# Patient Record
Sex: FEMALE | Race: WHITE | NOT HISPANIC OR LATINO | Employment: OTHER | URBAN - METROPOLITAN AREA
[De-identification: names, ages, dates, MRNs, and addresses within clinical notes are randomized per-mention and may not be internally consistent; named-entity substitution may affect disease eponyms.]

---

## 2017-01-17 ENCOUNTER — OFFICE VISIT (OUTPATIENT)
Dept: FAMILY MEDICINE CLINIC | Facility: CLINIC | Age: 57
End: 2017-01-17

## 2017-01-17 VITALS
HEART RATE: 98 BPM | DIASTOLIC BLOOD PRESSURE: 80 MMHG | BODY MASS INDEX: 22.66 KG/M2 | HEIGHT: 66 IN | SYSTOLIC BLOOD PRESSURE: 108 MMHG | WEIGHT: 141 LBS | OXYGEN SATURATION: 98 % | TEMPERATURE: 98.6 F

## 2017-01-17 DIAGNOSIS — G90.50 RSD (REFLEX SYMPATHETIC DYSTROPHY): Primary | ICD-10-CM

## 2017-01-17 PROCEDURE — 99213 OFFICE O/P EST LOW 20 MIN: CPT | Performed by: PHYSICIAN ASSISTANT

## 2017-01-17 RX ORDER — GABAPENTIN 800 MG/1
800 TABLET ORAL 3 TIMES DAILY
Qty: 90 TABLET | Refills: 11 | Status: SHIPPED | OUTPATIENT
Start: 2017-01-17 | End: 2018-01-05 | Stop reason: SDUPTHER

## 2017-01-17 RX ORDER — GABAPENTIN 800 MG/1
1 TABLET ORAL 3 TIMES DAILY
Refills: 1 | COMMUNITY
Start: 2016-12-26 | End: 2017-01-17 | Stop reason: SDUPTHER

## 2017-01-17 RX ORDER — VENLAFAXINE HYDROCHLORIDE 150 MG/1
1 CAPSULE, EXTENDED RELEASE ORAL DAILY
Refills: 0 | COMMUNITY
Start: 2016-12-19 | End: 2017-05-04 | Stop reason: ALTCHOICE

## 2017-01-17 RX ORDER — AMITRIPTYLINE HYDROCHLORIDE 10 MG/1
10 TABLET, FILM COATED ORAL 2 TIMES DAILY
Qty: 60 TABLET | Refills: 11 | Status: SHIPPED | OUTPATIENT
Start: 2017-01-17 | End: 2018-01-05 | Stop reason: SDUPTHER

## 2017-01-17 RX ORDER — BUPROPION HYDROCHLORIDE 150 MG/1
1 TABLET, EXTENDED RELEASE ORAL DAILY
Refills: 0 | COMMUNITY
Start: 2017-01-11 | End: 2017-01-24 | Stop reason: SINTOL

## 2017-01-17 NOTE — MR AVS SNAPSHOT
Lisbeth Perdue   1/17/2017 8:00 AM   Office Visit    Dept Phone:  [phone]   Encounter #:  [id number]    Provider:  THANG Rosenthal   Department:  [facility]                Your Full Care Plan              Today's Medication Changes          These changes are accurate as of: 1/17/17  8:24 AM.  If you have any questions, ask your nurse or doctor.               Medication(s)that have changed:     gabapentin 800 MG tablet   Commonly known as:  NEURONTIN   Take 1 tablet by mouth 3 (Three) Times a Day.   What changed:  Another medication with the same name was removed. Continue taking this medication, and follow the directions you see here.   Changed by:  THANG Rosenthal            Where to Get Your Medications      These medications were sent to [facility] [place] [address] - [address] [place] [address] - [phone]  - [phone] [address] [address]     Phone:  [phone]     amitriptyline 10 MG tablet    gabapentin 800 MG tablet                  Your Updated Medication List          This list is accurate as of: 1/17/17  8:24 AM.  Always use your most recent med list.                ALEVE 220 MG tablet   Generic drug:  naproxen sodium       amitriptyline 10 MG tablet   Commonly known as:  ELAVIL   Take 1 tablet by mouth 2 (Two) Times a Day.       ascorbic acid 1000 MG tablet   Commonly known as:  VITAMIN C       buPROPion  MG 12 hr tablet   Commonly known as:  WELLBUTRIN SR       clonazePAM 0.5 MG tablet   Commonly known as:  KlonoPIN       fish oil 1000 MG capsule capsule       gabapentin 800 MG tablet   Commonly known as:  NEURONTIN   Take 1 tablet by mouth 3 (Three) Times a Day.       levothyroxine 25 MCG tablet   Commonly known as:  SYNTHROID, LEVOTHROID       MINIVELLE 0.05 MG/24HR patch   Generic drug:  estradiol       multivitamin tablet tablet       PROBIOTIC DAILY PO       "venlafaxine  MG 24 hr capsule   Commonly known as:  EFFEXOR-XR       Vitamin D 2000 UNITS tablet               You Were Diagnosed With        Codes Comments    RSD (reflex sympathetic dystrophy)    -  Primary ICD-10-CM: G90.50  ICD-9-CM: 337.20       Instructions     None    Patient Instructions History      Upcoming Appointments     Visit Type Date Time Department    OFFICE VISIT 1/17/2017  8:00 AM [facility]    PHYSICAL 1/24/2017  9:00 AM [facility]      MyChart Signup     Our records indicate that your [facility] account has been deactivated. If you would like to reactivate your account, please email [email or web address] or call [phone] to talk to our [employer] staff.             Other Info from Your Visit           Your Appointments     Jan 24, 2017  9:00 AM EST   Physical with Uday Norwood MD   [facility] (--)    [address] [place] [id number] [phone]           Arrive 15 minutes prior to appointment.              Allergies     No Known Allergies      Reason for Visit     Oral Pain refills on klonopin and amitriptyline    Pain refills on gabapentin      Vital Signs     Blood Pressure Pulse Temperature Height Weight Oxygen Saturation    108/80 98 98.6 °F (37 °C) 66\" (167.6 cm) 141 lb (64 kg) 98%    Body Mass Index Smoking Status                22.76 kg/m2 Never Smoker          Problems and Diagnoses Noted     RSD (reflex sympathetic dystrophy)    -  Primary        "

## 2017-01-17 NOTE — PROGRESS NOTES
Subjective   Lisbeth Perdue is a 56 y.o. female    History of Present Illness  Patient 56-year-old white female comes in for follow-up of RSD facial pain, she takes gabapentin, Klonopin and amitriptyline for pain she states her pain is controlled doing well no problems or complaints.  Has pain when opening closing mouth.  But is improved and controlled with medication.      The following portions of the patient's history were reviewed and updated as appropriate: allergies, current medications, past social history and problem list    Review of Systems   Constitutional: Negative for fatigue and unexpected weight change.   Respiratory: Negative for cough, chest tightness and shortness of breath.    Cardiovascular: Negative for chest pain, palpitations and leg swelling.   Gastrointestinal: Negative for nausea.   Skin: Negative for color change and rash.   Neurological: Negative for dizziness, syncope, weakness and headaches.       Objective     Vitals:    01/17/17 0804   BP: 108/80   Pulse: 98   Temp: 98.6 °F (37 °C)   SpO2: 98%       Physical Exam   Constitutional: She appears well-developed and well-nourished.   Neck: No JVD present.   Cardiovascular: Normal rate, regular rhythm, normal heart sounds, intact distal pulses and normal pulses.    No murmur heard.  Pulmonary/Chest: Effort normal and breath sounds normal. No respiratory distress.   Abdominal: Soft. Bowel sounds are normal. There is no hepatosplenomegaly. There is no tenderness.   Musculoskeletal: She exhibits no edema.   Skin: Skin is warm and dry.   Nursing note and vitals reviewed.      Assessment/Plan     Diagnoses and all orders for this visit:    RSD (reflex sympathetic dystrophy)  -     amitriptyline (ELAVIL) 10 MG tablet; Take 1 tablet by mouth 2 (Two) Times a Day.  -     gabapentin (NEURONTIN) 800 MG tablet; Take 1 tablet by mouth 3 (Three) Times a Day.    Other orders  -     venlafaxine XR (EFFEXOR-XR) 150 MG 24 hr capsule; Take 1 capsule by  mouth Daily.  -     buPROPion SR (WELLBUTRIN SR) 150 MG 12 hr tablet; Take 1 tablet by mouth Daily.  -     Discontinue: gabapentin (NEURONTIN) 800 MG tablet; Take 1 tablet by mouth 3 (Three) Times a Day.     #1 amitriptyline 10 mg 1 tablet twice day dispensed 60 with 11 refills    Gabapentin 800 mg 3 times a day dispense 90×11 refills    Klonopin 0.5 mg 1 by mouth everyday dispense 30 with 5 refills

## 2017-01-24 ENCOUNTER — OFFICE VISIT (OUTPATIENT)
Dept: FAMILY MEDICINE CLINIC | Facility: CLINIC | Age: 57
End: 2017-01-24

## 2017-01-24 VITALS
DIASTOLIC BLOOD PRESSURE: 60 MMHG | WEIGHT: 142 LBS | HEART RATE: 80 BPM | HEIGHT: 66 IN | BODY MASS INDEX: 22.82 KG/M2 | SYSTOLIC BLOOD PRESSURE: 108 MMHG | TEMPERATURE: 98.4 F

## 2017-01-24 DIAGNOSIS — N95.1 MENOPAUSAL STATE: ICD-10-CM

## 2017-01-24 DIAGNOSIS — R94.31 NONSPECIFIC ABNORMAL ELECTROCARDIOGRAM (ECG) (EKG): ICD-10-CM

## 2017-01-24 DIAGNOSIS — G90.521 COMPLEX REGIONAL PAIN SYNDROME TYPE 1 OF RIGHT LOWER EXTREMITY: ICD-10-CM

## 2017-01-24 DIAGNOSIS — E03.9 ACQUIRED HYPOTHYROIDISM: ICD-10-CM

## 2017-01-24 DIAGNOSIS — Z00.00 ENCOUNTER FOR ANNUAL PHYSICAL EXAM: Primary | ICD-10-CM

## 2017-01-24 LAB
BILIRUB BLD-MCNC: NEGATIVE MG/DL
CLARITY, POC: CLEAR
COLOR UR: YELLOW
GLUCOSE UR STRIP-MCNC: NEGATIVE MG/DL
KETONES UR QL: NEGATIVE
LEUKOCYTE EST, POC: NEGATIVE
NITRITE UR-MCNC: NEGATIVE MG/ML
PH UR: 7 [PH] (ref 5–8)
PROT UR STRIP-MCNC: NEGATIVE MG/DL
RBC # UR STRIP: NEGATIVE /UL
SP GR UR: 1.01 (ref 1–1.03)
UROBILINOGEN UR QL: NORMAL

## 2017-01-24 PROCEDURE — 99396 PREV VISIT EST AGE 40-64: CPT | Performed by: FAMILY MEDICINE

## 2017-01-24 PROCEDURE — 81003 URINALYSIS AUTO W/O SCOPE: CPT | Performed by: FAMILY MEDICINE

## 2017-01-24 NOTE — MR AVS SNAPSHOT
Lisbeth Miller   1/24/2017 9:00 AM   Office Visit    Dept Phone:  [phone]   Encounter #:  [id number]    Provider:  Uday Norwood MD   Department:  [facility]                Your Full Care Plan              Today's Medication Changes          These changes are accurate as of: 1/24/17  9:49 AM.  If you have any questions, ask your nurse or doctor.               Stop taking medication(s)listed here:     buPROPion  MG 12 hr tablet   Commonly known as:  WELLBUTRIN SR   Stopped by:  Uday Norwood MD                      Your Updated Medication List          This list is accurate as of: 1/24/17  9:49 AM.  Always use your most recent med list.                ALEVE 220 MG tablet   Generic drug:  naproxen sodium       amitriptyline 10 MG tablet   Commonly known as:  ELAVIL   Take 1 tablet by mouth 2 (Two) Times a Day.       ascorbic acid 1000 MG tablet   Commonly known as:  VITAMIN C       clonazePAM 0.5 MG tablet   Commonly known as:  KlonoPIN       fish oil 1000 MG capsule capsule       gabapentin 800 MG tablet   Commonly known as:  NEURONTIN   Take 1 tablet by mouth 3 (Three) Times a Day.       levothyroxine 25 MCG tablet   Commonly known as:  SYNTHROID, LEVOTHROID       MINIVELLE 0.05 MG/24HR patch   Generic drug:  estradiol       multivitamin tablet tablet       PROBIOTIC DAILY PO       venlafaxine  MG 24 hr capsule   Commonly known as:  EFFEXOR-XR       Vitamin D 2000 UNITS tablet               We Performed the Following     POC Urinalysis Dipstick, Automated       You Were Diagnosed With        Codes Comments    Encounter for annual physical exam    -  Primary ICD-10-CM: Z00.00  ICD-9-CM: V70.0       Instructions     None    Patient Instructions History      Upcoming Appointments     Visit Type Date Time Department    PHYSICAL 1/24/2017  9:00 AM [facility]    FOLLOW UP 7/11/2017  8:00 AM [facility]       "  MyChart Signup     [facility] allows you to send messages to your doctor, view your test results, renew your prescriptions, schedule appointments, and more. To sign up, go to [employer] and click on the Sign Up Now link in the New User? box. Enter your [employer] Activation Code exactly as it appears below along with the last four digits of your Social Security Number and your Date of Birth () to complete the sign-up process. If you do not sign up before the expiration date, you must request a new code.    [employer] Activation Code: [email or web address]  Expires: 2017  9:49 AM    If you have questions, you can email [email or web address] or call [phone] to talk to our [employer] staff. Remember, [employer] is NOT to be used for urgent needs. For medical emergencies, dial 911.               Other Info from Your Visit           Your Appointments     2017  8:00 AM EDT   Follow Up with THANG Rosenthal   [facility] (--)    [address] [place] [id number] [phone]           Arrive 15 minutes prior to appointment.              Allergies     No Known Allergies      Reason for Visit     Annual Exam Pt is not fasting this morning.  Would like dexa scan,  Hep C questions    Thyroid Problem Pt has thyroiditis, off synthroid now, sees Dr. Hannon she would like this checked just to make sure      Vital Signs     Blood Pressure Pulse Temperature Height Weight Body Mass Index    108/60 80 98.4 °F (36.9 °C) 66\" (167.6 cm) 142 lb (64.4 kg) 22.92 kg/m2    Smoking Status                   Never Smoker           Problems and Diagnoses Noted     Encounter for annual physical exam    -  Primary      Results     POC Urinalysis Dipstick, Automated      Component Value Standard Range & Units    Color Yellow Yellow, Straw, Dark Yellow, Arlette    Clarity, UA Clear Clear    Glucose, UA Negative Negative, 1000 mg/dL (3+) mg/dL    " Bilirubin Negative Negative    Ketones, UA Negative Negative    Specific Gravity  1.010 1.005 - 1.030    Blood, UA Negative Negative    pH, Urine 7.0 5.0 - 8.0    Protein, POC Negative Negative mg/dL    Urobilinogen, UA Normal Normal    Leukocytes Negative Negative    Nitrite, UA Negative Negative

## 2017-01-25 ENCOUNTER — APPOINTMENT (OUTPATIENT)
Dept: LAB | Facility: HOSPITAL | Age: 57
End: 2017-01-25

## 2017-01-25 LAB
25(OH)D3 SERPL-MCNC: 37.6 NG/ML
ALBUMIN SERPL-MCNC: 4.2 G/DL (ref 3.2–4.8)
ALBUMIN/GLOB SERPL: 1.7 G/DL (ref 1.5–2.5)
ALP SERPL-CCNC: 37 U/L (ref 25–100)
ALT SERPL W P-5'-P-CCNC: 23 U/L (ref 7–40)
ANION GAP SERPL CALCULATED.3IONS-SCNC: 3 MMOL/L (ref 3–11)
ARTICHOKE IGE QN: 104 MG/DL (ref 0–130)
AST SERPL-CCNC: 28 U/L (ref 0–33)
BILIRUB SERPL-MCNC: 0.5 MG/DL (ref 0.3–1.2)
BUN BLD-MCNC: 11 MG/DL (ref 9–23)
BUN/CREAT SERPL: 12.2 (ref 7–25)
CALCIUM SPEC-SCNC: 9.9 MG/DL (ref 8.7–10.4)
CHLORIDE SERPL-SCNC: 105 MMOL/L (ref 99–109)
CHOLEST SERPL-MCNC: 254 MG/DL (ref 0–200)
CO2 SERPL-SCNC: 33 MMOL/L (ref 20–31)
CREAT BLD-MCNC: 0.9 MG/DL (ref 0.6–1.3)
DEPRECATED RDW RBC AUTO: 43.2 FL (ref 37–54)
ERYTHROCYTE [DISTWIDTH] IN BLOOD BY AUTOMATED COUNT: 13 % (ref 11.3–14.5)
GFR SERPL CREATININE-BSD FRML MDRD: 65 ML/MIN/1.73
GLOBULIN UR ELPH-MCNC: 2.5 GM/DL
GLUCOSE BLD-MCNC: 84 MG/DL (ref 70–100)
HCT VFR BLD AUTO: 40.4 % (ref 34.5–44)
HCV AB SER DONR QL: NORMAL
HDLC SERPL-MCNC: 90 MG/DL (ref 40–60)
HGB BLD-MCNC: 13.3 G/DL (ref 11.5–15.5)
MCH RBC QN AUTO: 29.8 PG (ref 27–31)
MCHC RBC AUTO-ENTMCNC: 32.9 G/DL (ref 32–36)
MCV RBC AUTO: 90.6 FL (ref 80–99)
PLATELET # BLD AUTO: 257 10*3/MM3 (ref 150–450)
PMV BLD AUTO: 11.6 FL (ref 6–12)
POTASSIUM BLD-SCNC: 4.7 MMOL/L (ref 3.5–5.5)
PROT SERPL-MCNC: 6.7 G/DL (ref 5.7–8.2)
RBC # BLD AUTO: 4.46 10*6/MM3 (ref 3.89–5.14)
SODIUM BLD-SCNC: 141 MMOL/L (ref 132–146)
T4 SERPL-MCNC: 5.3 MCG/DL (ref 4.7–11.4)
TRIGL SERPL-MCNC: 100 MG/DL (ref 0–150)
TSH SERPL DL<=0.05 MIU/L-ACNC: 2.77 MIU/ML (ref 0.35–5.35)
WBC NRBC COR # BLD: 4.1 10*3/MM3 (ref 3.5–10.8)

## 2017-01-25 PROCEDURE — 84436 ASSAY OF TOTAL THYROXINE: CPT | Performed by: FAMILY MEDICINE

## 2017-01-25 PROCEDURE — 86803 HEPATITIS C AB TEST: CPT | Performed by: FAMILY MEDICINE

## 2017-01-25 PROCEDURE — 84443 ASSAY THYROID STIM HORMONE: CPT | Performed by: FAMILY MEDICINE

## 2017-01-25 PROCEDURE — 36415 COLL VENOUS BLD VENIPUNCTURE: CPT | Performed by: FAMILY MEDICINE

## 2017-01-25 PROCEDURE — 82306 VITAMIN D 25 HYDROXY: CPT | Performed by: FAMILY MEDICINE

## 2017-01-25 PROCEDURE — 80053 COMPREHEN METABOLIC PANEL: CPT | Performed by: FAMILY MEDICINE

## 2017-01-25 PROCEDURE — 80061 LIPID PANEL: CPT | Performed by: FAMILY MEDICINE

## 2017-01-25 PROCEDURE — 85027 COMPLETE CBC AUTOMATED: CPT | Performed by: FAMILY MEDICINE

## 2017-01-25 NOTE — PROGRESS NOTES
Subjective   Lisbeth Perdue is a 56 y.o. female    HPI Comments: Patient here for annual physical. No acute medical complaints. Concerned about thyroid, menopause, HRT, CRPS and medications. CRPS symptoms controlled with current meds per Dr Matias. Has memory issues likely due to gabapentin and loss of libido from Cymbalta and other medications. Has had hysterectomy and is on HRT.    Thyroid Problem         The following portions of the patient's history were reviewed and updated as appropriate: allergies, current medications, past social history and problem list    Review of Systems   Constitutional: Negative.    HENT: Negative.    Eyes: Negative.    Respiratory: Negative.    Cardiovascular: Negative.    Gastrointestinal: Negative.    Endocrine: Negative.    Genitourinary: Negative.    Musculoskeletal: Negative.    Skin: Negative.    Allergic/Immunologic: Negative.    Neurological: Negative.    Hematological: Negative.    Psychiatric/Behavioral: Negative.    All other systems reviewed and are negative.      Objective     Vitals:    01/24/17 0904   BP: 108/60   Pulse: 80   Temp: 98.4 °F (36.9 °C)       Physical Exam   Constitutional: She is oriented to person, place, and time. She appears well-developed and well-nourished.   HENT:   Head: Normocephalic and atraumatic.   Right Ear: External ear normal.   Left Ear: External ear normal.   Nose: Nose normal.   Mouth/Throat: Oropharynx is clear and moist.   Eyes: Conjunctivae and EOM are normal. Pupils are equal, round, and reactive to light.   Neck: Normal range of motion. Neck supple. No thyromegaly present.   Cardiovascular: Normal rate, regular rhythm, normal heart sounds and intact distal pulses.    No murmur heard.  Pulmonary/Chest: Effort normal and breath sounds normal.   Abdominal: Soft. Bowel sounds are normal. She exhibits no mass. There is no tenderness.   Musculoskeletal: Normal range of motion. She exhibits no edema.   Lymphadenopathy:     She has no  cervical adenopathy.   Neurological: She is alert and oriented to person, place, and time. She has normal reflexes. No cranial nerve deficit.   Skin: Skin is warm and dry.   Psychiatric: She has a normal mood and affect. Her behavior is normal.   Nursing note and vitals reviewed.    ECG 12 Lead  Date/Time: 1/27/2017 7:45 AM  Performed by: FRANCESCO DOWNS  Authorized by: FRANCESCO DOWNS   Comparison: not compared with previous ECG   Rhythm: sinus rhythm  Rate: normal  Conduction: conduction normal  ST Depression: all  T Waves: T waves normal  QRS axis: normal  Other: no other findings  Clinical impression: non-specific ECG  Comments: Diffuse, minimal ST junctional depression            Assessment/Plan   Problem List Items Addressed This Visit        Nervous and Auditory    CRPS (complex regional pain syndrome)    Relevant Orders    CBC (No Diff) (Completed)    Comprehensive Metabolic Panel (Completed)    TSH (Completed)    T4 (Completed)    Vitamin D 25 Hydroxy (Completed)      Other Visit Diagnoses     Encounter for annual physical exam    -  Primary    Relevant Orders    POC Urinalysis Dipstick, Automated (Completed)    CBC (No Diff) (Completed)    Comprehensive Metabolic Panel (Completed)    Hepatitis C Antibody (Completed)    Lipid Panel (Completed)    TSH (Completed)    T4 (Completed)    Vitamin D 25 Hydroxy (Completed)    ECG 12 Lead    Menopausal state        Relevant Orders    Vitamin D 25 Hydroxy (Completed)    DEXA Bone Density Axial    Nonspecific abnormal electrocardiogram (ECG) (EKG)        Relevant Orders    ECG 12 Lead    Acquired hypothyroidism            Patient is counseled during today's visit regarding preventative health measures to include use of seatbelts, medication safety, healthy diet and regular exercise.

## 2017-01-27 PROCEDURE — 93000 ELECTROCARDIOGRAM COMPLETE: CPT | Performed by: FAMILY MEDICINE

## 2017-02-23 ENCOUNTER — TELEPHONE (OUTPATIENT)
Dept: FAMILY MEDICINE CLINIC | Facility: CLINIC | Age: 57
End: 2017-02-23

## 2017-02-23 DIAGNOSIS — N95.1 MENOPAUSAL AND FEMALE CLIMACTERIC STATES: Primary | ICD-10-CM

## 2017-02-23 NOTE — TELEPHONE ENCOUNTER
Patient was seen on 01/24 and states that you wanted her to have a bone density scan. Patient has not heard anything about appointment.

## 2017-03-15 ENCOUNTER — HOSPITAL ENCOUNTER (OUTPATIENT)
Dept: BONE DENSITY | Facility: HOSPITAL | Age: 57
Discharge: HOME | End: 2017-03-15
Admitting: FAMILY MEDICINE

## 2017-03-15 PROCEDURE — 77080 DXA BONE DENSITY AXIAL: CPT | Performed by: RADIOLOGY

## 2017-03-15 PROCEDURE — 77080 DXA BONE DENSITY AXIAL: CPT

## 2017-05-04 ENCOUNTER — OFFICE VISIT (OUTPATIENT)
Dept: GASTROENTEROLOGY | Facility: CLINIC | Age: 57
End: 2017-05-04

## 2017-05-04 VITALS
WEIGHT: 149.2 LBS | SYSTOLIC BLOOD PRESSURE: 110 MMHG | HEART RATE: 86 BPM | OXYGEN SATURATION: 98 % | TEMPERATURE: 98.9 F | DIASTOLIC BLOOD PRESSURE: 78 MMHG | BODY MASS INDEX: 23.98 KG/M2 | HEIGHT: 66 IN

## 2017-05-04 DIAGNOSIS — K59.09 CHRONIC CONSTIPATION: Primary | ICD-10-CM

## 2017-05-04 PROCEDURE — 99213 OFFICE O/P EST LOW 20 MIN: CPT | Performed by: NURSE PRACTITIONER

## 2017-05-04 RX ORDER — DULOXETIN HYDROCHLORIDE 60 MG/1
60 CAPSULE, DELAYED RELEASE ORAL DAILY
Refills: 0 | COMMUNITY
Start: 2017-04-16 | End: 2017-09-17 | Stop reason: SDUPTHER

## 2017-05-15 ENCOUNTER — OFFICE VISIT (OUTPATIENT)
Dept: FAMILY MEDICINE CLINIC | Facility: CLINIC | Age: 57
End: 2017-05-15

## 2017-05-15 VITALS
SYSTOLIC BLOOD PRESSURE: 108 MMHG | OXYGEN SATURATION: 98 % | DIASTOLIC BLOOD PRESSURE: 74 MMHG | BODY MASS INDEX: 23.63 KG/M2 | RESPIRATION RATE: 14 BRPM | HEART RATE: 70 BPM | WEIGHT: 147 LBS | HEIGHT: 66 IN

## 2017-05-15 DIAGNOSIS — S30.1XXA HIP POINTER, INITIAL ENCOUNTER: Primary | ICD-10-CM

## 2017-05-15 PROCEDURE — 99213 OFFICE O/P EST LOW 20 MIN: CPT | Performed by: PHYSICIAN ASSISTANT

## 2017-05-15 RX ORDER — NABUMETONE 500 MG/1
500 TABLET, FILM COATED ORAL 2 TIMES DAILY PRN
Qty: 28 TABLET | Refills: 0 | Status: SHIPPED | OUTPATIENT
Start: 2017-05-15 | End: 2017-07-11

## 2017-07-11 ENCOUNTER — OFFICE VISIT (OUTPATIENT)
Dept: FAMILY MEDICINE CLINIC | Facility: CLINIC | Age: 57
End: 2017-07-11

## 2017-07-11 VITALS
TEMPERATURE: 98.4 F | WEIGHT: 142 LBS | OXYGEN SATURATION: 96 % | HEIGHT: 66 IN | DIASTOLIC BLOOD PRESSURE: 74 MMHG | SYSTOLIC BLOOD PRESSURE: 108 MMHG | HEART RATE: 91 BPM | RESPIRATION RATE: 14 BRPM | BODY MASS INDEX: 22.82 KG/M2

## 2017-07-11 DIAGNOSIS — F41.9 ANXIETY: ICD-10-CM

## 2017-07-11 DIAGNOSIS — M79.672 PAIN IN BOTH FEET: Primary | ICD-10-CM

## 2017-07-11 DIAGNOSIS — K58.1 IRRITABLE BOWEL SYNDROME WITH CONSTIPATION: ICD-10-CM

## 2017-07-11 DIAGNOSIS — M79.671 PAIN IN BOTH FEET: Primary | ICD-10-CM

## 2017-07-11 PROCEDURE — 99214 OFFICE O/P EST MOD 30 MIN: CPT | Performed by: PHYSICIAN ASSISTANT

## 2017-07-12 NOTE — PROGRESS NOTES
Subjective   Lisbeth Perdue is a 56 y.o. female    History of Present Illness  To the pleasant 56-year-old white female comes in complaining of bilateral foot pain for last several months.  Patient has pain when standing, walking for long distances.  Patient states pain is worse when she gets out of bed in the morning she has pain in her heel midfoot pain patient describes pain as sharp stabbing radiating to calf.  Denies any numbness or tingling in for toes.  Patient's pain is 7 out of 10 describes pain as burning type pain.  No over-the-counter medication has been helpful for treatment.    Patient comes in follow-up of anxiety states meds working well takes Klonopin, meds working well anxiety is controlled with medication no SI/HI depression is helping well    Comes in complaining of of IBS chronic constipation takes lens as Linzess works well symptoms are controlled with medication no side effects of medication no diarrhea no blood in stool states symptoms are controlled and are stable.      The following portions of the patient's history were reviewed and updated as appropriate: allergies, current medications, past social history and problem list    Review of Systems   Constitutional: Negative for appetite change, diaphoresis, fatigue and unexpected weight change.   Eyes: Negative for visual disturbance.   Respiratory: Negative for cough, chest tightness and shortness of breath.    Cardiovascular: Negative for chest pain, palpitations and leg swelling.   Gastrointestinal: Positive for constipation. Negative for diarrhea, nausea and vomiting.   Endocrine: Negative for polydipsia, polyphagia and polyuria.   Musculoskeletal:        Bilateral foot pain   Skin: Negative for color change and rash.   Neurological: Negative for dizziness, syncope, weakness, light-headedness, numbness and headaches.       Objective     Vitals:    07/11/17 0751   BP: 108/74   Pulse: 91   Resp: 14   Temp: 98.4 °F (36.9 °C)   SpO2: 96%        Physical Exam   Constitutional: She appears well-developed and well-nourished.   Neck: No JVD present.   Cardiovascular: Normal rate, regular rhythm, normal heart sounds, intact distal pulses and normal pulses.    No murmur heard.  Pulmonary/Chest: Effort normal and breath sounds normal. No respiratory distress.   Abdominal: Soft. Bowel sounds are normal. There is no hepatosplenomegaly. There is no tenderness.   Musculoskeletal: She exhibits no edema.        Skin: Skin is warm and dry.   Nursing note and vitals reviewed.      Assessment/Plan     Diagnoses and all orders for this visit:    Pain in both feet    Anxiety    Irritable bowel syndrome with constipation  -     linaclotide (LINZESS) 145 MCG capsule capsule; Take 1 capsule by mouth Daily for 90 days.    #1 plantar fasciitis range of motion exercises given, over-the-counter ibuprofen 400 mg 3 times a day    #2 Klonopin 0.5 mg everyday dispense 30×5 refills    #3 refill Linzess 145 mg 1 by mouth everyday dispense 90×3 refills

## 2017-07-21 ENCOUNTER — OFFICE VISIT (OUTPATIENT)
Dept: FAMILY MEDICINE CLINIC | Facility: CLINIC | Age: 57
End: 2017-07-21

## 2017-07-21 VITALS
OXYGEN SATURATION: 98 % | HEART RATE: 101 BPM | DIASTOLIC BLOOD PRESSURE: 68 MMHG | TEMPERATURE: 97.8 F | BODY MASS INDEX: 22.66 KG/M2 | RESPIRATION RATE: 14 BRPM | WEIGHT: 141 LBS | SYSTOLIC BLOOD PRESSURE: 104 MMHG | HEIGHT: 66 IN

## 2017-07-21 DIAGNOSIS — Z51.81 MEDICATION MONITORING ENCOUNTER: ICD-10-CM

## 2017-07-21 DIAGNOSIS — B35.1 TOENAIL FUNGUS: Primary | ICD-10-CM

## 2017-07-21 PROCEDURE — 99213 OFFICE O/P EST LOW 20 MIN: CPT | Performed by: PHYSICIAN ASSISTANT

## 2017-07-21 RX ORDER — TERBINAFINE HYDROCHLORIDE 250 MG/1
250 TABLET ORAL DAILY
Qty: 30 TABLET | Refills: 2 | Status: SHIPPED | OUTPATIENT
Start: 2017-07-21 | End: 2018-01-05

## 2017-07-21 NOTE — PROGRESS NOTES
Subjective   Lisbeth Perdue is a 56 y.o. female    History of Present Illness  Patient 56-year-old white female comes in plan thickened yellow toenails on both right and left feet, noticed toenails thickening about 6 months ago and over the last couple months and getting worse nails or painful inflamed, no blisters seen around the nail bed.  The following portions of the patient's history were reviewed and updated as appropriate: allergies, current medications, past social history and problem list    Review of Systems    Objective     Vitals:    07/21/17 1006   BP: 104/68   Pulse: 101   Resp: 14   Temp: 97.8 °F (36.6 °C)   SpO2: 98%       Physical Exam   Constitutional: She appears well-developed and well-nourished.   Neck: Neck supple. No JVD present. No thyromegaly present.   Cardiovascular: Normal rate, regular rhythm, normal heart sounds, intact distal pulses and normal pulses.    No murmur heard.  Pulmonary/Chest: Effort normal and breath sounds normal. No respiratory distress.   Abdominal: Soft. Bowel sounds are normal. There is no hepatosplenomegaly. There is no tenderness.   Musculoskeletal: She exhibits no edema.        Lymphadenopathy:     She has no cervical adenopathy.   Neurological: No sensory deficit.   Skin: Skin is warm and dry. She is not diaphoretic.   Nursing note and vitals reviewed.      Assessment/Plan     Diagnoses and all orders for this visit:    Toenail fungus  -     terbinafine (LAMISIL) 250 MG tablet; Take 1 tablet by mouth Daily.  -     Hepatic Function Panel; Future    Medication monitoring encounter  -     Hepatic Function Panel; Future    #1 start Lamisil 250 mg 1 by mouth everyday dispense 30×3 months    Check liver enzymes in 6-8 weeks

## 2017-08-07 ENCOUNTER — TELEPHONE (OUTPATIENT)
Dept: FAMILY MEDICINE CLINIC | Facility: CLINIC | Age: 57
End: 2017-08-07

## 2017-08-07 DIAGNOSIS — M79.671 RIGHT FOOT PAIN: Primary | ICD-10-CM

## 2017-08-07 NOTE — TELEPHONE ENCOUNTER
----- Message from Kristine Panda sent at 8/7/2017 11:55 AM EDT -----  Patient would like physical therapy for her plantar fascitis for her right foot. She wants to see Tiffanie on [facility]. Just need an order..  Thanks,  Kristine..

## 2017-09-08 ENCOUNTER — CLINICAL SUPPORT (OUTPATIENT)
Dept: FAMILY MEDICINE CLINIC | Facility: CLINIC | Age: 57
End: 2017-09-08

## 2017-09-08 DIAGNOSIS — Z23 IMMUNIZATION DUE: Primary | ICD-10-CM

## 2017-09-18 RX ORDER — DULOXETIN HYDROCHLORIDE 60 MG/1
CAPSULE, DELAYED RELEASE ORAL
Qty: 30 CAPSULE | Refills: 5 | Status: SHIPPED | OUTPATIENT
Start: 2017-09-18 | End: 2018-01-05 | Stop reason: SDUPTHER

## 2017-10-20 ENCOUNTER — OFFICE VISIT (OUTPATIENT)
Dept: ORTHOPEDIC SURGERY | Facility: CLINIC | Age: 57
End: 2017-10-20

## 2017-10-20 VITALS
SYSTOLIC BLOOD PRESSURE: 118 MMHG | DIASTOLIC BLOOD PRESSURE: 60 MMHG | HEART RATE: 82 BPM | BODY MASS INDEX: 21.21 KG/M2 | WEIGHT: 132 LBS | HEIGHT: 66 IN

## 2017-10-20 DIAGNOSIS — M72.2 PLANTAR FASCIITIS OF RIGHT FOOT: Primary | ICD-10-CM

## 2017-10-20 PROCEDURE — 99213 OFFICE O/P EST LOW 20 MIN: CPT | Performed by: PHYSICIAN ASSISTANT

## 2017-10-20 RX ORDER — LINACLOTIDE 145 UG/1
CAPSULE, GELATIN COATED ORAL
Refills: 0 | COMMUNITY
Start: 2017-10-16 | End: 2018-01-05 | Stop reason: SDUPTHER

## 2017-10-20 RX ORDER — INFLUENZA VIRUS VACCINE 15; 15; 15; 15 UG/.5ML; UG/.5ML; UG/.5ML; UG/.5ML
SUSPENSION INTRAMUSCULAR
Refills: 0 | COMMUNITY
Start: 2017-10-17 | End: 2020-06-26

## 2017-10-20 NOTE — PROGRESS NOTES
Patient: Lisbeth Perdue  : 1960    Primary Care Provider: Uday Norwood MD    Requesting Provider: As above    Pain of the Right Foot (Ortho vitals taken 10/20/2017 /)      History    Chief Complaint: Right heel pain    History of Present Illness: Patient returns today discuss a new problem.  She reports she has had right heel pain for the past several months.  She denies any specific trauma or injury.  She reports the pain began when she was [place] a [place].  In general, she does a lot of walking 5Ks without any difficult.  She describes the pain as sharp, worse barefoot.  She denies any radiating pain or mechanical symptoms.  No warmth swelling or erythema. No rest pain or night pain.  She has tried treating in the past with intermittent stretching and anti-inflammatories.  She then went to formal physical therapy without any improved pain.  She is here for more definitive treatment plan to resolve her pain.        Allergies   Allergen Reactions   • No Known Drug Allergy      Current Outpatient Prescriptions on File Prior to Visit   Medication Sig Dispense Refill   • amitriptyline (ELAVIL) 10 MG tablet Take 1 tablet by mouth 2 (Two) Times a Day. 60 tablet 11   • ascorbic acid (VITAMIN C) 1000 MG tablet Take  by mouth daily.     • Cholecalciferol (VITAMIN D) 2000 UNITS tablet Take  by mouth.     • clonazePAM (KlonoPIN) 0.5 MG tablet Take 0.5 mg by mouth Daily.     • DULoxetine (CYMBALTA) 60 MG capsule take 1 capsule by mouth once daily 30 capsule 5   • estradiol (MINIVELLE) 0.05 MG/24HR patch Place  on the skin.     • gabapentin (NEURONTIN) 800 MG tablet Take 1 tablet by mouth 3 (Three) Times a Day. 90 tablet 11   • naproxen sodium (ALEVE) 220 MG tablet Take 220 mg by mouth As Needed.     • Omega-3 Fatty Acids (FISH OIL) 1000 MG capsule capsule Take  by mouth daily.     • Probiotic Product (PROBIOTIC DAILY PO) Take  by mouth.     • terbinafine (LAMISIL) 250 MG tablet Take  1 tablet by mouth Daily. 30 tablet 2   • levothyroxine (SYNTHROID, LEVOTHROID) 50 MCG tablet Take 50 mcg by mouth Daily.     • multivitamin (THERAGRAN) tablet tablet Take  by mouth daily.       No current facility-administered medications on file prior to visit.      Social History     Social History   • Marital status:      Spouse name: N/A   • Number of children: N/A   • Years of education: N/A     Occupational History   • Not on file.     Social History Main Topics   • Smoking status: Never Smoker   • Smokeless tobacco: Never Used   • Alcohol use No   • Drug use: No   • Sexual activity: Defer     Other Topics Concern   • Not on file     Social History Narrative     Past Surgical History:   Procedure Laterality Date   • BUNIONECTOMY     • COLONOSCOPY  10/2016    DR GORE   • FOOT SURGERY Right    • FRACTURE SURGERY     • HYSTERECTOMY  2003   • TONSILLECTOMY       Family History   Problem Relation Age of Onset   • Hypertension Mother    • Hypothyroidism Mother    • Constipation Mother    • Depression Mother    • Osteoarthritis Mother    • Cancer Mother    • Lymphoma Father    • Glaucoma Father    • Colon polyps Father    • Depression Father    • Cancer Father    • Other Sister    • Thyroid disease Other      thyroid problems   • Depression Other    • Heart disease Other    • Hypertension Other    • Cancer Other    • Osteoarthritis Other    • Heart attack Other    • Depression Other    • Depression Child      Past Medical History:   Diagnosis Date   • Anemia    • Burning mouth syndrome    • CRPS (complex regional pain syndrome)     lower limb   • Dysuria    • Edema    • Hypothyroidism    • IBS (irritable bowel syndrome)    • Nevus, atypical    • Osteoarthrosis involving lower leg    • Pain in joint, lower leg    • RSD lower limb    • Toe contusion    • Toe pain          Review of Systems   Constitutional: Positive for activity change.   HENT: Negative.    Eyes: Negative.    Respiratory: Negative.   "  Cardiovascular: Negative.    Gastrointestinal: Positive for constipation.   Endocrine: Negative.    Genitourinary: Negative.    Musculoskeletal: Positive for arthralgias and back pain.   Skin: Negative.    Allergic/Immunologic: Negative.    Neurological: Negative.    Hematological: Bruises/bleeds easily.   Psychiatric/Behavioral: Negative.        The following portions of the patient's history were reviewed and updated as appropriate: allergies, current medications, past family history, past medical history, past social history, past surgical history and problem list.    Objective   /60  Pulse 82  Ht 66\" (167.6 cm)  Wt 132 lb (59.9 kg)  BMI 21.31 kg/m2    Physical Exam:   GENERAL: Body habitus: normal weight for height    Lower extremity edema: Left: none; Right: none    Varicose veins:  Left: none; Right: none    Gait: normal     Mental Status:  awake and alert; oriented to person, place, and time    Voice:  clear  SKIN:  Normal    Hair Growth:  Right:normal; Left:  normal  HEENT: Head: Normocephalic, no lesions, without obvious abnormality.     Eyes: sclera anicteric  PULM:  Repiratory effort normal    Ortho Exam  V:  Dorsalis Pedis:  Right: 2+; Left:2+    Posterior Tibial: Right:2+; Left:2+    Capillary Refill:  Brisk  MSK:  Hand:right handed      Tibia:  Right:  non tender; Left:  non tender      Ankle:  Right: non tender; Left:  non tender      Foot:  Right:  tender over the origin of the plantar fascia; Left:  non tender      NEURO: Heel Walking:  Right:  normal; Left:  normal    Toe Walking:  Right:  normal; Left:  normal     [place] 5.07 monofilament test: normal    Lower extremity sensation: intact     Calf Atrophy:none    Motor Function: all 5/5          Medical Decision Making    Data Review:   none    Assessment and Plan/ Diagnosis/Treatment options:   Right plantar fasciitis:  I explained plantar fasciitis in detail to the patient.  I explained to the bow-string mechanism of the " "plantar fascia.  I went over the current research of the [employer] with them.  I explained the treatments that were not statistically significant in improving the problem including: injection of steroids, special orthotics, special shoes, surgery, medication, ice, not working, etc.    I explained the treatments that are statistically significant at improving the problem.  These include stretching/night splinting, casting, PRP, OssaTron.    I explained the most important treatment: Stretching and night splinting.  I went over the patient information sheet with them, I showed them the stretches and they were able to reproduce them.  I emphasized the \"toe pull\" as the most important stretch.  They need to do the stretches 5 repetitions each, 6-8 times per day.  I explained how to do them at work, at home, before getting out of bed, before getting out of the car, and before getting up from a chair.    We provided them with a night splint.    If they do not improve after 4 months of aggressive stretching and night splinting, the next step will be a short leg fiberglass walking cast worn for 6 weeks.    · Preprinted education was provided to the patient.    She'll begin an aggressive stretching and night splinting routine and return for casting if needed        .            "

## 2018-01-05 ENCOUNTER — OFFICE VISIT (OUTPATIENT)
Dept: FAMILY MEDICINE CLINIC | Facility: CLINIC | Age: 58
End: 2018-01-05

## 2018-01-05 VITALS
BODY MASS INDEX: 23.78 KG/M2 | TEMPERATURE: 98.9 F | HEIGHT: 66 IN | OXYGEN SATURATION: 99 % | SYSTOLIC BLOOD PRESSURE: 108 MMHG | WEIGHT: 148 LBS | DIASTOLIC BLOOD PRESSURE: 70 MMHG | HEART RATE: 83 BPM

## 2018-01-05 DIAGNOSIS — F41.9 ANXIETY: Primary | ICD-10-CM

## 2018-01-05 DIAGNOSIS — K58.1 IRRITABLE BOWEL SYNDROME WITH CONSTIPATION: ICD-10-CM

## 2018-01-05 DIAGNOSIS — E03.9 ACQUIRED HYPOTHYROIDISM: ICD-10-CM

## 2018-01-05 DIAGNOSIS — H61.23 BILATERAL IMPACTED CERUMEN: ICD-10-CM

## 2018-01-05 DIAGNOSIS — G90.50 RSD (REFLEX SYMPATHETIC DYSTROPHY): ICD-10-CM

## 2018-01-05 PROCEDURE — 99214 OFFICE O/P EST MOD 30 MIN: CPT | Performed by: FAMILY MEDICINE

## 2018-01-05 PROCEDURE — 69209 REMOVE IMPACTED EAR WAX UNI: CPT | Performed by: FAMILY MEDICINE

## 2018-01-05 RX ORDER — AMITRIPTYLINE HYDROCHLORIDE 10 MG/1
10 TABLET, FILM COATED ORAL 2 TIMES DAILY
Qty: 60 TABLET | Refills: 11 | Status: SHIPPED | OUTPATIENT
Start: 2018-01-05 | End: 2018-12-20 | Stop reason: SDUPTHER

## 2018-01-05 RX ORDER — LINACLOTIDE 145 UG/1
145 CAPSULE, GELATIN COATED ORAL DAILY
Qty: 30 CAPSULE | Refills: 11 | Status: SHIPPED | OUTPATIENT
Start: 2018-01-05 | End: 2019-03-03 | Stop reason: SDUPTHER

## 2018-01-05 RX ORDER — CLONAZEPAM 0.5 MG/1
0.5 TABLET ORAL DAILY
Qty: 30 TABLET | Refills: 5 | Status: SHIPPED | OUTPATIENT
Start: 2018-01-05 | End: 2018-07-05 | Stop reason: SDUPTHER

## 2018-01-05 RX ORDER — GABAPENTIN 800 MG/1
800 TABLET ORAL 3 TIMES DAILY
Qty: 90 TABLET | Refills: 5 | Status: SHIPPED | OUTPATIENT
Start: 2018-01-05 | End: 2018-07-05 | Stop reason: SDUPTHER

## 2018-01-05 RX ORDER — DULOXETIN HYDROCHLORIDE 60 MG/1
60 CAPSULE, DELAYED RELEASE ORAL DAILY
Qty: 30 CAPSULE | Refills: 11 | Status: SHIPPED | OUTPATIENT
Start: 2018-01-05 | End: 2018-12-20 | Stop reason: SDUPTHER

## 2018-01-05 NOTE — PROGRESS NOTES
Subjective   Lisbeth Perdue is a 57 y.o. female    HPI Comments: Patient presents for recheck and refills regarding her CRPS, IBS with constipation and anxiety.  She reports stable function associated with the medications and reports no adverse effects.  She is complaining today of her ears feeling stopped up and has a history of cerumen impactions.  She requires once or twice a year cerumen removal.    Ear Fullness    Pertinent negatives include no abdominal pain, coughing, diarrhea, headaches, rash, sore throat or vomiting.   Foot Pain   Pertinent negatives include no abdominal pain, chest pain, chills, congestion, coughing, fatigue, fever, headaches, nausea, rash, sore throat, vomiting or weakness.   Oral Pain    Pertinent negatives include no fever.   Constipation   Pertinent negatives include no abdominal pain, back pain, diarrhea, difficulty urinating, fever, nausea or vomiting.       The following portions of the patient's history were reviewed and updated as appropriate: allergies, current medications, past social history and problem list    Review of Systems   Constitutional: Negative for appetite change, chills, fatigue, fever and unexpected weight change.   HENT: Negative for congestion, ear pain and sore throat.    Eyes: Negative for visual disturbance.   Respiratory: Negative for cough, chest tightness and shortness of breath.    Cardiovascular: Negative for chest pain and palpitations.   Gastrointestinal: Positive for abdominal distention and constipation. Negative for abdominal pain, blood in stool, diarrhea, nausea and vomiting.   Endocrine: Negative for cold intolerance and heat intolerance.   Genitourinary: Negative for difficulty urinating and dysuria.   Musculoskeletal: Negative for back pain.   Skin: Negative for color change and rash.   Allergic/Immunologic: Negative for food allergies.   Neurological: Negative for dizziness, tremors, weakness, light-headedness and headaches.    Psychiatric/Behavioral: Positive for dysphoric mood and sleep disturbance. Negative for agitation, behavioral problems, confusion, decreased concentration and suicidal ideas. The patient is nervous/anxious.        Objective     Vitals:    01/05/18 0955   BP: 108/70   Pulse: 83   Temp: 98.9 °F (37.2 °C)   SpO2: 99%       Physical Exam   Constitutional: She is oriented to person, place, and time. She appears well-developed and well-nourished.   HENT:   Head: Normocephalic.   Mouth/Throat: Oropharynx is clear and moist.   Bilateral cerumen impactions cleared with irrigation today   Eyes: Conjunctivae are normal. Pupils are equal, round, and reactive to light.   Neck: Normal range of motion. Neck supple. No JVD present. No thyromegaly present.   Cardiovascular: Normal rate and regular rhythm.    Pulmonary/Chest: Effort normal and breath sounds normal.   Abdominal: Soft. Bowel sounds are normal. There is no tenderness.   Musculoskeletal: She exhibits no edema or deformity.   Lymphadenopathy:     She has no cervical adenopathy.   Neurological: She is alert and oriented to person, place, and time.   Skin: Skin is warm and dry.   Psychiatric: She has a normal mood and affect. Her behavior is normal.   Nursing note and vitals reviewed.      Assessment/Plan   Problem List Items Addressed This Visit        Digestive    Irritable bowel syndrome with constipation    Relevant Medications    LINZESS 145 MCG capsule capsule       Nervous and Auditory    RSD (reflex sympathetic dystrophy)    Relevant Medications    diclofenac (VOLTAREN) 1 % gel gel    gabapentin (NEURONTIN) 800 MG tablet    amitriptyline (ELAVIL) 10 MG tablet      Other Visit Diagnoses     Anxiety    -  Primary    Relevant Medications    clonazePAM (KlonoPIN) 0.5 MG tablet    DULoxetine (CYMBALTA) 60 MG capsule    Bilateral impacted cerumen        Acquired hypothyroidism

## 2018-07-05 ENCOUNTER — OFFICE VISIT (OUTPATIENT)
Dept: FAMILY MEDICINE CLINIC | Facility: CLINIC | Age: 58
End: 2018-07-05

## 2018-07-05 VITALS
BODY MASS INDEX: 23.75 KG/M2 | SYSTOLIC BLOOD PRESSURE: 110 MMHG | OXYGEN SATURATION: 100 % | TEMPERATURE: 97.4 F | HEART RATE: 76 BPM | HEIGHT: 66 IN | DIASTOLIC BLOOD PRESSURE: 72 MMHG | WEIGHT: 147.8 LBS

## 2018-07-05 DIAGNOSIS — Z00.00 ENCOUNTER FOR ANNUAL PHYSICAL EXAM: Primary | ICD-10-CM

## 2018-07-05 DIAGNOSIS — K58.1 IRRITABLE BOWEL SYNDROME WITH CONSTIPATION: ICD-10-CM

## 2018-07-05 DIAGNOSIS — G90.50 RSD (REFLEX SYMPATHETIC DYSTROPHY): ICD-10-CM

## 2018-07-05 DIAGNOSIS — F41.9 ANXIETY: ICD-10-CM

## 2018-07-05 DIAGNOSIS — E03.9 ACQUIRED HYPOTHYROIDISM: ICD-10-CM

## 2018-07-05 DIAGNOSIS — R53.83 FATIGUE, UNSPECIFIED TYPE: ICD-10-CM

## 2018-07-05 PROCEDURE — 99396 PREV VISIT EST AGE 40-64: CPT | Performed by: FAMILY MEDICINE

## 2018-07-05 RX ORDER — CLONAZEPAM 0.5 MG/1
0.5 TABLET ORAL DAILY
Qty: 30 TABLET | Refills: 5 | Status: SHIPPED | OUTPATIENT
Start: 2018-07-05 | End: 2020-12-30 | Stop reason: SDUPTHER

## 2018-07-05 RX ORDER — GABAPENTIN 800 MG/1
800 TABLET ORAL 3 TIMES DAILY
Qty: 90 TABLET | Refills: 5 | Status: SHIPPED | OUTPATIENT
Start: 2018-07-05 | End: 2020-12-30 | Stop reason: SDUPTHER

## 2018-07-05 NOTE — PROGRESS NOTES
Subjective   Lisbeth Perdue is a 57 y.o. female    Patient presents for annual physical examination.  Chronic medical problems include anxiety, RSD right foot, hypothyroidism, IBS with constipation, and menopause.  Due for refills today on clonazepam and gabapentin.  Shiv is reviewed and is appropriate.  Primary complaint today is that of generalized fatigue.  She has had slight weight gain and is frustrated.  Admits to no regular exercise and follows no particular diet.  She is anxious to get her lab studies completed.      Pain   Associated symptoms include chest pain, numbness and weakness. Pertinent negatives include no abdominal pain, congestion, coughing, fatigue, fever, headaches or nausea.   Chest Pain    Associated symptoms include numbness and weakness. Pertinent negatives include no abdominal pain, back pain, cough, dizziness, fever, headaches, nausea, palpitations or shortness of breath.       The following portions of the patient's history were reviewed and updated as appropriate: allergies, current medications, past social history and problem list    Review of Systems   Constitutional: Negative for appetite change, fatigue and fever.   HENT: Negative.  Negative for congestion and trouble swallowing.    Eyes: Negative.    Respiratory: Negative.  Negative for cough, choking, chest tightness and shortness of breath.    Cardiovascular: Positive for chest pain. Negative for palpitations.   Gastrointestinal: Negative.  Negative for abdominal distention, abdominal pain, diarrhea and nausea.   Endocrine: Negative.    Genitourinary: Negative.    Musculoskeletal: Negative.  Negative for back pain.   Skin: Negative.    Allergic/Immunologic: Negative.    Neurological: Positive for weakness and numbness. Negative for dizziness, tremors, syncope, light-headedness and headaches.   Hematological: Negative.    Psychiatric/Behavioral: Negative.  Negative for agitation, behavioral problems, confusion, decreased  concentration, dysphoric mood and suicidal ideas. The patient is not nervous/anxious.    All other systems reviewed and are negative.      Objective     Vitals:    07/05/18 0904   BP: 110/72   Pulse: 76   Temp: 97.4 °F (36.3 °C)   SpO2: 100%       Physical Exam   Constitutional: She is oriented to person, place, and time. She appears well-developed and well-nourished. No distress.   HENT:   Head: Normocephalic and atraumatic.   Right Ear: External ear normal.   Left Ear: External ear normal.   Nose: Nose normal.   Mouth/Throat: Oropharynx is clear and moist.   Eyes: Conjunctivae and EOM are normal. Pupils are equal, round, and reactive to light.   Neck: Normal range of motion. Neck supple. No JVD present. Carotid bruit is not present. No thyromegaly present.   Cardiovascular: Normal rate, regular rhythm, normal heart sounds and intact distal pulses.    No murmur heard.  Pulmonary/Chest: Effort normal and breath sounds normal. No respiratory distress. She has no rales. She exhibits no tenderness.   Abdominal: Soft. Bowel sounds are normal. She exhibits no mass. There is no tenderness.   Musculoskeletal: Normal range of motion. She exhibits no edema.   Lymphadenopathy:     She has no cervical adenopathy.   Neurological: She is alert and oriented to person, place, and time. She has normal reflexes. No cranial nerve deficit.   Skin: Skin is warm and dry. She is not diaphoretic.   Psychiatric: She has a normal mood and affect. Her behavior is normal.   Nursing note and vitals reviewed.      Assessment/Plan   Problem List Items Addressed This Visit        Digestive    Irritable bowel syndrome with constipation       Nervous and Auditory    RSD (reflex sympathetic dystrophy)    Relevant Medications    gabapentin (NEURONTIN) 800 MG tablet    Other Relevant Orders    CBC (No Diff)    Comprehensive Metabolic Panel      Other Visit Diagnoses     Encounter for annual physical exam    -  Primary    Relevant Orders    CBC (No  Diff)    Comprehensive Metabolic Panel    Lipid Panel    TSH    T4, Free    Vitamin D 25 Hydroxy    Anxiety        Relevant Medications    clonazePAM (KlonoPIN) 0.5 MG tablet    Other Relevant Orders    TSH    T4, Free    Acquired hypothyroidism        Relevant Orders    TSH    T4, Free    Fatigue, unspecified type        Relevant Orders    CBC (No Diff)    Comprehensive Metabolic Panel    TSH    T4, Free    Vitamin D 25 Hydroxy        Patient is counseled during today's visit regarding preventative health measures to include use of seatbelts, medication safety, healthy diet and regular exercise.

## 2018-07-06 ENCOUNTER — LAB (OUTPATIENT)
Dept: LAB | Facility: HOSPITAL | Age: 58
End: 2018-07-06

## 2018-07-06 DIAGNOSIS — R53.83 FATIGUE, UNSPECIFIED TYPE: ICD-10-CM

## 2018-07-06 DIAGNOSIS — F41.9 ANXIETY: ICD-10-CM

## 2018-07-06 DIAGNOSIS — G90.50 RSD (REFLEX SYMPATHETIC DYSTROPHY): ICD-10-CM

## 2018-07-06 DIAGNOSIS — E03.9 ACQUIRED HYPOTHYROIDISM: ICD-10-CM

## 2018-07-06 DIAGNOSIS — Z00.00 ENCOUNTER FOR ANNUAL PHYSICAL EXAM: ICD-10-CM

## 2018-07-06 LAB
25(OH)D3 SERPL-MCNC: 29.6 NG/ML
ALBUMIN SERPL-MCNC: 4.14 G/DL (ref 3.2–4.8)
ALBUMIN/GLOB SERPL: 1.8 G/DL (ref 1.5–2.5)
ALP SERPL-CCNC: 40 U/L (ref 25–100)
ALT SERPL W P-5'-P-CCNC: 22 U/L (ref 7–40)
ANION GAP SERPL CALCULATED.3IONS-SCNC: 7 MMOL/L (ref 3–11)
ARTICHOKE IGE QN: 129 MG/DL (ref 0–130)
AST SERPL-CCNC: 30 U/L (ref 0–33)
BILIRUB SERPL-MCNC: 0.7 MG/DL (ref 0.3–1.2)
BUN BLD-MCNC: 10 MG/DL (ref 9–23)
BUN/CREAT SERPL: 11.5 (ref 7–25)
CALCIUM SPEC-SCNC: 8.8 MG/DL (ref 8.7–10.4)
CHLORIDE SERPL-SCNC: 106 MMOL/L (ref 99–109)
CHOLEST SERPL-MCNC: 250 MG/DL (ref 0–200)
CO2 SERPL-SCNC: 29 MMOL/L (ref 20–31)
CREAT BLD-MCNC: 0.87 MG/DL (ref 0.6–1.3)
DEPRECATED RDW RBC AUTO: 42.8 FL (ref 37–54)
ERYTHROCYTE [DISTWIDTH] IN BLOOD BY AUTOMATED COUNT: 13.1 % (ref 11.3–14.5)
GFR SERPL CREATININE-BSD FRML MDRD: 67 ML/MIN/1.73
GLOBULIN UR ELPH-MCNC: 2.4 GM/DL
GLUCOSE BLD-MCNC: 91 MG/DL (ref 70–100)
HCT VFR BLD AUTO: 39.1 % (ref 34.5–44)
HDLC SERPL-MCNC: 96 MG/DL (ref 40–60)
HGB BLD-MCNC: 12.6 G/DL (ref 11.5–15.5)
MCH RBC QN AUTO: 28.8 PG (ref 27–31)
MCHC RBC AUTO-ENTMCNC: 32.2 G/DL (ref 32–36)
MCV RBC AUTO: 89.5 FL (ref 80–99)
PLATELET # BLD AUTO: 284 10*3/MM3 (ref 150–450)
PMV BLD AUTO: 11.4 FL (ref 6–12)
POTASSIUM BLD-SCNC: 4.5 MMOL/L (ref 3.5–5.5)
PROT SERPL-MCNC: 6.5 G/DL (ref 5.7–8.2)
RBC # BLD AUTO: 4.37 10*6/MM3 (ref 3.89–5.14)
SODIUM BLD-SCNC: 142 MMOL/L (ref 132–146)
T4 FREE SERPL-MCNC: 0.85 NG/DL (ref 0.89–1.76)
TRIGL SERPL-MCNC: 69 MG/DL (ref 0–150)
TSH SERPL DL<=0.05 MIU/L-ACNC: 2.34 MIU/ML (ref 0.35–5.35)
WBC NRBC COR # BLD: 5.31 10*3/MM3 (ref 3.5–10.8)

## 2018-07-06 PROCEDURE — 80061 LIPID PANEL: CPT

## 2018-07-06 PROCEDURE — 84443 ASSAY THYROID STIM HORMONE: CPT

## 2018-07-06 PROCEDURE — 36415 COLL VENOUS BLD VENIPUNCTURE: CPT

## 2018-07-06 PROCEDURE — 84439 ASSAY OF FREE THYROXINE: CPT

## 2018-07-06 PROCEDURE — 85027 COMPLETE CBC AUTOMATED: CPT

## 2018-07-06 PROCEDURE — 82306 VITAMIN D 25 HYDROXY: CPT

## 2018-07-06 PROCEDURE — 80053 COMPREHEN METABOLIC PANEL: CPT

## 2018-07-20 ENCOUNTER — TELEPHONE (OUTPATIENT)
Dept: FAMILY MEDICINE CLINIC | Facility: CLINIC | Age: 58
End: 2018-07-20

## 2018-07-20 NOTE — TELEPHONE ENCOUNTER
----- Message from Haydee Gruber sent at 7/20/2018  8:22 AM EDT -----  Contact: PT  REQUESTING BLOODWORK RESULTS

## 2018-07-24 ENCOUNTER — TELEPHONE (OUTPATIENT)
Dept: FAMILY MEDICINE CLINIC | Facility: CLINIC | Age: 58
End: 2018-07-24

## 2018-07-24 NOTE — TELEPHONE ENCOUNTER
----- Message from Haydee Gruber sent at 7/24/2018 10:47 AM EDT -----  Contact: PT  REQUESTS LAB RESULTS FROM 7/6...CALLED ON Friday BUT HAS NOT HAD A RETURN CALL

## 2018-07-24 NOTE — TELEPHONE ENCOUNTER
Patient notified of normal/negative labs and that she will be receiving a letter in the mail this week.

## 2018-08-07 ENCOUNTER — OFFICE VISIT (OUTPATIENT)
Dept: FAMILY MEDICINE CLINIC | Facility: CLINIC | Age: 58
End: 2018-08-07

## 2018-08-07 VITALS
BODY MASS INDEX: 25.68 KG/M2 | DIASTOLIC BLOOD PRESSURE: 76 MMHG | SYSTOLIC BLOOD PRESSURE: 114 MMHG | HEIGHT: 66 IN | TEMPERATURE: 98.2 F | OXYGEN SATURATION: 98 % | HEART RATE: 108 BPM | RESPIRATION RATE: 16 BRPM | WEIGHT: 159.8 LBS

## 2018-08-07 DIAGNOSIS — J40 BRONCHITIS: Primary | ICD-10-CM

## 2018-08-07 PROCEDURE — 99213 OFFICE O/P EST LOW 20 MIN: CPT | Performed by: PHYSICIAN ASSISTANT

## 2018-08-07 RX ORDER — AZITHROMYCIN 250 MG/1
TABLET, FILM COATED ORAL
Qty: 6 TABLET | Refills: 1 | Status: SHIPPED | OUTPATIENT
Start: 2018-08-07 | End: 2019-10-02

## 2018-08-07 RX ORDER — PREDNISONE 20 MG/1
20 TABLET ORAL 2 TIMES DAILY
Qty: 14 TABLET | Refills: 0 | Status: SHIPPED | OUTPATIENT
Start: 2018-08-07 | End: 2019-10-02

## 2018-08-07 NOTE — PROGRESS NOTES
Subjective   Lisbeth Perdue is a 58 y.o. female  Cough (RAMESH, PND, sore throat for several weeks. Treating with OTC meds.)      Cough   This is a new problem. The current episode started 1 to 4 weeks ago. The problem has been waxing and waning. The cough is productive of purulent sputum and productive of brown sputum. Associated symptoms include ear pain, headaches, nasal congestion, postnasal drip, rhinorrhea, sweats and wheezing. Pertinent negatives include no chest pain, chills, fever, sore throat or shortness of breath. The symptoms are aggravated by lying down. She has tried oral steroids for the symptoms. The treatment provided moderate relief. There is no history of COPD.       The following portions of the patient's history were reviewed and updated as appropriate: allergies, current medications, past social history and problem list    Review of Systems   Constitutional: Negative for chills, fatigue and fever.   HENT: Positive for congestion, ear pain, postnasal drip, rhinorrhea and sinus pressure. Negative for sore throat.    Eyes: Positive for pain.   Respiratory: Positive for cough, chest tightness and wheezing. Negative for shortness of breath.    Cardiovascular: Negative for chest pain.   Gastrointestinal: Negative for nausea.   Neurological: Positive for headaches. Negative for dizziness.   Hematological: Negative for adenopathy.       Objective     Vitals:    08/07/18 1323   BP: 114/76   Pulse: 108   Resp: 16   Temp: 98.2 °F (36.8 °C)   SpO2: 98%       Physical Exam   Constitutional: She appears well-developed and well-nourished. No distress.   HENT:   Head: Normocephalic and atraumatic.   Right Ear: Tympanic membrane and ear canal normal.   Left Ear: Tympanic membrane and ear canal normal.   Nose: Mucosal edema, rhinorrhea and sinus tenderness present. Right sinus exhibits maxillary sinus tenderness and frontal sinus tenderness. Left sinus exhibits maxillary sinus tenderness and frontal sinus  tenderness.   Mouth/Throat: Oropharynx is clear and moist. No oropharyngeal exudate.   Eyes: Pupils are equal, round, and reactive to light.   Neck: Neck supple. No JVD present.   Cardiovascular: Normal rate, regular rhythm and normal heart sounds.    No murmur heard.  Pulmonary/Chest: Effort normal and breath sounds normal. No stridor. No respiratory distress.   Musculoskeletal: She exhibits no edema.   Lymphadenopathy:     She has no cervical adenopathy.   Skin: She is not diaphoretic.   Nursing note and vitals reviewed.      Assessment/Plan     Diagnoses and all orders for this visit:    Bronchitis  -     azithromycin (ZITHROMAX Z-SANDRO) 250 MG tablet; Take 2 tablets the first day, then 1 tablet daily for 4 days.  -     predniSONE (DELTASONE) 20 MG tablet; Take 1 tablet by mouth 2 (Two) Times a Day.    Promethazine with codeine 1 teaspoon every 6-8 hours dispense 120 ML's

## 2018-12-20 ENCOUNTER — OFFICE VISIT (OUTPATIENT)
Dept: FAMILY MEDICINE CLINIC | Facility: CLINIC | Age: 58
End: 2018-12-20

## 2018-12-20 VITALS
BODY MASS INDEX: 26.52 KG/M2 | DIASTOLIC BLOOD PRESSURE: 80 MMHG | WEIGHT: 165 LBS | SYSTOLIC BLOOD PRESSURE: 110 MMHG | HEART RATE: 97 BPM | TEMPERATURE: 98.7 F | OXYGEN SATURATION: 97 % | HEIGHT: 66 IN

## 2018-12-20 DIAGNOSIS — K21.9 GASTROESOPHAGEAL REFLUX DISEASE WITHOUT ESOPHAGITIS: ICD-10-CM

## 2018-12-20 DIAGNOSIS — F41.9 ANXIETY: ICD-10-CM

## 2018-12-20 DIAGNOSIS — G90.50 RSD (REFLEX SYMPATHETIC DYSTROPHY): Primary | ICD-10-CM

## 2018-12-20 PROCEDURE — 99214 OFFICE O/P EST MOD 30 MIN: CPT | Performed by: PHYSICIAN ASSISTANT

## 2018-12-20 RX ORDER — LEVOTHYROXINE SODIUM 0.05 MG/1
50 TABLET ORAL DAILY
Qty: 30 TABLET | Refills: 11 | Status: SHIPPED | OUTPATIENT
Start: 2018-12-20 | End: 2019-10-02 | Stop reason: DRUGHIGH

## 2018-12-20 RX ORDER — AMITRIPTYLINE HYDROCHLORIDE 10 MG/1
10 TABLET, FILM COATED ORAL 2 TIMES DAILY
Qty: 60 TABLET | Refills: 11 | Status: SHIPPED | OUTPATIENT
Start: 2018-12-20 | End: 2019-12-20 | Stop reason: SDUPTHER

## 2018-12-20 RX ORDER — OMEPRAZOLE 20 MG/1
40 CAPSULE, DELAYED RELEASE ORAL DAILY
Qty: 30 CAPSULE | Refills: 1 | Status: SHIPPED | OUTPATIENT
Start: 2018-12-20 | End: 2019-12-20

## 2018-12-20 RX ORDER — DULOXETIN HYDROCHLORIDE 60 MG/1
60 CAPSULE, DELAYED RELEASE ORAL DAILY
Qty: 30 CAPSULE | Refills: 11 | Status: SHIPPED | OUTPATIENT
Start: 2018-12-20 | End: 2019-12-20 | Stop reason: SDUPTHER

## 2018-12-20 NOTE — PROGRESS NOTES
Subjective   Lisbeth Perdue is a 58 y.o. female  RSD (refills on gabapentin, cymbalta); mouth problem (refills on clonazepam and amitriptyline); and Heartburn      History of Present Illness  Patient pleasant 58-year-old white female comes in complaining RSD needs refill of gabapentin and Cymbalta also has TMJ needs refill of clonazepam and amitriptyline meds working well no problems or complaints has anxiety needs refill medication    Also complains of GERD symptoms she states she's had reflux symptoms for last couple of weeks she took 2 weeks worth of omeprazole over-the-counter which resolved symptoms she has a nonproductive cough which is aggravated by GERD symptoms no fever no chills cough this.  When she started taking over-the-counter omeprazole and vitals are worse after eating  The following portions of the patient's history were reviewed and updated as appropriate: allergies, current medications, past social history and problem list    Review of Systems   Constitutional: Negative for appetite change, diaphoresis, fatigue and unexpected weight change.   Eyes: Negative for visual disturbance.   Respiratory: Negative for cough, chest tightness and shortness of breath.    Cardiovascular: Negative for chest pain, palpitations and leg swelling.   Gastrointestinal: Negative for diarrhea, nausea and vomiting.   Endocrine: Negative for polydipsia, polyphagia and polyuria.   Skin: Negative for color change and rash.   Neurological: Negative for dizziness, syncope, weakness, light-headedness, numbness and headaches.       Objective     Vitals:    12/20/18 1136   BP: 110/80   Pulse: 97   Temp: 98.7 °F (37.1 °C)   SpO2: 97%       Physical Exam   Constitutional: She appears well-developed and well-nourished.   Neck: Neck supple. No JVD present. No thyromegaly present.   Cardiovascular: Normal rate, regular rhythm, normal heart sounds, intact distal pulses and normal pulses.   No murmur heard.  Pulmonary/Chest: Effort  normal and breath sounds normal. No respiratory distress.   Abdominal: Soft. Bowel sounds are normal. There is no hepatosplenomegaly. There is no tenderness.   Musculoskeletal: She exhibits no edema.   Lymphadenopathy:     She has no cervical adenopathy.   Neurological: No sensory deficit.   Skin: Skin is warm and dry. She is not diaphoretic.   Nursing note and vitals reviewed.      Assessment/Plan     Diagnoses and all orders for this visit:    RSD (reflex sympathetic dystrophy)  -     amitriptyline (ELAVIL) 10 MG tablet; Take 1 tablet by mouth 2 (Two) Times a Day.    Anxiety  -     DULoxetine (CYMBALTA) 60 MG capsule; Take 1 capsule by mouth Daily.    Gastroesophageal reflux disease without esophagitis    Other orders  -     levothyroxine (SYNTHROID, LEVOTHROID) 50 MCG tablet; Take 1 tablet by mouth Daily.    Refill Klonopin 0.5 mg 1 by mouth everyday dispense 30×5 refills    Gabapentin 800 mg 3 times a day

## 2019-03-03 DIAGNOSIS — K58.1 IRRITABLE BOWEL SYNDROME WITH CONSTIPATION: ICD-10-CM

## 2019-03-05 RX ORDER — LINACLOTIDE 145 UG/1
145 CAPSULE, GELATIN COATED ORAL DAILY
Qty: 30 CAPSULE | Refills: 11 | Status: SHIPPED | OUTPATIENT
Start: 2019-03-05 | End: 2019-07-10 | Stop reason: CLARIF

## 2019-06-24 ENCOUNTER — OFFICE VISIT (OUTPATIENT)
Dept: FAMILY MEDICINE CLINIC | Facility: CLINIC | Age: 59
End: 2019-06-24

## 2019-06-24 VITALS
DIASTOLIC BLOOD PRESSURE: 72 MMHG | RESPIRATION RATE: 16 BRPM | SYSTOLIC BLOOD PRESSURE: 110 MMHG | BODY MASS INDEX: 22.5 KG/M2 | HEIGHT: 66 IN | WEIGHT: 140 LBS | HEART RATE: 90 BPM | TEMPERATURE: 98.2 F | OXYGEN SATURATION: 94 %

## 2019-06-24 DIAGNOSIS — G90.50 RSD (REFLEX SYMPATHETIC DYSTROPHY): ICD-10-CM

## 2019-06-24 DIAGNOSIS — F41.9 ANXIETY: ICD-10-CM

## 2019-06-24 PROCEDURE — 99213 OFFICE O/P EST LOW 20 MIN: CPT | Performed by: PHYSICIAN ASSISTANT

## 2019-06-24 RX ORDER — GABAPENTIN 800 MG/1
800 TABLET ORAL 3 TIMES DAILY
Qty: 90 TABLET | Refills: 5 | Status: CANCELLED | OUTPATIENT
Start: 2019-06-24

## 2019-06-24 RX ORDER — CLONAZEPAM 0.5 MG/1
0.5 TABLET ORAL DAILY
Qty: 30 TABLET | Refills: 5 | Status: CANCELLED | OUTPATIENT
Start: 2019-06-24

## 2019-06-24 NOTE — PROGRESS NOTES
Subjective   Lisbeth Perdue is a 58 y.o. female  Follow-up (RSD)      History of Present Illness  Patient is a pleasant 58-year-old white female who comes in complaining of RSD needs refill of gabapentin meds working well no problems or complaints no SI/HI no shortness of breath no chest pain meds working well patient also comes in for anxiety needs refill medication  Patient comes in follow-up of anxiety needs refill of Klonopin meds working well      The following portions of the patient's history were reviewed and updated as appropriate: allergies, current medications, past social history and problem list    Review of Systems   Constitutional: Negative for appetite change, diaphoresis, fatigue and unexpected weight change.   Eyes: Negative for visual disturbance.   Respiratory: Negative for cough, chest tightness and shortness of breath.    Cardiovascular: Negative for chest pain, palpitations and leg swelling.   Gastrointestinal: Negative for diarrhea, nausea and vomiting.   Endocrine: Negative for polydipsia, polyphagia and polyuria.   Skin: Negative for color change and rash.   Neurological: Negative for dizziness, syncope, weakness, light-headedness, numbness and headaches.       Objective     Vitals:    06/24/19 1149   BP: 110/72   Pulse: 90   Resp: 16   Temp: 98.2 °F (36.8 °C)   SpO2: 94%       Physical Exam   Constitutional: She appears well-developed and well-nourished.   Neck: Neck supple. No JVD present. No thyromegaly present.   Cardiovascular: Normal rate, regular rhythm, normal heart sounds, intact distal pulses and normal pulses.   No murmur heard.  Pulmonary/Chest: Effort normal and breath sounds normal. No respiratory distress.   Abdominal: Soft. Bowel sounds are normal. There is no hepatosplenomegaly. There is no tenderness.   Musculoskeletal: She exhibits no edema.   Lymphadenopathy:     She has no cervical adenopathy.   Neurological: No sensory deficit.   Skin: Skin is warm and dry. She is  not diaphoretic.   Nursing note and vitals reviewed.      Assessment/Plan     Diagnoses and all orders for this visit:    Anxiety  -     clonazePAM (KlonoPIN) 0.5 MG tablet; Take 1 tablet by mouth Daily.    RSD (reflex sympathetic dystrophy)  -     gabapentin (NEURONTIN) 800 MG tablet; Take 1 tablet by mouth 3 (Three) Times a Day.    REFILL X 6

## 2019-07-09 ENCOUNTER — TELEPHONE (OUTPATIENT)
Dept: FAMILY MEDICINE CLINIC | Facility: CLINIC | Age: 59
End: 2019-07-09

## 2019-07-09 NOTE — TELEPHONE ENCOUNTER
----- Message from Scipio Hernesto, RegSched Rep sent at 7/9/2019  8:27 AM EDT -----  PT CALLED AND WOULD LIKE A CALL BACK ABOUT HER MED  (LINZESS) HER INS CALLED STATING THAT HER MED WOULD NOT BE COVERED NO LONGER AND ADVISED HER TO SWITCH TO TRULANCE. PLEASE ADVISE. THANKS

## 2019-10-02 ENCOUNTER — PROCEDURE VISIT (OUTPATIENT)
Dept: FAMILY MEDICINE CLINIC | Facility: CLINIC | Age: 59
End: 2019-10-02

## 2019-10-02 VITALS
SYSTOLIC BLOOD PRESSURE: 102 MMHG | HEIGHT: 66 IN | RESPIRATION RATE: 16 BRPM | WEIGHT: 130.2 LBS | DIASTOLIC BLOOD PRESSURE: 64 MMHG | BODY MASS INDEX: 20.93 KG/M2 | OXYGEN SATURATION: 99 % | HEART RATE: 82 BPM

## 2019-10-02 DIAGNOSIS — H92.03 EAR PAIN, BILATERAL: Primary | ICD-10-CM

## 2019-10-02 DIAGNOSIS — H61.23 BILATERAL IMPACTED CERUMEN: ICD-10-CM

## 2019-10-02 PROCEDURE — 69209 REMOVE IMPACTED EAR WAX UNI: CPT | Performed by: PHYSICIAN ASSISTANT

## 2019-10-02 PROCEDURE — 99212 OFFICE O/P EST SF 10 MIN: CPT | Performed by: PHYSICIAN ASSISTANT

## 2019-10-02 RX ORDER — LEVOTHYROXINE SODIUM 0.03 MG/1
1 TABLET ORAL DAILY
Refills: 1 | COMMUNITY
Start: 2019-09-23 | End: 2021-02-22 | Stop reason: SDUPTHER

## 2019-10-02 NOTE — PROGRESS NOTES
Subjective   Bilateral ear pain x2 weeks    History of Present Illness  Patient is a pleasant 59-year-old white female who comes in complaining of bilateral ear pain cannot hear out of right or left ear patient thinks she has a wax impaction states she has no fever no chills no sinus symptoms no sinus pressure she has not used any over-the-counter medication  The following portions of the patient's history were reviewed and updated as appropriate: allergies, current medications, past social history and problem list    Review of Systems   Constitutional: Negative for appetite change, diaphoresis, fatigue and unexpected weight change.   HENT: Positive for ear pain.    Eyes: Negative for visual disturbance.   Respiratory: Negative for cough, chest tightness and shortness of breath.    Cardiovascular: Negative for chest pain, palpitations and leg swelling.   Gastrointestinal: Negative for diarrhea, nausea and vomiting.   Endocrine: Negative for polydipsia, polyphagia and polyuria.   Skin: Negative for color change and rash.   Neurological: Negative for dizziness, syncope, weakness, light-headedness, numbness and headaches.       Objective     Vitals:    10/02/19 1609   BP: 102/64   Pulse: 82   Resp: 16   SpO2: 99%       Physical Exam   Constitutional: She appears well-developed and well-nourished.   HENT:   Ears:    Neck: Neck supple. No JVD present. No thyromegaly present.   Cardiovascular: Normal rate, regular rhythm, normal heart sounds, intact distal pulses and normal pulses.   No murmur heard.  Pulmonary/Chest: Effort normal and breath sounds normal. No respiratory distress.   Abdominal: Soft. Bowel sounds are normal. There is no hepatosplenomegaly. There is no tenderness.   Musculoskeletal: She exhibits no edema.   Lymphadenopathy:     She has no cervical adenopathy.   Neurological: No sensory deficit.   Skin: Skin is warm and dry. She is not diaphoretic.   Nursing note and vitals reviewed.      Assessment/Plan      Diagnoses and all orders for this visit:    Ear pain, bilateral    Bilateral impacted cerumen    Other orders  -     levothyroxine (SYNTHROID, LEVOTHROID) 25 MCG tablet; Take 1 tablet by mouth Daily.    Using ear lavage both right and left ears were cleaned and wax removed bilaterally without difficulty

## 2019-12-10 ENCOUNTER — TELEPHONE (OUTPATIENT)
Dept: FAMILY MEDICINE CLINIC | Facility: CLINIC | Age: 59
End: 2019-12-10

## 2019-12-10 NOTE — TELEPHONE ENCOUNTER
PT CALLED STATED SHE HAS AN UPCOMING APPT ON 12/20 AND IS REQUESTING LAB ORDERS BE PUT IN SO SHE MAY GET THEM DRAWN BEFORE HER APPT ALSO PT WOULD LIKE FOR SOMEONE TO CALL HER BACK WHEN THE ORDERS HAVE BEEN ADDED    PT  NUMBER: [phone]

## 2019-12-17 NOTE — TELEPHONE ENCOUNTER
Patient informed that labs aren't ordered prior to appointments. She was understanding and confirmed that she will be here December 20.

## 2019-12-20 ENCOUNTER — OFFICE VISIT (OUTPATIENT)
Dept: FAMILY MEDICINE CLINIC | Facility: CLINIC | Age: 59
End: 2019-12-20

## 2019-12-20 VITALS
WEIGHT: 133.8 LBS | HEART RATE: 80 BPM | SYSTOLIC BLOOD PRESSURE: 108 MMHG | BODY MASS INDEX: 21.5 KG/M2 | TEMPERATURE: 97.8 F | OXYGEN SATURATION: 99 % | DIASTOLIC BLOOD PRESSURE: 56 MMHG | HEIGHT: 66 IN

## 2019-12-20 DIAGNOSIS — F41.1 GAD (GENERALIZED ANXIETY DISORDER): Primary | ICD-10-CM

## 2019-12-20 DIAGNOSIS — G90.50 RSD (REFLEX SYMPATHETIC DYSTROPHY): ICD-10-CM

## 2019-12-20 DIAGNOSIS — F41.9 ANXIETY: ICD-10-CM

## 2019-12-20 PROCEDURE — 99213 OFFICE O/P EST LOW 20 MIN: CPT | Performed by: PHYSICIAN ASSISTANT

## 2019-12-20 RX ORDER — DULOXETIN HYDROCHLORIDE 60 MG/1
60 CAPSULE, DELAYED RELEASE ORAL DAILY
Qty: 30 CAPSULE | Refills: 11 | Status: SHIPPED | OUTPATIENT
Start: 2019-12-20 | End: 2020-06-19 | Stop reason: SDUPTHER

## 2019-12-20 RX ORDER — DULOXETIN HYDROCHLORIDE 60 MG/1
60 CAPSULE, DELAYED RELEASE ORAL DAILY
Qty: 30 CAPSULE | Refills: 11 | Status: SHIPPED | OUTPATIENT
Start: 2019-12-20 | End: 2019-12-20 | Stop reason: SDUPTHER

## 2019-12-20 RX ORDER — AMITRIPTYLINE HYDROCHLORIDE 10 MG/1
10 TABLET, FILM COATED ORAL 2 TIMES DAILY
Qty: 60 TABLET | Refills: 11 | Status: SHIPPED | OUTPATIENT
Start: 2019-12-20 | End: 2020-06-19 | Stop reason: SDUPTHER

## 2019-12-20 RX ORDER — AMITRIPTYLINE HYDROCHLORIDE 10 MG/1
10 TABLET, FILM COATED ORAL 2 TIMES DAILY
Qty: 60 TABLET | Refills: 11 | Status: SHIPPED | OUTPATIENT
Start: 2019-12-20 | End: 2019-12-20 | Stop reason: SDUPTHER

## 2019-12-20 NOTE — PROGRESS NOTES
Subjective   Lisbeth Perdue is a 59 y.o. female  Peripheral Neuropathy (Follow up on Neuropathy, RF Gabapentin 800mg TID, Amitriptyline and Cymbalta ) and Anxiety (Follow up on anxiety, Rd on Clonazepam )      History of Present Illness  Patient is a pleasant 59-year-old white female who comes in complaining of peripheral neuropathy reflex sympathetic dystrophy refill gabapentin 3 mg 3 times a day along with amitriptyline and Cymbalta meds are currently controlling symptoms patient is currently stable    Patient comes in complaining of generalized anxiety disorder needs refill of Klonopin she takes 1 a day  The following portions of the patient's history were reviewed and updated as appropriate: allergies, current medications, past social history and problem list    Review of Systems   Constitutional: Negative for appetite change, diaphoresis, fatigue and unexpected weight change.   Eyes: Negative for visual disturbance.   Respiratory: Negative for cough, chest tightness and shortness of breath.    Cardiovascular: Negative for chest pain, palpitations and leg swelling.   Gastrointestinal: Negative for diarrhea, nausea and vomiting.   Endocrine: Negative for polydipsia, polyphagia and polyuria.   Skin: Negative for color change and rash.   Neurological: Negative for dizziness, syncope, weakness, light-headedness, numbness and headaches.       Objective     Vitals:    12/20/19 1205   BP: 108/56   Pulse: 80   Temp: 97.8 °F (36.6 °C)   SpO2: 99%       Physical Exam   Constitutional: She appears well-developed and well-nourished.   Neck: Neck supple. No JVD present. No thyromegaly present.   Cardiovascular: Normal rate, regular rhythm, normal heart sounds, intact distal pulses and normal pulses.   No murmur heard.  Pulmonary/Chest: Effort normal and breath sounds normal. No respiratory distress.   Abdominal: Soft. Bowel sounds are normal. There is no hepatosplenomegaly. There is no tenderness.   Musculoskeletal: She  exhibits no edema.   Lymphadenopathy:     She has no cervical adenopathy.   Neurological: No sensory deficit.   Skin: Skin is warm and dry. She is not diaphoretic.   Nursing note and vitals reviewed.      Assessment/Plan     Diagnoses and all orders for this visit:    STEPH (generalized anxiety disorder)    RSD (reflex sympathetic dystrophy)  -     Discontinue: amitriptyline (ELAVIL) 10 MG tablet; Take 1 tablet by mouth 2 (Two) Times a Day.  -     amitriptyline (ELAVIL) 10 MG tablet; Take 1 tablet by mouth 2 (Two) Times a Day.    Anxiety  -     Discontinue: DULoxetine (CYMBALTA) 60 MG capsule; Take 1 capsule by mouth Daily.  -     DULoxetine (CYMBALTA) 60 MG capsule; Take 1 capsule by mouth Daily.    #1 gabapentin 800 mg 3 times a day dispense dispense 90×5 refills    #2  K;onopin 5 mg 1 p.o. every day dispense 30×5 refills

## 2020-01-03 ENCOUNTER — DOCUMENTATION (OUTPATIENT)
Dept: ONCOLOGY | Facility: CLINIC | Age: 60
End: 2020-01-03

## 2020-01-03 NOTE — PROGRESS NOTES
I received patient's Colon Cancer Health Risk Assessment results. I sent patient an email and she called my office phone. Patient says that she had change in bowel habits for several weeks now with a history of chronic constipation. Patient says that she is having BM's 5 times/day and the stools are thin. Patient denies any blood in her stools. Patient says that she has taken Linzess before, but her insurance would not pay for that drug so BENNY Holcomb, put patient on Trulance. Patient says that the Trulance is too hard on her. Patient's last colonoscopy was with Dr. Bautista 3 years ago per patient with suggestions to get her next colonoscopy in 5 years. Patient asked that I get her an appointment to see a GI MD. I called Karla and she scheduled patient to see Dr. Anderson on 1/22/20 at 2:15pm. Karla also said that she could order a diagnostic colonoscopy due to change in bowel habits. I called patient and she said that she would like to take the 1/22/20 appointment. I encouraged patient to call for any worsening of symptoms and patient verbalized understanding. Patient says that she is moving and would rather wait for the appointment. Patient does have my contact number in case she has any other changes. AG

## 2020-05-27 ENCOUNTER — LAB (OUTPATIENT)
Dept: LAB | Facility: HOSPITAL | Age: 60
End: 2020-05-27

## 2020-05-27 ENCOUNTER — TRANSCRIBE ORDERS (OUTPATIENT)
Dept: LAB | Facility: HOSPITAL | Age: 60
End: 2020-05-27

## 2020-05-27 DIAGNOSIS — E06.5 CHRONIC FIBROUS THYROIDITIS: ICD-10-CM

## 2020-05-27 DIAGNOSIS — E06.5 CHRONIC FIBROUS THYROIDITIS: Primary | ICD-10-CM

## 2020-05-27 LAB — TSH SERPL DL<=0.05 MIU/L-ACNC: 3.08 UIU/ML (ref 0.27–4.2)

## 2020-05-27 PROCEDURE — 36415 COLL VENOUS BLD VENIPUNCTURE: CPT

## 2020-05-27 PROCEDURE — 84443 ASSAY THYROID STIM HORMONE: CPT

## 2020-06-19 DIAGNOSIS — F41.9 ANXIETY: ICD-10-CM

## 2020-06-19 DIAGNOSIS — G90.50 RSD (REFLEX SYMPATHETIC DYSTROPHY): ICD-10-CM

## 2020-06-22 RX ORDER — AMITRIPTYLINE HYDROCHLORIDE 10 MG/1
10 TABLET, FILM COATED ORAL 2 TIMES DAILY
Qty: 60 TABLET | Refills: 11 | Status: SHIPPED | OUTPATIENT
Start: 2020-06-22 | End: 2020-06-26 | Stop reason: SDUPTHER

## 2020-06-22 RX ORDER — DULOXETIN HYDROCHLORIDE 60 MG/1
60 CAPSULE, DELAYED RELEASE ORAL DAILY
Qty: 30 CAPSULE | Refills: 11 | Status: SHIPPED | OUTPATIENT
Start: 2020-06-22 | End: 2020-06-26 | Stop reason: SDUPTHER

## 2020-06-22 NOTE — TELEPHONE ENCOUNTER
Pharmacy requesting refills on Gabapentin and klonopin    Last OV was 12/20/19  Last refills on 06/24/19

## 2020-06-23 RX ORDER — CLONAZEPAM 0.5 MG/1
0.5 TABLET ORAL DAILY
Qty: 30 TABLET | Refills: 5 | OUTPATIENT
Start: 2020-06-23

## 2020-06-23 RX ORDER — GABAPENTIN 800 MG/1
800 TABLET ORAL 3 TIMES DAILY
Qty: 90 TABLET | Refills: 5 | OUTPATIENT
Start: 2020-06-23

## 2020-06-26 ENCOUNTER — OFFICE VISIT (OUTPATIENT)
Dept: FAMILY MEDICINE CLINIC | Facility: CLINIC | Age: 60
End: 2020-06-26

## 2020-06-26 VITALS
OXYGEN SATURATION: 100 % | TEMPERATURE: 97.5 F | RESPIRATION RATE: 14 BRPM | WEIGHT: 150.2 LBS | HEART RATE: 99 BPM | SYSTOLIC BLOOD PRESSURE: 110 MMHG | DIASTOLIC BLOOD PRESSURE: 58 MMHG | BODY MASS INDEX: 24.14 KG/M2 | HEIGHT: 66 IN

## 2020-06-26 DIAGNOSIS — M25.571 CHRONIC PAIN OF RIGHT ANKLE: Primary | ICD-10-CM

## 2020-06-26 DIAGNOSIS — G90.50 RSD (REFLEX SYMPATHETIC DYSTROPHY): ICD-10-CM

## 2020-06-26 DIAGNOSIS — G89.29 CHRONIC PAIN OF RIGHT ANKLE: Primary | ICD-10-CM

## 2020-06-26 DIAGNOSIS — F41.9 ANXIETY: ICD-10-CM

## 2020-06-26 PROCEDURE — 99213 OFFICE O/P EST LOW 20 MIN: CPT | Performed by: PHYSICIAN ASSISTANT

## 2020-06-26 RX ORDER — AMITRIPTYLINE HYDROCHLORIDE 10 MG/1
10 TABLET, FILM COATED ORAL 2 TIMES DAILY
Qty: 60 TABLET | Refills: 11 | Status: SHIPPED | OUTPATIENT
Start: 2020-06-26 | End: 2020-12-30 | Stop reason: SDUPTHER

## 2020-06-26 RX ORDER — DULOXETIN HYDROCHLORIDE 60 MG/1
60 CAPSULE, DELAYED RELEASE ORAL DAILY
Qty: 30 CAPSULE | Refills: 11 | Status: SHIPPED | OUTPATIENT
Start: 2020-06-26 | End: 2020-12-30 | Stop reason: SDUPTHER

## 2020-06-26 NOTE — PROGRESS NOTES
Subjective   Lisbeth Perdue is a 59 y.o. female  Anxiety (RF Klonopin); RSD (RF gabapentin 800mg TID); and Foot Pain (Rt foot x3 weeks. Ref to Dr. Irvin)      History of Present Illness  -year-old white female who comes in complaint chronic right ankle pain she does have a history of RSD she needs refill of her gabapentin and amitriptyline patient has seen Dr. Peraza in the past patient wants to see her back as pain with flexion extension of the right ankle has RSD symptoms that are not controlled.    Patient comes in for follow-up of anxiety needs refill Klonopin meds working well no problems or complaints  The following portions of the patient's history were reviewed and updated as appropriate: allergies, current medications, past social history and problem list    Review of Systems   Constitutional: Negative for appetite change, diaphoresis, fatigue and unexpected weight change.   Eyes: Negative for visual disturbance.   Respiratory: Negative for cough, chest tightness and shortness of breath.    Cardiovascular: Negative for chest pain, palpitations and leg swelling.   Gastrointestinal: Negative for diarrhea, nausea and vomiting.   Endocrine: Negative for polydipsia, polyphagia and polyuria.   Skin: Negative for color change and rash.   Neurological: Negative for dizziness, syncope, weakness, light-headedness, numbness and headaches.       Objective     Vitals:    06/26/20 1453   BP: 110/58   Pulse: 99   Resp: 14   Temp: 97.5 °F (36.4 °C)   SpO2: 100%       Physical Exam   Constitutional: She appears well-developed and well-nourished.   Neck: No JVD present.   Cardiovascular: Normal rate, regular rhythm, normal heart sounds, intact distal pulses and normal pulses.   No murmur heard.  Pulmonary/Chest: Effort normal and breath sounds normal. No respiratory distress.   Abdominal: Soft. Bowel sounds are normal. There is no hepatosplenomegaly. There is no tenderness.   Musculoskeletal: She exhibits no edema.         Right ankle: She exhibits decreased range of motion and swelling.   Skin: Skin is warm and dry.   Nursing note and vitals reviewed.      Assessment/Plan     Lisbeth was seen today for anxiety, rsd and foot pain.    Diagnoses and all orders for this visit:    Chronic pain of right ankle  -     Ambulatory Referral to Orthopedic Surgery    RSD (reflex sympathetic dystrophy)  -     amitriptyline (ELAVIL) 10 MG tablet; Take 1 tablet by mouth 2 (Two) Times a Day.  -     Ambulatory Referral to Orthopedic Surgery    Anxiety  -     DULoxetine (CYMBALTA) 60 MG capsule; Take 1 capsule by mouth Daily.    Klonopin 0.5 mg 1 p.o every day dispense 30 x5 refills    Also refill gabapentin 800 mg 3 times daily dispense 90×5 refills

## 2020-07-07 ENCOUNTER — OFFICE VISIT (OUTPATIENT)
Dept: FAMILY MEDICINE CLINIC | Facility: CLINIC | Age: 60
End: 2020-07-07

## 2020-07-07 VITALS
HEART RATE: 79 BPM | SYSTOLIC BLOOD PRESSURE: 108 MMHG | WEIGHT: 151.6 LBS | BODY MASS INDEX: 24.36 KG/M2 | DIASTOLIC BLOOD PRESSURE: 60 MMHG | OXYGEN SATURATION: 98 % | HEIGHT: 66 IN

## 2020-07-07 DIAGNOSIS — G90.50 RSD (REFLEX SYMPATHETIC DYSTROPHY): ICD-10-CM

## 2020-07-07 DIAGNOSIS — M85.88 OTHER SPECIFIED DISORDERS OF BONE DENSITY AND STRUCTURE, OTHER SITE: ICD-10-CM

## 2020-07-07 DIAGNOSIS — F41.1 GAD (GENERALIZED ANXIETY DISORDER): ICD-10-CM

## 2020-07-07 DIAGNOSIS — R07.2 PRECORDIAL PAIN: ICD-10-CM

## 2020-07-07 DIAGNOSIS — Z78.0 MENOPAUSE: ICD-10-CM

## 2020-07-07 DIAGNOSIS — H61.21 IMPACTED CERUMEN OF RIGHT EAR: ICD-10-CM

## 2020-07-07 DIAGNOSIS — Z00.00 WELL ADULT EXAM: Primary | ICD-10-CM

## 2020-07-07 DIAGNOSIS — E03.9 ACQUIRED HYPOTHYROIDISM: ICD-10-CM

## 2020-07-07 DIAGNOSIS — E55.9 VITAMIN D DEFICIENCY: ICD-10-CM

## 2020-07-07 DIAGNOSIS — Z13.0 SCREENING FOR DEFICIENCY ANEMIA: ICD-10-CM

## 2020-07-07 DIAGNOSIS — E78.00 PURE HYPERCHOLESTEROLEMIA: ICD-10-CM

## 2020-07-07 DIAGNOSIS — Z13.1 SCREENING FOR DIABETES MELLITUS: ICD-10-CM

## 2020-07-07 PROCEDURE — 99396 PREV VISIT EST AGE 40-64: CPT | Performed by: FAMILY MEDICINE

## 2020-07-07 PROCEDURE — 69209 REMOVE IMPACTED EAR WAX UNI: CPT | Performed by: FAMILY MEDICINE

## 2020-07-07 RX ORDER — ESTRADIOL 0.03 MG/D
1 FILM, EXTENDED RELEASE TRANSDERMAL 2 TIMES WEEKLY
Qty: 8 PATCH | Refills: 0 | Status: SHIPPED | OUTPATIENT
Start: 2020-07-09 | End: 2020-12-30

## 2020-07-07 NOTE — PATIENT INSTRUCTIONS
Recombinant Zoster (Shingles) Vaccine: What You Need to Know  1. Why get vaccinated?  Recombinant zoster (shingles) vaccine can prevent shingles.  Shingles (also called herpes zoster, or just zoster) is a painful skin rash, usually with blisters. In addition to the rash, shingles can cause fever, headache, chills, or upset stomach. More rarely, shingles can lead to pneumonia, hearing problems, blindness, brain inflammation (encephalitis), or death.  The most common complication of shingles is long-term nerve pain called postherpetic neuralgia (PHN). PHN occurs in the areas where the shingles rash was, even after the rash clears up. It can last for months or years after the rash goes away. The pain from PHN can be severe and debilitating.  About 10 to 18% of people who get shingles will experience PHN. The risk of PHN increases with age. An older adult with shingles is more likely to develop PHN and have longer lasting and more severe pain than a younger person with shingles.  Shingles is caused by the varicella zoster virus, the same virus that causes chickenpox. After you have chickenpox, the virus stays in your body and can cause shingles later in life. Shingles cannot be passed from one person to another, but the virus that causes shingles can spread and cause chickenpox in someone who had never had chickenpox or received chickenpox vaccine.  2. Recombinant shingles vaccine  Recombinant shingles vaccine provides strong protection against shingles. By preventing shingles, recombinant shingles vaccine also protects against PHN.  Recombinant shingles vaccine is the preferred vaccine for the prevention of shingles. However, a different vaccine, live shingles vaccine, may be used in some circumstances.  The recombinant shingles vaccine is recommended for adults 50 years and older without serious immune problems. It is given as a two-dose series.  This vaccine is also recommended for people who have already gotten  another type of shingles vaccine, the live shingles vaccine. There is no live virus in this vaccine.  Shingles vaccine may be given at the same time as other vaccines.  3. Talk with your health care provider  Tell your vaccine provider if the person getting the vaccine:  · Has had an allergic reaction after a previous dose of recombinant shingles vaccine, or has any severe, life-threatening allergies.  · Is pregnant or breastfeeding.  · Is currently experiencing an episode of shingles.  In some cases, your health care provider may decide to postpone shingles vaccination to a future visit.  People with minor illnesses, such as a cold, may be vaccinated. People who are moderately or severely ill should usually wait until they recover before getting recombinant shingles vaccine.  Your health care provider can give you more information.  4. Risks of a vaccine reaction  · A sore arm with mild or moderate pain is very common after recombinant shingles vaccine, affecting about 80% of vaccinated people. Redness and swelling can also happen at the site of the injection.  · Tiredness, muscle pain, headache, shivering, fever, stomach pain, and nausea happen after vaccination in more than half of people who receive recombinant shingles vaccine.  In clinical trials, about 1 out of 6 people who got recombinant zoster vaccine experienced side effects that prevented them from doing regular activities. Symptoms usually went away on their own in 2 to 3 days.  You should still get the second dose of recombinant zoster vaccine even if you had one of these reactions after the first dose.  People sometimes faint after medical procedures, including vaccination. Tell your provider if you feel dizzy or have vision changes or ringing in the ears.  As with any medicine, there is a very remote chance of a vaccine causing a severe allergic reaction, other serious injury, or death.  5. What if there is a serious problem?  An allergic reaction  could occur after the vaccinated person leaves the clinic. If you see signs of a severe allergic reaction (hives, swelling of the face and throat, difficulty breathing, a fast heartbeat, dizziness, or weakness), call 9-1-1 and get the person to the nearest hospital.  For other signs that concern you, call your health care provider.  Adverse reactions should be reported to the Vaccine Adverse Event Reporting System (VAERS). Your health care provider will usually file this report, or you can do it yourself. Visit the VAERS website at www.vaers.[facility].gov or call 1-770.255.8296. VAERS is only for reporting reactions, and VAERS staff do not give medical advice.  6. How can I learn more?  · Ask your health care provider.  · Call your local or state health department.  · Contact the Centers for Disease Control and Prevention (CDC):  ? Call 1-649.232.1529 (2-796-MQO-INFO) or  ? Visit CDC's website at www.cdc.gov/vaccines  Vaccine Information Statement Recombinant Zoster Vaccine (10/30/2019)  This information is not intended to replace advice given to you by your health care provider. Make sure you discuss any questions you have with your health care provider.  Document Released: 02/27/2018 Document Revised: 04/07/2020 Document Reviewed: 07/24/2019  Elsevier Patient Education © 2020 [employer]

## 2020-07-07 NOTE — PROGRESS NOTES
Lisbeth Perdue presents today to establish care.    Chief Complaint   Patient presents with   • Establish Care   • Annual Exam        HPI     She had bunionectomy in 2010 in right foot and resulted in RSV/ CRPS in her right foot. Her nerves go haywire. She worked with pain specialist with medications and physical therapy. She is able to walk on her foot with medication and pain is under control. Lost her balance while running and hit doorstop on baseboard with right foot 6/2/20 and still has pain in pinky. Can only wear flip flops. Has appt at end of the month with orthopedics.     Hypothyroidism for 10 years and also runs in the family.     She has mammograms at  and ovarian cancer screening. She sees GYN and on HRT.     She has had bone density and was told she had osteopenia. She's taking vitamin D.     Never had colon polyps, but father had colon polyps.     She saw ENT in the past and has small ear canals.       She reports in the past month or 2 that she had some chest discomfort substernal.  It lasted 15 to 20 minutes.  The episodes have occurred twice.  She is not having chest pain today.      Review of Systems   Constitutional: Positive for unexpected weight gain.   HENT: Positive for hearing loss.    Eyes: Negative.    Respiratory: Negative.    Cardiovascular: Positive for chest pain.   Gastrointestinal: Positive for constipation.   Endocrine: Negative.    Genitourinary: Negative.    Musculoskeletal: Positive for arthralgias.   Skin: Negative.    Neurological: Negative.    Hematological: Bruises/bleeds easily.        Past Medical History:   Diagnosis Date   • Anemia    • Burning mouth syndrome    • CRPS (complex regional pain syndrome)     lower limb   • Hyperlipidemia    • Hypothyroidism    • IBS (irritable bowel syndrome)    • Nevus, atypical    • Osteoarthrosis involving lower leg    • Osteopenia    • Pain in joint, lower leg    • RSD lower limb    • Shingles    • Vitamin D deficiency          Past Surgical History:   Procedure Laterality Date   • BUNIONECTOMY     • COLONOSCOPY  10/2016    DR GORE   • FOOT SURGERY Right    • FRACTURE SURGERY     • HYSTERECTOMY  2003   • TONSILLECTOMY          Family History   Problem Relation Age of Onset   • Hypertension Mother    • Hypothyroidism Mother    • Constipation Mother    • Depression Mother    • Osteoarthritis Mother    • Cancer Mother         squamous cell skin cancer   • Lymphoma Father         non-hodgkins   • Glaucoma Father    • Colon polyps Father    • Depression Father    • Kidney disease Father    • Nephrolithiasis Father    • Hypothyroidism Sister    • Depression Child    • Depression Maternal Grandmother    • Heart attack Maternal Grandfather 66   • Thyroid cancer Maternal Aunt    • Thyroid cancer Cousin    • Ovarian cancer Paternal Aunt         Social History     Socioeconomic History   • Marital status:      Spouse name: Not on file   • Number of children: Not on file   • Years of education: Not on file   • Highest education level: Not on file   Occupational History   • Occupation:    Tobacco Use   • Smoking status: Never Smoker   • Smokeless tobacco: Never Used   Substance and Sexual Activity   • Alcohol use: No   • Drug use: No   • Sexual activity: Defer        Current Outpatient Medications   Medication Sig Dispense Refill   • amitriptyline (ELAVIL) 10 MG tablet Take 1 tablet by mouth 2 (Two) Times a Day. 60 tablet 11   • ascorbic acid (VITAMIN C) 1000 MG tablet Take  by mouth daily.     • Cholecalciferol (VITAMIN D) 2000 UNITS tablet Take  by mouth.     • clonazePAM (KlonoPIN) 0.5 MG tablet Take 1 tablet by mouth Daily. 30 tablet 5   • DULoxetine (CYMBALTA) 60 MG capsule Take 1 capsule by mouth Daily. 30 capsule 11   • gabapentin (NEURONTIN) 800 MG tablet Take 1 tablet by mouth 3 (Three) Times a Day. 90 tablet 5   • levothyroxine (SYNTHROID, LEVOTHROID) 25 MCG tablet Take 1 tablet by mouth Daily.  1   • naproxen  "sodium (ALEVE) 220 MG tablet Take 220 mg by mouth As Needed.     • Omega-3 Fatty Acids (FISH OIL) 1000 MG capsule capsule Take  by mouth daily.     • Probiotic Product (PROBIOTIC DAILY PO) Take  by mouth.     • [START ON 7/9/2020] estradiol (Minivelle) 0.5 MG/24HR patch Place 1 patch on the skin as directed by provider 2 (Two) Times a Week. 8 patch 0     No current facility-administered medications for this visit.        Allergies   Allergen Reactions   • No Known Drug Allergy         Vitals:    07/07/20 0854   BP: 108/60   BP Location: Right arm   Patient Position: Sitting   Cuff Size: Adult   Pulse: 79   SpO2: 98%   Weight: 68.8 kg (151 lb 9.6 oz)   Height: 167.6 cm (66\")      Body mass index is 24.47 kg/m².    Physical Exam   Constitutional: She is oriented to person, place, and time. No distress.   HENT:   Right Ear: Tympanic membrane normal. cerumen impaction is present.  Left Ear: Tympanic membrane and ear canal normal.   Eyes: Conjunctivae are normal. Right eye exhibits no discharge. Left eye exhibits no discharge.   Neck: Neck supple. No thyromegaly present.   Cardiovascular: Normal rate and regular rhythm.   No murmur heard.  Pulmonary/Chest: Effort normal and breath sounds normal. Right breast exhibits no mass, no nipple discharge, no skin change and no tenderness. Left breast exhibits no mass, no nipple discharge, no skin change and no tenderness.   Abdominal: Soft. There is no hepatosplenomegaly. There is no tenderness.   Musculoskeletal: She exhibits no edema.   Lymphadenopathy:     She has no cervical adenopathy.     She has no axillary adenopathy.   Neurological: She is alert and oriented to person, place, and time.   Skin: Skin is warm and dry. No rash noted.   Psychiatric: She has a normal mood and affect. Her behavior is normal. Judgment and thought content normal.   Vitals reviewed.       Results for orders placed or performed in visit on 05/27/20   TSH   Result Value Ref Range    TSH 3.080 0.270 " - 4.200 uIU/mL      Immunization History   Administered Date(s) Administered   • FLUARIX/FLUZONE/AFLURIA/FLULAVAL QUAD 10/17/2017   • Tdap 09/08/2017   • flucelvax quad pfs =>4 YRS 10/08/2019     Ear Cerumen Removal  Date/Time: 7/7/2020 10:00 AM  Performed by: Payton Vasquez MD  Authorized by: Payton Vasquez MD   Location details: right ear  Patient tolerance: Patient tolerated the procedure well with no immediate complications  Comments: Successful removal  Procedure type: irrigation   Sedation:  Patient sedated: no              Lisbeth was seen today for establish care and annual exam.    Diagnoses and all orders for this visit:    Well adult exam  Return when fasting for labs.  Shingrix vaccine at local pharmacy, handout provided.  Obtain mammogram records.  No Pap needed status post hysterectomy.  Obtain colonoscopy records.  Other specified disorders of bone density and structure, other site  -     DEXA Bone Density Axial; Future  Reviewed last bone density from 2017 with osteopenia.  Due for follow-up testing.  Pure hypercholesterolemia  -     Lipid Panel; Future  Patient's last cholesterol in 2018 was elevated.  Recheck when fasting.  Screening for diabetes mellitus  -     Comprehensive Metabolic Panel; Future    Screening for deficiency anemia  -     CBC & Differential; Future    Vitamin D deficiency  -     Vitamin D 25 Hydroxy; Future  Patient has had low vitamin D.  Recheck levels.  Menopause  -     estradiol (Minivelle) 0.025 MG/24HR patch; Place 1 patch on the skin as directed by provider 2 (Two) Times a Week.  Discussed with patient over age 60 and greater than 5 years since menopause onset recommend discontinue hormone replacement therapy.  At this time taper to lower dose for 1 month and then stop.  Precordial pain  -     Ambulatory Referral to [facility] [place] - [place]  Recommend further evaluation of chest pain at the heart clinic given her age as well as  family history.  STEPH (generalized anxiety disorder)  Patient's last PCP prescribed klonopin and she didn't need a refill today.  Shiv report reviewed.  Discussed with the patient office policy to be seen every 3 months.  To schedule follow-up visit when she is due for a refill for an assessment.  Discussed she will need to sign a controlled substance agreement as well as our office does random urine drug screening.  RSD (reflex sympathetic dystrophy)  Patient's last PCP prescribed gabapentin and she didn't need a refill today. Shiv report reviewed.  Discussed with the patient office policy to be seen every 3 months.  To schedule follow-up visit when she is due for a refill for an assessment.  Discussed she will need to sign a controlled substance agreement as well as our office does random urine drug screening.  Acquired hypothyroidism  Endocrinology records requested.  Reviewed most recent thyroid function in normal range.  Impacted cerumen of right ear  -     Ear Cerumen Removal  Successful removal.    Counseled on health maintenance topics and preventative care recommendations: Breast cancer screening, cervical cancer screening, colon cancer screening, ovarian cancer screening, Shingrix immunization      Return in about 6 months (around 1/7/2021) for Office visit chronic pain.    Dr. Payton Schofield

## 2020-07-08 ENCOUNTER — LAB (OUTPATIENT)
Dept: LAB | Facility: HOSPITAL | Age: 60
End: 2020-07-08

## 2020-07-08 DIAGNOSIS — Z13.1 SCREENING FOR DIABETES MELLITUS: ICD-10-CM

## 2020-07-08 DIAGNOSIS — E78.00 PURE HYPERCHOLESTEROLEMIA: ICD-10-CM

## 2020-07-08 DIAGNOSIS — E55.9 VITAMIN D DEFICIENCY: ICD-10-CM

## 2020-07-08 DIAGNOSIS — Z13.0 SCREENING FOR DEFICIENCY ANEMIA: ICD-10-CM

## 2020-07-08 LAB
BASOPHILS # BLD AUTO: 0.09 10*3/MM3 (ref 0–0.2)
BASOPHILS NFR BLD AUTO: 1.9 % (ref 0–1.5)
DEPRECATED RDW RBC AUTO: 39.5 FL (ref 37–54)
EOSINOPHIL # BLD AUTO: 0.07 10*3/MM3 (ref 0–0.4)
EOSINOPHIL NFR BLD AUTO: 1.5 % (ref 0.3–6.2)
ERYTHROCYTE [DISTWIDTH] IN BLOOD BY AUTOMATED COUNT: 12.6 % (ref 12.3–15.4)
HCT VFR BLD AUTO: 36.9 % (ref 34–46.6)
HGB BLD-MCNC: 12.3 G/DL (ref 12–15.9)
IMM GRANULOCYTES # BLD AUTO: 0.01 10*3/MM3 (ref 0–0.05)
IMM GRANULOCYTES NFR BLD AUTO: 0.2 % (ref 0–0.5)
LYMPHOCYTES # BLD AUTO: 2.02 10*3/MM3 (ref 0.7–3.1)
LYMPHOCYTES NFR BLD AUTO: 43.3 % (ref 19.6–45.3)
MCH RBC QN AUTO: 28.9 PG (ref 26.6–33)
MCHC RBC AUTO-ENTMCNC: 33.3 G/DL (ref 31.5–35.7)
MCV RBC AUTO: 86.6 FL (ref 79–97)
MONOCYTES # BLD AUTO: 0.44 10*3/MM3 (ref 0.1–0.9)
MONOCYTES NFR BLD AUTO: 9.4 % (ref 5–12)
NEUTROPHILS NFR BLD AUTO: 2.03 10*3/MM3 (ref 1.7–7)
NEUTROPHILS NFR BLD AUTO: 43.7 % (ref 42.7–76)
NRBC BLD AUTO-RTO: 0 /100 WBC (ref 0–0.2)
PLATELET # BLD AUTO: 265 10*3/MM3 (ref 140–450)
PMV BLD AUTO: 11.4 FL (ref 6–12)
RBC # BLD AUTO: 4.26 10*6/MM3 (ref 3.77–5.28)
WBC # BLD AUTO: 4.66 10*3/MM3 (ref 3.4–10.8)

## 2020-07-08 PROCEDURE — 80053 COMPREHEN METABOLIC PANEL: CPT

## 2020-07-08 PROCEDURE — 80061 LIPID PANEL: CPT

## 2020-07-08 PROCEDURE — 82306 VITAMIN D 25 HYDROXY: CPT

## 2020-07-08 PROCEDURE — 85025 COMPLETE CBC W/AUTO DIFF WBC: CPT

## 2020-07-09 LAB
25(OH)D3 SERPL-MCNC: 40.8 NG/ML (ref 30–100)
ALBUMIN SERPL-MCNC: 4.2 G/DL (ref 3.5–5.2)
ALBUMIN/GLOB SERPL: 1.6 G/DL
ALP SERPL-CCNC: 34 U/L (ref 39–117)
ALT SERPL W P-5'-P-CCNC: 16 U/L (ref 1–33)
ANION GAP SERPL CALCULATED.3IONS-SCNC: 8 MMOL/L (ref 5–15)
AST SERPL-CCNC: 20 U/L (ref 1–32)
BILIRUB SERPL-MCNC: 0.4 MG/DL (ref 0–1.2)
BUN SERPL-MCNC: 11 MG/DL (ref 6–20)
BUN/CREAT SERPL: 13.3 (ref 7–25)
CALCIUM SPEC-SCNC: 9.8 MG/DL (ref 8.6–10.5)
CHLORIDE SERPL-SCNC: 103 MMOL/L (ref 98–107)
CHOLEST SERPL-MCNC: 250 MG/DL (ref 0–200)
CO2 SERPL-SCNC: 29 MMOL/L (ref 22–29)
CREAT SERPL-MCNC: 0.83 MG/DL (ref 0.57–1)
GFR SERPL CREATININE-BSD FRML MDRD: 70 ML/MIN/1.73
GLOBULIN UR ELPH-MCNC: 2.7 GM/DL
GLUCOSE SERPL-MCNC: 92 MG/DL (ref 65–99)
HDLC SERPL-MCNC: 96 MG/DL (ref 40–60)
LDLC SERPL CALC-MCNC: 139 MG/DL (ref 0–100)
LDLC/HDLC SERPL: 1.44 {RATIO}
POTASSIUM SERPL-SCNC: 5.3 MMOL/L (ref 3.5–5.2)
PROT SERPL-MCNC: 6.9 G/DL (ref 6–8.5)
SODIUM SERPL-SCNC: 140 MMOL/L (ref 136–145)
TRIGL SERPL-MCNC: 77 MG/DL (ref 0–150)
VLDLC SERPL-MCNC: 15.4 MG/DL (ref 5–40)

## 2020-07-14 ENCOUNTER — OFFICE VISIT (OUTPATIENT)
Dept: CARDIOLOGY | Facility: HOSPITAL | Age: 60
End: 2020-07-14

## 2020-07-14 ENCOUNTER — HOSPITAL ENCOUNTER (OUTPATIENT)
Dept: CARDIOLOGY | Facility: HOSPITAL | Age: 60
Discharge: HOME | End: 2020-07-14
Admitting: NURSE PRACTITIONER

## 2020-07-14 VITALS
DIASTOLIC BLOOD PRESSURE: 58 MMHG | OXYGEN SATURATION: 99 % | HEART RATE: 76 BPM | HEIGHT: 66 IN | SYSTOLIC BLOOD PRESSURE: 118 MMHG | BODY MASS INDEX: 23.97 KG/M2 | WEIGHT: 149.13 LBS | RESPIRATION RATE: 20 BRPM | TEMPERATURE: 97.1 F

## 2020-07-14 DIAGNOSIS — R07.2 PRECORDIAL PAIN: Primary | ICD-10-CM

## 2020-07-14 DIAGNOSIS — E78.2 MIXED HYPERLIPIDEMIA: ICD-10-CM

## 2020-07-14 DIAGNOSIS — R07.2 PRECORDIAL PAIN: ICD-10-CM

## 2020-07-14 PROCEDURE — 93005 ELECTROCARDIOGRAM TRACING: CPT | Performed by: NURSE PRACTITIONER

## 2020-07-14 PROCEDURE — 93010 ELECTROCARDIOGRAM REPORT: CPT | Performed by: INTERNAL MEDICINE

## 2020-07-14 PROCEDURE — 99213 OFFICE O/P EST LOW 20 MIN: CPT | Performed by: NURSE PRACTITIONER

## 2020-07-14 NOTE — PROGRESS NOTES
[facility]      07/14/2020         Lisbeth RODRIGUES [id number]  [unfilled]    1960    Payton Vasquez MD    Lisbeth Perdue is a 59 y.o. female.      Subjective:     Chief Complaint:  Chest Pain and Establish Care         HPI 59-year-old female presents to the office today at the request of Dr. Aurelio Schofield for ongoing evaluation of her chest discomfort.  She reports that she has experienced 2 episodes of chest pain over the last 1 to 2 months.  Pain is located in the center of her chest and she describes it as a burning sensation.  Both episodes lasted 15 to 20 minutes.  She was standing with both episodes and denies radiation.  Notes chest pain improved with rest.  She denies any associated dyspnea, dizziness, nausea or palpitations.  History of a stress test many years ago that was within normal limits.  Patient had been quite active until recently as she has dealing with an ongoing foot injury.  History of hyperlipidemia.  No family history of premature coronary artery disease.  Patient is a non-smoker  Cardiac risk factors:   dyslipidemia      Patient Active Problem List   Diagnosis   • Palpitations   • Irritable bowel syndrome with constipation   • RSD (reflex sympathetic dystrophy)   • Vitamin D deficiency   • Osteopenia   • Pure hypercholesterolemia   • STEPH (generalized anxiety disorder)   • Hypothyroidism       Past Medical History:   Diagnosis Date   • Anemia    • Burning mouth syndrome    • CRPS (complex regional pain syndrome)     lower limb   • Hyperlipidemia    • Hypothyroidism    • IBS (irritable bowel syndrome)    • Nevus, atypical    • Osteoarthrosis involving lower leg    • Osteopenia    • Pain in joint, lower leg    • RSD lower limb    • Shingles    • Vitamin D deficiency        Past Surgical History:   Procedure Laterality Date   • BUNIONECTOMY     • COLONOSCOPY  10/2016    DR GORE   • FOOT  SURGERY Right    • FRACTURE SURGERY     • HYSTERECTOMY  2003   • TONSILLECTOMY         Family History   Problem Relation Age of Onset   • Hypertension Mother    • Hypothyroidism Mother    • Constipation Mother    • Depression Mother    • Osteoarthritis Mother    • Cancer Mother         squamous cell skin cancer   • Lymphoma Father         non-hodgkins   • Glaucoma Father    • Colon polyps Father    • Depression Father    • Kidney disease Father    • Nephrolithiasis Father    • Hypothyroidism Sister    • Depression Child    • Depression Maternal Grandmother    • Heart attack Maternal Grandfather 66   • Thyroid cancer Maternal Aunt    • Thyroid cancer Cousin    • Ovarian cancer Paternal Aunt    • No Known Problems Paternal Grandmother    • No Known Problems Paternal Grandfather        Social History     Socioeconomic History   • Marital status:      Spouse name: Not on file   • Number of children: Not on file   • Years of education: Not on file   • Highest education level: Not on file   Occupational History   • Occupation:    Tobacco Use   • Smoking status: Never Smoker   • Smokeless tobacco: Never Used   Substance and Sexual Activity   • Alcohol use: No   • Drug use: No   • Sexual activity: Defer   Social History Narrative    Caffeine? 5 cups coffee daily       Allergies   Allergen Reactions   • No Known Drug Allergy          Current Outpatient Medications:   •  amitriptyline (ELAVIL) 10 MG tablet, Take 1 tablet by mouth 2 (Two) Times a Day., Disp: 60 tablet, Rfl: 11  •  ascorbic acid (VITAMIN C) 1000 MG tablet, Take  by mouth daily., Disp: , Rfl:   •  Cholecalciferol (VITAMIN D) 2000 UNITS tablet, Take  by mouth., Disp: , Rfl:   •  clonazePAM (KlonoPIN) 0.5 MG tablet, Take 1 tablet by mouth Daily., Disp: 30 tablet, Rfl: 5  •  DULoxetine (CYMBALTA) 60 MG capsule, Take 1 capsule by mouth Daily., Disp: 30 capsule, Rfl: 11  •  estradiol (Minivelle) 0.025 MG/24HR patch, Place 1 patch on the  skin as directed by provider 2 (Two) Times a Week., Disp: 8 patch, Rfl: 0  •  gabapentin (NEURONTIN) 800 MG tablet, Take 1 tablet by mouth 3 (Three) Times a Day., Disp: 90 tablet, Rfl: 5  •  levothyroxine (SYNTHROID, LEVOTHROID) 25 MCG tablet, Take 1 tablet by mouth Daily., Disp: , Rfl: 1  •  naproxen sodium (ALEVE) 220 MG tablet, Take 220 mg by mouth As Needed., Disp: , Rfl:   •  Omega-3 Fatty Acids (FISH OIL) 1000 MG capsule capsule, Take  by mouth daily., Disp: , Rfl:   •  Probiotic Product (PROBIOTIC DAILY PO), Take  by mouth., Disp: , Rfl:     The following portions of the patient's history were reviewed today and updated as appropriate: allergies, current medications, past family history, past medical history, past social history, past surgical history and problem list     Review of Systems   Constitution: Negative for chills, decreased appetite, diaphoresis, fever, malaise/fatigue, night sweats, weight gain and weight loss.   HENT: Negative for congestion, hearing loss, hoarse voice and nosebleeds.    Eyes: Negative for blurred vision, visual disturbance and visual halos.   Cardiovascular: Positive for chest pain. Negative for claudication, cyanosis, dyspnea on exertion, irregular heartbeat, leg swelling, near-syncope, orthopnea, palpitations, paroxysmal nocturnal dyspnea and syncope.   Respiratory: Negative for cough, hemoptysis, shortness of breath, sleep disturbances due to breathing, snoring, sputum production and wheezing.    Hematologic/Lymphatic: Negative for bleeding problem. Does not bruise/bleed easily.   Skin: Negative for dry skin, itching and rash.   Musculoskeletal: Positive for joint pain (right ankle pain ). Negative for arthritis, falls, joint swelling and myalgias.   Gastrointestinal: Negative for bloating, abdominal pain, constipation, diarrhea, flatus, heartburn, hematemesis, hematochezia, melena, nausea and vomiting.   Genitourinary: Negative for dysuria, frequency, hematuria, nocturia  "and urgency.   Neurological: Negative for excessive daytime sleepiness, dizziness, headaches, light-headedness, loss of balance and weakness.   Psychiatric/Behavioral: Negative for depression. The patient does not have insomnia and is not nervous/anxious.        Objective:     Vitals:    07/14/20 0848 07/14/20 0849 07/14/20 0850   BP: 114/58 124/62 118/58   BP Location: Left arm Left arm Right arm   Patient Position: Standing Sitting Sitting   Cuff Size: Adult Adult Adult   Pulse: 89 79 76   Resp:   20   Temp:   97.1 °F (36.2 °C)   TempSrc:   Temporal   SpO2: 98% 99% 99%   Weight:   67.6 kg (149 lb 2 oz)   Height:   167.6 cm (66\")       Body mass index is 24.07 kg/m².    Physical Exam   Constitutional: She is oriented to person, place, and time. She appears well-developed and well-nourished. She is active and cooperative. No distress.   HENT:   Head: Normocephalic and atraumatic.   Mouth/Throat: Oropharynx is clear and moist.   Eyes: Pupils are equal, round, and reactive to light. Conjunctivae and EOM are normal.   Neck: Normal range of motion. Neck supple. No JVD present. No tracheal deviation present. No thyromegaly present.   Cardiovascular: Normal rate, regular rhythm, normal heart sounds and intact distal pulses.   Pulmonary/Chest: Effort normal and breath sounds normal.   Abdominal: Soft. Bowel sounds are normal. She exhibits no distension. There is no tenderness.   Musculoskeletal: Normal range of motion.   Neurological: She is alert and oriented to person, place, and time.   Skin: Skin is warm, dry and intact.   Psychiatric: She has a normal mood and affect. Her behavior is normal.   Nursing note and vitals reviewed.      Lab and Diagnostic Review:  Results for orders placed or performed in visit on 07/08/20   Lipid Panel   Result Value Ref Range    Total Cholesterol 250 (H) 0 - 200 mg/dL    Triglycerides 77 0 - 150 mg/dL    HDL Cholesterol 96 (H) 40 - 60 mg/dL    LDL Cholesterol  139 (H) 0 - 100 mg/dL    " VLDL Cholesterol 15.4 5 - 40 mg/dL    LDL/HDL Ratio 1.44    Comprehensive Metabolic Panel   Result Value Ref Range    Glucose 92 65 - 99 mg/dL    BUN 11 6 - 20 mg/dL    Creatinine 0.83 0.57 - 1.00 mg/dL    Sodium 140 136 - 145 mmol/L    Potassium 5.3 (H) 3.5 - 5.2 mmol/L    Chloride 103 98 - 107 mmol/L    CO2 29.0 22.0 - 29.0 mmol/L    Calcium 9.8 8.6 - 10.5 mg/dL    Total Protein 6.9 6.0 - 8.5 g/dL    Albumin 4.20 3.50 - 5.20 g/dL    ALT (SGPT) 16 1 - 33 U/L    AST (SGOT) 20 1 - 32 U/L    Alkaline Phosphatase 34 (L) 39 - 117 U/L    Total Bilirubin 0.4 0.0 - 1.2 mg/dL    eGFR Non African Amer 70 >60 mL/min/1.73    Globulin 2.7 gm/dL    A/G Ratio 1.6 g/dL    BUN/Creatinine Ratio 13.3 7.0 - 25.0    Anion Gap 8.0 5.0 - 15.0 mmol/L   Vitamin D 25 Hydroxy   Result Value Ref Range    25 Hydroxy, Vitamin D 40.8 30.0 - 100.0 ng/ml   CBC Auto Differential   Result Value Ref Range    WBC 4.66 3.40 - 10.80 10*3/mm3    RBC 4.26 3.77 - 5.28 10*6/mm3    Hemoglobin 12.3 12.0 - 15.9 g/dL    Hematocrit 36.9 34.0 - 46.6 %    MCV 86.6 79.0 - 97.0 fL    MCH 28.9 26.6 - 33.0 pg    MCHC 33.3 31.5 - 35.7 g/dL    RDW 12.6 12.3 - 15.4 %    RDW-SD 39.5 37.0 - 54.0 fl    MPV 11.4 6.0 - 12.0 fL    Platelets 265 140 - 450 10*3/mm3    Neutrophil % 43.7 42.7 - 76.0 %    Lymphocyte % 43.3 19.6 - 45.3 %    Monocyte % 9.4 5.0 - 12.0 %    Eosinophil % 1.5 0.3 - 6.2 %    Basophil % 1.9 (H) 0.0 - 1.5 %    Immature Grans % 0.2 0.0 - 0.5 %    Neutrophils, Absolute 2.03 1.70 - 7.00 10*3/mm3    Lymphocytes, Absolute 2.02 0.70 - 3.10 10*3/mm3    Monocytes, Absolute 0.44 0.10 - 0.90 10*3/mm3    Eosinophils, Absolute 0.07 0.00 - 0.40 10*3/mm3    Basophils, Absolute 0.09 0.00 - 0.20 10*3/mm3    Immature Grans, Absolute 0.01 0.00 - 0.05 10*3/mm3    nRBC 0.0 0.0 - 0.2 /100 WBC     EKG: Normal sinus rhythm at 71 bpm    Assessment and Plan:   1. Precordial pain  debi score: 0  - ECG 12 Lead; Future  - Stress Test With Myocardial Perfusion (1 Day); Future    2.  Mixed hyperlipidemia    - Stress Test With Myocardial Perfusion (1 Day); Future    The 10-year ASCVD risk score (Austinbob COLE Jr., et al., 2013) is: 2%    Values used to calculate the score:      Age: 59 years      Sex: Female      Is Non- : No      Diabetic: No      Tobacco smoker: No      Systolic Blood Pressure: 118 mmHg      Is BP treated: No      HDL Cholesterol: 96 mg/dL      Total Cholesterol: 250 mg/dL      It has been a pleasure to participate in the care of this patient.  Patient was instructed to call the [facility] with any questions, concerns, or worsening symptoms.    *Please note that portions of this note were completed with a voice recognition program. Efforts were made to edit the dictations, but occasionally words are mistranscribed.      Answers for HPI/ROS submitted by the patient on 7/9/2020   What is the primary reason for your visit?: Other  Please describe your symptoms.: Chest pain lasting 15-20 minutes on two separate occasions about a month ago.  Have you had these symptoms before?: No  How long have you been having these symptoms?: Greater than 2 weeks  Please list any medications you are currently taking for this condition.: None  Please describe any probable cause for these symptoms. : None

## 2020-07-24 ENCOUNTER — OFFICE VISIT (OUTPATIENT)
Dept: ORTHOPEDIC SURGERY | Facility: CLINIC | Age: 60
End: 2020-07-24

## 2020-07-24 VITALS — OXYGEN SATURATION: 98 % | HEIGHT: 66 IN | WEIGHT: 149.03 LBS | HEART RATE: 87 BPM | BODY MASS INDEX: 23.95 KG/M2

## 2020-07-24 DIAGNOSIS — S92.514A NONDISPLACED FRACTURE OF PROXIMAL PHALANX OF RIGHT LESSER TOE(S), INITIAL ENCOUNTER FOR CLOSED FRACTURE: ICD-10-CM

## 2020-07-24 DIAGNOSIS — M79.674 PAIN IN RIGHT TOE(S): Primary | ICD-10-CM

## 2020-07-24 PROCEDURE — 99213 OFFICE O/P EST LOW 20 MIN: CPT | Performed by: PHYSICIAN ASSISTANT

## 2020-07-24 NOTE — PROGRESS NOTES
Answers for HPI/ROS submitted by the patient on 2020   What is the primary reason for your visit?: Other  Please describe your symptoms.: I fell and hit my toes on a doorstop. The injury is to my right foot which has RSD. I am unable to wear anything but flip flops as my smallest toes can it tolerate any pressure. I also cannot stress my right foot very much before the toe and my door begins hurting. I want to be able to wear regular shoes and resume normal activity.  Have you had these symptoms before?: Yes  How long have you been having these symptoms?: Greater than 2 weeks          [facility] Note    Subjective     Patient: Lisbeth Perdue  : 1960    Primary Care Provider: Payton Vasquez MD    Requesting Provider: As above    Pain of the Right Foot (Kicked a door stop DOI: 20)      History    Chief Complaint: Right 5th toe pain    History of Present Illness: Patient returns today with a new problem.  She complains of right small toe pain, swelling for 5 weeks since hitting it against a doorstop.  She continues to have pain.  She is concerned given that she has RSD in that foot and wants to make sure there is nothing else that needs to be done.   She has had a small increase in her RSD symptoms but they remain well controlled.  She is wearing flip flops for comfort.  It has improved some over the past 5 weeks.    Current Outpatient Medications on File Prior to Visit   Medication Sig Dispense Refill   • amitriptyline (ELAVIL) 10 MG tablet Take 1 tablet by mouth 2 (Two) Times a Day. 60 tablet 11   • ascorbic acid (VITAMIN C) 1000 MG tablet Take  by mouth daily.     • Cholecalciferol (VITAMIN D) 2000 UNITS tablet Take  by mouth.     • clonazePAM (KlonoPIN) 0.5 MG tablet Take 1 tablet by mouth Daily. 30 tablet 5   • DULoxetine (CYMBALTA) 60 MG capsule Take 1 capsule by mouth Daily. 30 capsule 11   • estradiol (Minivelle) 0.025 MG/24HR patch Place 1 patch on  the skin as directed by provider 2 (Two) Times a Week. 8 patch 0   • gabapentin (NEURONTIN) 800 MG tablet Take 1 tablet by mouth 3 (Three) Times a Day. 90 tablet 5   • levothyroxine (SYNTHROID, LEVOTHROID) 25 MCG tablet Take 1 tablet by mouth Daily.  1   • naproxen sodium (ALEVE) 220 MG tablet Take 220 mg by mouth As Needed.     • Omega-3 Fatty Acids (FISH OIL) 1000 MG capsule capsule Take  by mouth daily.     • Probiotic Product (PROBIOTIC DAILY PO) Take  by mouth.       No current facility-administered medications on file prior to visit.       Allergies   Allergen Reactions   • No Known Drug Allergy       Past Medical History:   Diagnosis Date   • Anemia    • Burning mouth syndrome    • CRPS (complex regional pain syndrome)     lower limb   • Hyperlipidemia    • Hypothyroidism    • IBS (irritable bowel syndrome)    • Nevus, atypical    • Osteoarthrosis involving lower leg    • Osteopenia    • Pain in joint, lower leg    • RSD lower limb    • Shingles    • Vitamin D deficiency      Past Surgical History:   Procedure Laterality Date   • BUNIONECTOMY     • COLONOSCOPY  10/2016    DR GORE   • FOOT SURGERY Right    • FRACTURE SURGERY     • HYSTERECTOMY  2003   • TONSILLECTOMY       Family History   Problem Relation Age of Onset   • Hypertension Mother    • Hypothyroidism Mother    • Constipation Mother    • Depression Mother    • Osteoarthritis Mother    • Cancer Mother         squamous cell skin cancer   • Lymphoma Father         non-hodgkins   • Glaucoma Father    • Colon polyps Father    • Depression Father    • Kidney disease Father    • Nephrolithiasis Father    • Hypothyroidism Sister    • Depression Child    • Depression Maternal Grandmother    • Heart attack Maternal Grandfather 66   • Thyroid cancer Maternal Aunt    • Thyroid cancer Cousin    • Ovarian cancer Paternal Aunt    • No Known Problems Paternal Grandmother    • No Known Problems Paternal Grandfather       Social History     Socioeconomic History   •  "Marital status:      Spouse name: Not on file   • Number of children: Not on file   • Years of education: Not on file   • Highest education level: Not on file   Occupational History   • Occupation:    Tobacco Use   • Smoking status: Never Smoker   • Smokeless tobacco: Never Used   Substance and Sexual Activity   • Alcohol use: No   • Drug use: No   • Sexual activity: Defer   Social History Narrative    Caffeine? 5 cups coffee daily        Review of Systems   Constitutional: Positive for activity change.   HENT: Negative.    Eyes: Negative.    Respiratory: Negative.    Cardiovascular: Negative.    Gastrointestinal: Positive for constipation.   Endocrine: Negative.    Genitourinary: Negative.    Musculoskeletal: Positive for arthralgias.        RSD Right foot   Skin: Negative.    Allergic/Immunologic: Negative.    Neurological: Negative.    Hematological: Negative.    Psychiatric/Behavioral: Negative.        The following portions of the patient's history were reviewed and updated as appropriate: allergies, current medications, past family history, past medical history, past social history, past surgical history and problem list.      Objective      Physical Exam  Pulse 87   Ht 167.6 cm (65.98\")   Wt 67.6 kg (149 lb 0.5 oz)   SpO2 98%   BMI 24.07 kg/m²     Body mass index is 24.07 kg/m².    Patient is well-developed, well-nourished, in no acute distress.  Alert and oriented x 3.     Ortho Exam  V:  Dorsalis Pedis:  Right: 2+;     Posterior Tibial: Right:2+;     Capillary Refill:  Brisk  MSK:      Tibia:  Right:  non tender;    Ankle:  Right: non tender, ROM  normal and motor function  normal;     Foot:  Right:  tender over the small toe with swelling and mild erythema.;       NEURO:     King[place]-Truong 5.07 monofilament test: not evaluated    Lower extremity sensation: intact     Calf Atrophy:none    Motor Function: all 5/5      ]        Medical Decision Making    Data Review:   ordered " and reviewed x-rays today    Assessment:  1. Pain in right toe(s)    2. Nondisplaced fracture of proximal phalanx of right lesser toe(s), initial encounter for closed fracture        Plan:  Right small toe fracture.  I reviewed today's x-rays and clinical findings with the patient.  She is tender over the toe with swelling and some mild erythema.  X-rays today show healing nondisplaced fracture of the proximal phalanx of the fifth toe, transverse at the mid third distal third junction.  Prior healed first MTPJ osteotomy, good alignment, no comparison.  I reassured the patient that there is nothing that needs to be done for the broken toe.  It will be sore and swollen for months.  She will continue to wear flip flops for comfort and return to see us as needed    Patient history, diagnosis and treatment plan discussed with Dr. Irvin.      Jeri Curiel PA-C  07/28/20  08:41

## 2020-08-07 ENCOUNTER — APPOINTMENT (OUTPATIENT)
Dept: CARDIOLOGY | Facility: HOSPITAL | Age: 60
End: 2020-08-07

## 2020-09-17 DIAGNOSIS — E78.2 MIXED HYPERLIPIDEMIA: ICD-10-CM

## 2020-09-17 DIAGNOSIS — R07.2 PRECORDIAL PAIN: Primary | ICD-10-CM

## 2020-09-22 DIAGNOSIS — E78.2 MIXED HYPERLIPIDEMIA: ICD-10-CM

## 2020-09-22 DIAGNOSIS — R07.2 PRECORDIAL PAIN: Primary | ICD-10-CM

## 2020-10-05 ENCOUNTER — APPOINTMENT (OUTPATIENT)
Dept: PREADMISSION TESTING | Facility: HOSPITAL | Age: 60
End: 2020-10-05

## 2020-10-05 PROCEDURE — C9803 HOPD COVID-19 SPEC COLLECT: HCPCS

## 2020-10-05 PROCEDURE — U0004 COV-19 TEST NON-CDC HGH THRU: HCPCS

## 2020-10-06 LAB — SARS-COV-2 RNA RESP QL NAA+PROBE: NOT DETECTED

## 2020-10-08 ENCOUNTER — HOSPITAL ENCOUNTER (OUTPATIENT)
Dept: CARDIOLOGY | Facility: HOSPITAL | Age: 60
Discharge: HOME | End: 2020-10-08
Admitting: NURSE PRACTITIONER

## 2020-10-08 DIAGNOSIS — R07.2 PRECORDIAL PAIN: ICD-10-CM

## 2020-10-08 DIAGNOSIS — E78.2 MIXED HYPERLIPIDEMIA: ICD-10-CM

## 2020-10-08 LAB
BH CV STRESS BP STAGE 1: NORMAL
BH CV STRESS BP STAGE 2: NORMAL
BH CV STRESS DURATION MIN STAGE 1: 3
BH CV STRESS DURATION MIN STAGE 2: 3
BH CV STRESS DURATION MIN STAGE 3: 2
BH CV STRESS DURATION SEC STAGE 1: 0
BH CV STRESS DURATION SEC STAGE 2: 0
BH CV STRESS DURATION SEC STAGE 3: 0
BH CV STRESS GRADE STAGE 1: 10
BH CV STRESS GRADE STAGE 2: 12
BH CV STRESS GRADE STAGE 3: 14
BH CV STRESS HR STAGE 1: 118
BH CV STRESS HR STAGE 2: 153
BH CV STRESS HR STAGE 3: 157
BH CV STRESS METS STAGE 1: 5
BH CV STRESS METS STAGE 2: 7.5
BH CV STRESS METS STAGE 3: 8.6
BH CV STRESS PROTOCOL 1: NORMAL
BH CV STRESS RECOVERY BP: NORMAL MMHG
BH CV STRESS RECOVERY HR: 104 BPM
BH CV STRESS SPEED STAGE 1: 1.7
BH CV STRESS SPEED STAGE 2: 2.5
BH CV STRESS SPEED STAGE 3: 3.4
BH CV STRESS STAGE 1: 1
BH CV STRESS STAGE 2: 2
BH CV STRESS STAGE 3: 3
LV EF NUC BP: 73 %
MAXIMAL PREDICTED HEART RATE: 160 BPM
PERCENT MAX PREDICTED HR: 103.75 %
STRESS BASELINE BP: NORMAL MMHG
STRESS BASELINE HR: 75 BPM
STRESS PERCENT HR: 122 %
STRESS POST ESTIMATED WORKLOAD: 8.6 METS
STRESS POST EXERCISE DUR MIN: 8 MIN
STRESS POST EXERCISE DUR SEC: 0 SEC
STRESS POST PEAK BP: NORMAL MMHG
STRESS POST PEAK HR: 166 BPM
STRESS TARGET HR: 136 BPM

## 2020-10-08 PROCEDURE — 93017 CV STRESS TEST TRACING ONLY: CPT

## 2020-10-08 PROCEDURE — 93018 CV STRESS TEST I&R ONLY: CPT | Performed by: INTERNAL MEDICINE

## 2020-10-08 PROCEDURE — 78452 HT MUSCLE IMAGE SPECT MULT: CPT

## 2020-10-08 PROCEDURE — A9500 TC99M SESTAMIBI: HCPCS | Performed by: NURSE PRACTITIONER

## 2020-10-08 PROCEDURE — 0 TECHNETIUM SESTAMIBI: Performed by: NURSE PRACTITIONER

## 2020-10-08 PROCEDURE — 78452 HT MUSCLE IMAGE SPECT MULT: CPT | Performed by: INTERNAL MEDICINE

## 2020-10-08 RX ADMIN — TECHNETIUM TC 99M SESTAMIBI 1 DOSE: 1 INJECTION INTRAVENOUS at 12:15

## 2020-10-08 RX ADMIN — TECHNETIUM TC 99M SESTAMIBI 1 DOSE: 1 INJECTION INTRAVENOUS at 10:40

## 2020-10-09 ENCOUNTER — TELEPHONE (OUTPATIENT)
Dept: CARDIOLOGY | Facility: HOSPITAL | Age: 60
End: 2020-10-09

## 2020-12-07 ENCOUNTER — HOSPITAL ENCOUNTER (OUTPATIENT)
Dept: BONE DENSITY | Facility: HOSPITAL | Age: 60
Discharge: HOME | End: 2020-12-07
Admitting: FAMILY MEDICINE

## 2020-12-07 DIAGNOSIS — M85.88 OTHER SPECIFIED DISORDERS OF BONE DENSITY AND STRUCTURE, OTHER SITE: ICD-10-CM

## 2020-12-07 PROCEDURE — 77080 DXA BONE DENSITY AXIAL: CPT

## 2020-12-30 ENCOUNTER — OFFICE VISIT (OUTPATIENT)
Dept: FAMILY MEDICINE CLINIC | Facility: CLINIC | Age: 60
End: 2020-12-30

## 2020-12-30 VITALS
BODY MASS INDEX: 25.94 KG/M2 | SYSTOLIC BLOOD PRESSURE: 108 MMHG | DIASTOLIC BLOOD PRESSURE: 60 MMHG | HEIGHT: 66 IN | WEIGHT: 161.4 LBS | HEART RATE: 82 BPM | OXYGEN SATURATION: 98 %

## 2020-12-30 DIAGNOSIS — K14.6 BURNING MOUTH SYNDROME: ICD-10-CM

## 2020-12-30 DIAGNOSIS — Z51.81 THERAPEUTIC DRUG MONITORING: ICD-10-CM

## 2020-12-30 DIAGNOSIS — F41.1 GAD (GENERALIZED ANXIETY DISORDER): Chronic | ICD-10-CM

## 2020-12-30 DIAGNOSIS — G90.50 RSD (REFLEX SYMPATHETIC DYSTROPHY): ICD-10-CM

## 2020-12-30 DIAGNOSIS — Z79.899 CONTROLLED SUBSTANCE AGREEMENT SIGNED: ICD-10-CM

## 2020-12-30 DIAGNOSIS — E03.9 ACQUIRED HYPOTHYROIDISM: ICD-10-CM

## 2020-12-30 DIAGNOSIS — M85.852 OSTEOPENIA OF NECK OF LEFT FEMUR: Primary | ICD-10-CM

## 2020-12-30 PROCEDURE — 99214 OFFICE O/P EST MOD 30 MIN: CPT | Performed by: FAMILY MEDICINE

## 2020-12-30 RX ORDER — NICOTINE POLACRILEX 4 MG/1
GUM, CHEWING ORAL
COMMUNITY
Start: 2020-09-01

## 2020-12-30 RX ORDER — CLONAZEPAM 0.5 MG/1
0.5 TABLET ORAL DAILY
Qty: 30 TABLET | Refills: 2 | Status: SHIPPED | OUTPATIENT
Start: 2020-12-30 | End: 2021-03-30 | Stop reason: SDUPTHER

## 2020-12-30 RX ORDER — AMITRIPTYLINE HYDROCHLORIDE 10 MG/1
10 TABLET, FILM COATED ORAL 2 TIMES DAILY
Qty: 60 TABLET | Refills: 2 | Status: SHIPPED | OUTPATIENT
Start: 2020-12-30 | End: 2021-03-22

## 2020-12-30 RX ORDER — DULOXETIN HYDROCHLORIDE 60 MG/1
60 CAPSULE, DELAYED RELEASE ORAL DAILY
Qty: 30 CAPSULE | Refills: 5 | Status: SHIPPED | OUTPATIENT
Start: 2020-12-30 | End: 2021-06-21

## 2020-12-30 RX ORDER — GABAPENTIN 800 MG/1
800 TABLET ORAL 3 TIMES DAILY
Qty: 90 TABLET | Refills: 2 | Status: SHIPPED | OUTPATIENT
Start: 2020-12-30 | End: 2021-03-30 | Stop reason: SDUPTHER

## 2020-12-30 NOTE — PROGRESS NOTES
Chief Complaint   Patient presents with   • Pain   • Anxiety        HPI     Using vitamin D daily. No exercise. She has goal to start walking. Retire in March. Wants to start eating better.     She is seeing endocrinologist for thyroid.     Ears are constantly itching or not hearing as well. She has small ear canals and ear wax build up.     Chronic pain is doing good with current medications. She saw pain management in the past. RSD right foot and burning mouth.     With COVID anxiety increased but able to manage with medication. Increased work.           Review of Systems   HENT: Positive for hearing loss.    Musculoskeletal: Positive for arthralgias.   Psychiatric/Behavioral: The patient is nervous/anxious.         Patient Active Problem List   Diagnosis   • Palpitations   • Irritable bowel syndrome with constipation   • RSD (reflex sympathetic dystrophy)   • Vitamin D deficiency   • Osteopenia   • Pure hypercholesterolemia   • STEPH (generalized anxiety disorder)   • Hypothyroidism   • Burning mouth syndrome   • Controlled substance agreement signed       Current Outpatient Medications   Medication Sig Dispense Refill   • amitriptyline (ELAVIL) 10 MG tablet Take 1 tablet by mouth 2 (Two) Times a Day. 60 tablet 11   • ascorbic acid (VITAMIN C) 1000 MG tablet Take  by mouth daily.     • Cholecalciferol (VITAMIN D) 2000 UNITS tablet Take  by mouth.     • clonazePAM (KlonoPIN) 0.5 MG tablet Take 1 tablet by mouth Daily. 30 tablet 5   • DULoxetine (CYMBALTA) 60 MG capsule Take 1 capsule by mouth Daily. 30 capsule 11   • gabapentin (NEURONTIN) 800 MG tablet Take 1 tablet by mouth 3 (Three) Times a Day. 90 tablet 5   • levothyroxine (SYNTHROID, LEVOTHROID) 25 MCG tablet Take 1 tablet by mouth Daily.  1   • naproxen sodium (ALEVE) 220 MG tablet Take 220 mg by mouth As Needed.     • Omega-3 Fatty Acids (FISH OIL) 1000 MG capsule capsule Take  by mouth daily.     • Omeprazole 20 MG tablet delayed-release      • Probiotic  Product (PROBIOTIC DAILY PO) Take  by mouth.       No current facility-administered medications for this visit.        Allergies   Allergen Reactions   • No Known Drug Allergy        Past Medical History:   Diagnosis Date   • Anemia    • Burning mouth syndrome    • CRPS (complex regional pain syndrome)     lower limb   • Hyperlipidemia    • Hypothyroidism    • IBS (irritable bowel syndrome)    • Nevus, atypical    • Osteoarthrosis involving lower leg    • Osteopenia    • Pain in joint, lower leg    • RSD lower limb    • Shingles    • Vitamin D deficiency         Past Surgical History:   Procedure Laterality Date   • BUNIONECTOMY     • COLONOSCOPY  10/2016    DR GORE   • FOOT SURGERY Right    • FRACTURE SURGERY     • HYSTERECTOMY  2003   • TONSILLECTOMY          Family History   Problem Relation Age of Onset   • Hypertension Mother    • Hypothyroidism Mother    • Constipation Mother    • Depression Mother    • Osteoarthritis Mother    • Cancer Mother         squamous cell skin cancer   • Lymphoma Father         non-hodgkins   • Glaucoma Father    • Colon polyps Father    • Depression Father    • Kidney disease Father    • Nephrolithiasis Father    • Hypothyroidism Sister    • Depression Child    • Depression Maternal Grandmother    • Heart attack Maternal Grandfather 66   • Thyroid cancer Maternal Aunt    • Thyroid cancer Cousin    • Ovarian cancer Paternal Aunt    • No Known Problems Paternal Grandmother    • No Known Problems Paternal Grandfather         Social History     Socioeconomic History   • Marital status:      Spouse name: Not on file   • Number of children: Not on file   • Years of education: Not on file   • Highest education level: Not on file   Occupational History   • Occupation:    Tobacco Use   • Smoking status: Never Smoker   • Smokeless tobacco: Never Used   Substance and Sexual Activity   • Alcohol use: No   • Drug use: No   • Sexual activity: Defer   Social History  "Narrative    Caffeine? 5 cups coffee daily        Vitals:    12/30/20 0750   BP: 108/60   BP Location: Left arm   Patient Position: Sitting   Cuff Size: Adult   Pulse: 82   SpO2: 98%   Weight: 73.2 kg (161 lb 6.4 oz)   Height: 167.6 cm (66\")      Body mass index is 26.05 kg/m².    Physical Exam  Vitals signs reviewed.   Constitutional:       General: She is not in acute distress.     Appearance: She is not ill-appearing.   HENT:      Right Ear: Tympanic membrane, ear canal and external ear normal.      Left Ear: Tympanic membrane, ear canal and external ear normal.   Neck:      Musculoskeletal: Neck supple.      Thyroid: No thyromegaly or thyroid tenderness.   Cardiovascular:      Rate and Rhythm: Normal rate and regular rhythm.      Heart sounds: No murmur.   Pulmonary:      Effort: Pulmonary effort is normal.      Breath sounds: Normal breath sounds.   Neurological:      Mental Status: She is alert.   Psychiatric:         Mood and Affect: Mood normal.         Behavior: Behavior normal.         Thought Content: Thought content normal.         Judgment: Judgment normal.          DEXA Bone Density Axial (12/07/2020 15:41)      Diagnoses and all orders for this visit:    1. Osteopenia of neck of left femur (Primary)  Reviewed bone density report with patient in detail.  Discussed weightbearing exercise, calcium, vitamin D supplementation.  Repeat 2 to 3-year intervals.  2. Acquired hypothyroidism  -     TSH Rfx On Abnormal To Free T4; Future  Patient would like to have primary care treat her hypothyroidism instead of endocrinology.  Check thyroid function will adjust levothyroxine as needed.  3. RSD (reflex sympathetic dystrophy)  -     gabapentin (NEURONTIN) 800 MG tablet; Take 1 tablet by mouth 3 (Three) Times a Day.  Dispense: 90 tablet; Refill: 2  -     DULoxetine (CYMBALTA) 60 MG capsule; Take 1 capsule by mouth Daily.  Dispense: 30 capsule; Refill: 5  -     amitriptyline (ELAVIL) 10 MG tablet; Take 1 tablet by " mouth 2 (Two) Times a Day.  Dispense: 60 tablet; Refill: 2  Stable.  Patient's medications have been optimized by pain management and is doing well with current regimen.  I am assuming management of the medications and advised she will need to be seen every 3 months.  Controlled substance agreement signed today and urine drug screening today as well.  The treatment plan includes a controlled substance.  Controlled Substance Medication Agreement signed and on file 12/30/2020. UDS 12/30/2020.  Risks, benefits, and alternative treatments reviewed.  NICKY report has been reviewed.     4. Burning mouth syndrome  -     gabapentin (NEURONTIN) 800 MG tablet; Take 1 tablet by mouth 3 (Three) Times a Day.  Dispense: 90 tablet; Refill: 2  -     DULoxetine (CYMBALTA) 60 MG capsule; Take 1 capsule by mouth Daily.  Dispense: 30 capsule; Refill: 5  -     amitriptyline (ELAVIL) 10 MG tablet; Take 1 tablet by mouth 2 (Two) Times a Day.  Dispense: 60 tablet; Refill: 2  Stable.  Patient's medications have been optimized by pain management and is doing well with current regimen.  I am assuming management of the medications and advised she will need to be seen every 3 months.  Controlled substance agreement signed today and urine drug screening today as well.  The treatment plan includes a controlled substance.  Controlled Substance Medication Agreement signed and on file 12/30/2020. UDS 12/30/2020.  Risks, benefits, and alternative treatments reviewed.  NICKY report has been reviewed.   5. STEPH (generalized anxiety disorder)  -     DULoxetine (CYMBALTA) 60 MG capsule; Take 1 capsule by mouth Daily.  Dispense: 30 capsule; Refill: 5  -     clonazePAM (KlonoPIN) 0.5 MG tablet; Take 1 tablet by mouth Daily.  Dispense: 30 tablet; Refill: 2  Stable.  Patient's last refills were provided by previous primary care provider.  I am assuming management of the medication and advised she will need to be seen every 3 months.  Controlled substance  agreement signed today and urine drug screening today as well.  The treatment plan includes a controlled substance.  Controlled Substance Medication Agreement signed and on file 12/30/2020. UDS 12/30/2020.  Risks, benefits, and alternative treatments reviewed.  NICKY report has been reviewed.   6. Controlled substance agreement signed    7. Therapeutic drug monitoring  -     Urine Drug Screen - Urine, Clean Catch; Future        Return in about 3 months (around 3/30/2021) for Office visit chronic pain, anxiety.    Electronically signed by Payton Schofield MD, 12/30/20, 8:39 AM EST.

## 2021-01-11 DIAGNOSIS — Z51.81 THERAPEUTIC DRUG MONITORING: ICD-10-CM

## 2021-01-12 ENCOUNTER — DOCUMENTATION (OUTPATIENT)
Dept: FAMILY MEDICINE CLINIC | Facility: CLINIC | Age: 61
End: 2021-01-12

## 2021-01-12 NOTE — PROGRESS NOTES
Called and spoke with MD labs regarding patient UDS. We received the report and it shows no results. They stated that with the tests that were ran, they all came back negative. She states that clonazepam will usually not show up on a reflex screening and would need to do a quantitative screening to see beneficial true results. She advised that we could add additional testing to the sample, I did so adding the quantitative testing for benzos and anticonvulsants. She is faxing over a form that will just need to be signed by the physician and faxed back and they can rerun those tests. I spoke with Dr EARL and she was fine with this. Will have her sign upon receipt.

## 2021-01-18 ENCOUNTER — IMMUNIZATION (OUTPATIENT)
Dept: VACCINE CLINIC | Facility: HOSPITAL | Age: 61
End: 2021-01-18

## 2021-01-18 PROCEDURE — 91300 HC SARSCOV02 VAC 30MCG/0.3ML IM: CPT | Performed by: INTERNAL MEDICINE

## 2021-01-18 PROCEDURE — 0001A: CPT | Performed by: INTERNAL MEDICINE

## 2021-02-08 ENCOUNTER — IMMUNIZATION (OUTPATIENT)
Dept: VACCINE CLINIC | Facility: HOSPITAL | Age: 61
End: 2021-02-08

## 2021-02-08 PROCEDURE — 91300 HC SARSCOV02 VAC 30MCG/0.3ML IM: CPT | Performed by: INTERNAL MEDICINE

## 2021-02-08 PROCEDURE — 0002A: CPT | Performed by: INTERNAL MEDICINE

## 2021-02-22 ENCOUNTER — TELEPHONE (OUTPATIENT)
Dept: FAMILY MEDICINE CLINIC | Facility: CLINIC | Age: 61
End: 2021-02-22

## 2021-02-22 RX ORDER — LEVOTHYROXINE SODIUM 0.03 MG/1
TABLET ORAL
Qty: 90 TABLET | Refills: 0 | Status: SHIPPED | OUTPATIENT
Start: 2021-02-22 | End: 2021-03-31 | Stop reason: SDUPTHER

## 2021-02-22 NOTE — TELEPHONE ENCOUNTER
[facility] CALLED FOR DRUG INTERACTION BETWEEN amitriptyline (ELAVIL) 10 MG tablet AND DULoxetine (CYMBALTA) 60 MG capsule.  PHARMACY NEEDS LAST LEVELS OF AMITRIPTYLINE THAT WERE TAKEN.      PLEASE CALL MARYAN @ [facility] [phone]

## 2021-02-22 NOTE — TELEPHONE ENCOUNTER
Contacted pharmacy and spoke with Cindi, I informed her that per the patient's PCP the patient is stable clinically with duloxetine and amitriptyline. No blood levels are checked. Pharmacists verbalized understanding and did not have any additional questions at this time.

## 2021-03-22 DIAGNOSIS — K14.6 BURNING MOUTH SYNDROME: ICD-10-CM

## 2021-03-22 DIAGNOSIS — G90.50 RSD (REFLEX SYMPATHETIC DYSTROPHY): ICD-10-CM

## 2021-03-22 RX ORDER — AMITRIPTYLINE HYDROCHLORIDE 10 MG/1
TABLET, FILM COATED ORAL
Qty: 60 TABLET | Refills: 0 | Status: SHIPPED | OUTPATIENT
Start: 2021-03-22 | End: 2021-03-30 | Stop reason: SDUPTHER

## 2021-03-30 ENCOUNTER — LAB (OUTPATIENT)
Dept: LAB | Facility: HOSPITAL | Age: 61
End: 2021-03-30

## 2021-03-30 ENCOUNTER — OFFICE VISIT (OUTPATIENT)
Dept: FAMILY MEDICINE CLINIC | Facility: CLINIC | Age: 61
End: 2021-03-30

## 2021-03-30 VITALS
HEART RATE: 82 BPM | SYSTOLIC BLOOD PRESSURE: 110 MMHG | DIASTOLIC BLOOD PRESSURE: 72 MMHG | HEIGHT: 66 IN | BODY MASS INDEX: 26.07 KG/M2 | OXYGEN SATURATION: 98 % | WEIGHT: 162.2 LBS

## 2021-03-30 DIAGNOSIS — G90.50 RSD (REFLEX SYMPATHETIC DYSTROPHY): Primary | Chronic | ICD-10-CM

## 2021-03-30 DIAGNOSIS — K21.9 GASTROESOPHAGEAL REFLUX DISEASE, UNSPECIFIED WHETHER ESOPHAGITIS PRESENT: ICD-10-CM

## 2021-03-30 DIAGNOSIS — E03.9 ACQUIRED HYPOTHYROIDISM: ICD-10-CM

## 2021-03-30 DIAGNOSIS — E03.9 ACQUIRED HYPOTHYROIDISM: Chronic | ICD-10-CM

## 2021-03-30 DIAGNOSIS — K14.6 BURNING MOUTH SYNDROME: ICD-10-CM

## 2021-03-30 DIAGNOSIS — F41.1 GAD (GENERALIZED ANXIETY DISORDER): Chronic | ICD-10-CM

## 2021-03-30 DIAGNOSIS — E66.3 OVERWEIGHT WITH BODY MASS INDEX (BMI) OF 26 TO 26.9 IN ADULT: ICD-10-CM

## 2021-03-30 LAB — TSH SERPL DL<=0.05 MIU/L-ACNC: 2.76 UIU/ML (ref 0.27–4.2)

## 2021-03-30 PROCEDURE — 99214 OFFICE O/P EST MOD 30 MIN: CPT | Performed by: FAMILY MEDICINE

## 2021-03-30 PROCEDURE — 84443 ASSAY THYROID STIM HORMONE: CPT

## 2021-03-30 RX ORDER — CLONAZEPAM 0.5 MG/1
0.5 TABLET ORAL DAILY
Qty: 30 TABLET | Refills: 2 | Status: SHIPPED | OUTPATIENT
Start: 2021-03-30 | End: 2021-05-26 | Stop reason: SDUPTHER

## 2021-03-30 RX ORDER — AMITRIPTYLINE HYDROCHLORIDE 10 MG/1
10 TABLET, FILM COATED ORAL 2 TIMES DAILY
Qty: 60 TABLET | Refills: 2 | Status: SHIPPED | OUTPATIENT
Start: 2021-03-30 | End: 2021-06-28 | Stop reason: SDUPTHER

## 2021-03-30 RX ORDER — GABAPENTIN 800 MG/1
800 TABLET ORAL 3 TIMES DAILY
Qty: 90 TABLET | Refills: 2 | Status: SHIPPED | OUTPATIENT
Start: 2021-03-30 | End: 2021-06-28 | Stop reason: SDUPTHER

## 2021-03-30 NOTE — PROGRESS NOTES
"Chief Complaint  Pain and Anxiety    Subjective          Lisbeth Perdue presents to [facility]  History of Present Illness     STEPH-7  Over the last two weeks, how often have you been bothered by the following problems?  Feeling nervous, anxious or on edge: 1  Not being able to stop or control worryin  Worrying too much about different things: 2  Trouble Relaxin  Being so restless that it is hard to sit still: 1  Becoming easily annoyed or irritable: 1  Feeling afraid as if something awful might happen: 2  STEPH 7 Total Score: 11  If you checked any problems, how difficult have these problems made it for you to do your work, take care of things at home, or get along with other people: Not difficult at all    She will be retiring tomorrow. She has condo in [place]. She uses klonopin every night. Initially prescribed for burning mouth, flares up when she doesn't take it.     She is doing ok. She has gained weight. Planning on increasing physical activity. She has no exercise right now.     She has more acid reflux with weight gain. She takes omeprazole 20mg.     She went off levothyroxine for awhile, then endocrinology restarted low dose 2 years.         Objective   Vital Signs:   /72 (BP Location: Left arm, Patient Position: Sitting, Cuff Size: Adult)   Pulse 82   Ht 167.6 cm (66\")   Wt 73.6 kg (162 lb 3.2 oz)   SpO2 98%   BMI 26.18 kg/m²     Physical Exam  Vitals reviewed.   Constitutional:       General: She is not in acute distress.     Appearance: She is not ill-appearing.   Neck:      Thyroid: No thyroid mass, thyromegaly or thyroid tenderness.   Cardiovascular:      Rate and Rhythm: Normal rate and regular rhythm.   Pulmonary:      Effort: Pulmonary effort is normal.      Breath sounds: Normal breath sounds.   Neurological:      Mental Status: She is alert.        Result Review :   The following data was reviewed by: Payton Schofield MD on " 03/30/2021:             Urine Drug Screen - Urine, Clean Catch (12/30/2020)  CONTROLLED SUBSTANCE AGREEMENT - SCAN - IMMUN MGE PC NICHRD (12/30/2020)    Assessment and Plan    Diagnoses and all orders for this visit:    1. RSD (reflex sympathetic dystrophy) (Primary)  -     amitriptyline (ELAVIL) 10 MG tablet; Take 1 tablet by mouth 2 (Two) Times a Day.  Dispense: 60 tablet; Refill: 2  -     gabapentin (NEURONTIN) 800 MG tablet; Take 1 tablet by mouth 3 (Three) Times a Day.  Dispense: 90 tablet; Refill: 2  Continue elavil and gabapentin.   2. Burning mouth syndrome  -     amitriptyline (ELAVIL) 10 MG tablet; Take 1 tablet by mouth 2 (Two) Times a Day.  Dispense: 60 tablet; Refill: 2  -     clonazePAM (KlonoPIN) 0.5 MG tablet; Take 1 tablet by mouth Daily.  Dispense: 30 tablet; Refill: 2  -     gabapentin (NEURONTIN) 800 MG tablet; Take 1 tablet by mouth 3 (Three) Times a Day.  Dispense: 90 tablet; Refill: 2  Stable. Continue elavil, klonopin and gabapentin.   3. STEPH (generalized anxiety disorder)  -     clonazePAM (KlonoPIN) 0.5 MG tablet; Take 1 tablet by mouth Daily.  Dispense: 30 tablet; Refill: 2  Continue klonopin nightly.   4. Gastroesophageal reflux disease, unspecified whether esophagitis present  Worse with weight gain. Discussed long-term use of PPIs, try to limit omeprazole use.   5. Acquired hypothyroidism  Check thyroid function today. She would like to transfer prescription to me instead of endocrinology. Adjust levothyroxine dose as needed.   6. Overweight with body mass index (BMI) of 26 to 26.9 in adult  Patient's (Body mass index is 26.18 kg/m².) indicates that they are overweight (BMI 25-29.9) with obesity-related health conditions that include GERD . Obesity is worsening. BMI is is above average; BMI management plan is completed. We discussed increasing exercise.       The treatment plan includes a controlled substance.  Controlled Substance Medication Agreement signed and on file. Reviewed UDS.   Risks, benefits, and alternative treatments reviewed.  NICKY report 03/30/21 has been reviewed. Discussed need to be seen every 3 months for controlled prescriptions. When she raymond in [place] either establish with local physician for controlled medications or return to see me in [place]. I am unable to provide virtual care when she is residing in [place].         Follow Up   Return in about 3 months (around 7/8/2021) for Physical, chronic pain, anxiety.  Patient was given instructions and counseling regarding her condition or for health maintenance advice. Please see specific information pulled into the AVS if appropriate.     Electronically signed by Payton Schofield MD, 03/30/21, 8:20 AM EDT.

## 2021-03-31 RX ORDER — LEVOTHYROXINE SODIUM 0.03 MG/1
TABLET ORAL
Qty: 90 TABLET | Refills: 3 | Status: SHIPPED | OUTPATIENT
Start: 2021-03-31 | End: 2022-04-11

## 2021-05-26 DIAGNOSIS — F41.1 GAD (GENERALIZED ANXIETY DISORDER): Chronic | ICD-10-CM

## 2021-05-26 DIAGNOSIS — K14.6 BURNING MOUTH SYNDROME: ICD-10-CM

## 2021-05-26 RX ORDER — CLONAZEPAM 0.5 MG/1
0.5 TABLET ORAL DAILY
Qty: 30 TABLET | Refills: 0 | Status: SHIPPED | OUTPATIENT
Start: 2021-05-26 | End: 2021-06-30

## 2021-05-26 NOTE — TELEPHONE ENCOUNTER
Caller: Lisbeth Perdue    Relationship: Self    Best call back number: [phone]    Medication needed:   Requested Prescriptions     Pending Prescriptions Disp Refills   • clonazePAM (KlonoPIN) 0.5 MG tablet 30 tablet 2     Sig: Take 1 tablet by mouth Daily.       When do you need the refill by: ASAP    What additional details did the patient provide when requesting the medication: PATIENT STATED THAT SHE WILL BE COMPLETELY OUT OVER WEEKEND AND WOULD NEED REFILL SENT TO [facility]     Does the patient have less than a 3 day supply:  [x] Yes  [] No    What is the patient's preferred pharmacy: [id number] - [place], [place] - [address] [place] [address] [phone]  - [phone] FX

## 2021-05-26 NOTE — TELEPHONE ENCOUNTER
Last fill:3/30/21  Last office visit: 3/30/21  Next office visit: 6/28/21  CSA up-to-date? 12/30/20  Date of last UDS: 12/30/20  UDS consistent:     LOOKS LIKE PATIENT SHOULD HAVE ONE REFILL LEFT FROM THE LAST PRESCRIPTION THAT WAS SENT IN

## 2021-06-18 DIAGNOSIS — F41.1 GAD (GENERALIZED ANXIETY DISORDER): Chronic | ICD-10-CM

## 2021-06-18 DIAGNOSIS — K14.6 BURNING MOUTH SYNDROME: ICD-10-CM

## 2021-06-18 DIAGNOSIS — G90.50 RSD (REFLEX SYMPATHETIC DYSTROPHY): ICD-10-CM

## 2021-06-21 RX ORDER — DULOXETIN HYDROCHLORIDE 60 MG/1
CAPSULE, DELAYED RELEASE ORAL
Qty: 30 CAPSULE | Refills: 5 | Status: SHIPPED | OUTPATIENT
Start: 2021-06-21 | End: 2021-09-24

## 2021-06-28 ENCOUNTER — OFFICE VISIT (OUTPATIENT)
Dept: FAMILY MEDICINE CLINIC | Facility: CLINIC | Age: 61
End: 2021-06-28

## 2021-06-28 ENCOUNTER — LAB (OUTPATIENT)
Dept: LAB | Facility: HOSPITAL | Age: 61
End: 2021-06-28

## 2021-06-28 VITALS
BODY MASS INDEX: 26.36 KG/M2 | WEIGHT: 164 LBS | DIASTOLIC BLOOD PRESSURE: 80 MMHG | SYSTOLIC BLOOD PRESSURE: 108 MMHG | HEART RATE: 71 BPM | OXYGEN SATURATION: 97 % | HEIGHT: 66 IN

## 2021-06-28 DIAGNOSIS — K14.6 BURNING MOUTH SYNDROME: ICD-10-CM

## 2021-06-28 DIAGNOSIS — G90.50 RSD (REFLEX SYMPATHETIC DYSTROPHY): Primary | Chronic | ICD-10-CM

## 2021-06-28 DIAGNOSIS — Z00.00 WELL ADULT EXAM: ICD-10-CM

## 2021-06-28 DIAGNOSIS — E55.9 VITAMIN D DEFICIENCY: Chronic | ICD-10-CM

## 2021-06-28 DIAGNOSIS — K59.09 CHRONIC CONSTIPATION: ICD-10-CM

## 2021-06-28 DIAGNOSIS — H61.21 IMPACTED CERUMEN OF RIGHT EAR: ICD-10-CM

## 2021-06-28 LAB
25(OH)D3 SERPL-MCNC: 43.2 NG/ML (ref 30–100)
ALBUMIN SERPL-MCNC: 4.6 G/DL (ref 3.5–5.2)
ALBUMIN/GLOB SERPL: 1.8 G/DL
ALP SERPL-CCNC: 52 U/L (ref 39–117)
ALT SERPL W P-5'-P-CCNC: 17 U/L (ref 1–33)
ANION GAP SERPL CALCULATED.3IONS-SCNC: 10.2 MMOL/L (ref 5–15)
AST SERPL-CCNC: 23 U/L (ref 1–32)
BILIRUB SERPL-MCNC: 0.4 MG/DL (ref 0–1.2)
BUN SERPL-MCNC: 14 MG/DL (ref 8–23)
BUN/CREAT SERPL: 16.9 (ref 7–25)
CALCIUM SPEC-SCNC: 9.7 MG/DL (ref 8.6–10.5)
CHLORIDE SERPL-SCNC: 104 MMOL/L (ref 98–107)
CHOLEST SERPL-MCNC: 299 MG/DL (ref 0–200)
CO2 SERPL-SCNC: 27.8 MMOL/L (ref 22–29)
CREAT SERPL-MCNC: 0.83 MG/DL (ref 0.57–1)
DEPRECATED RDW RBC AUTO: 38.9 FL (ref 37–54)
ERYTHROCYTE [DISTWIDTH] IN BLOOD BY AUTOMATED COUNT: 12.6 % (ref 12.3–15.4)
GFR SERPL CREATININE-BSD FRML MDRD: 70 ML/MIN/1.73
GLOBULIN UR ELPH-MCNC: 2.5 GM/DL
GLUCOSE SERPL-MCNC: 92 MG/DL (ref 65–99)
HCT VFR BLD AUTO: 39.7 % (ref 34–46.6)
HDLC SERPL-MCNC: 89 MG/DL (ref 40–60)
HGB BLD-MCNC: 12.9 G/DL (ref 12–15.9)
LDLC SERPL CALC-MCNC: 194 MG/DL (ref 0–100)
LDLC/HDLC SERPL: 2.14 {RATIO}
MCH RBC QN AUTO: 28.2 PG (ref 26.6–33)
MCHC RBC AUTO-ENTMCNC: 32.5 G/DL (ref 31.5–35.7)
MCV RBC AUTO: 86.9 FL (ref 79–97)
PLATELET # BLD AUTO: 273 10*3/MM3 (ref 140–450)
PMV BLD AUTO: 11.7 FL (ref 6–12)
POTASSIUM SERPL-SCNC: 4.9 MMOL/L (ref 3.5–5.2)
PROT SERPL-MCNC: 7.1 G/DL (ref 6–8.5)
RBC # BLD AUTO: 4.57 10*6/MM3 (ref 3.77–5.28)
SODIUM SERPL-SCNC: 142 MMOL/L (ref 136–145)
TRIGL SERPL-MCNC: 98 MG/DL (ref 0–150)
TSH SERPL DL<=0.05 MIU/L-ACNC: 1.86 UIU/ML (ref 0.27–4.2)
VLDLC SERPL-MCNC: 16 MG/DL (ref 5–40)
WBC # BLD AUTO: 4.93 10*3/MM3 (ref 3.4–10.8)

## 2021-06-28 PROCEDURE — 80053 COMPREHEN METABOLIC PANEL: CPT

## 2021-06-28 PROCEDURE — 69210 REMOVE IMPACTED EAR WAX UNI: CPT | Performed by: FAMILY MEDICINE

## 2021-06-28 PROCEDURE — 36415 COLL VENOUS BLD VENIPUNCTURE: CPT

## 2021-06-28 PROCEDURE — 84443 ASSAY THYROID STIM HORMONE: CPT

## 2021-06-28 PROCEDURE — 99214 OFFICE O/P EST MOD 30 MIN: CPT | Performed by: FAMILY MEDICINE

## 2021-06-28 PROCEDURE — 82306 VITAMIN D 25 HYDROXY: CPT

## 2021-06-28 PROCEDURE — 85027 COMPLETE CBC AUTOMATED: CPT

## 2021-06-28 PROCEDURE — 80061 LIPID PANEL: CPT

## 2021-06-28 RX ORDER — GABAPENTIN 800 MG/1
800 TABLET ORAL 3 TIMES DAILY
Qty: 90 TABLET | Refills: 2 | Status: SHIPPED | OUTPATIENT
Start: 2021-06-28 | End: 2021-09-24 | Stop reason: SDUPTHER

## 2021-06-28 RX ORDER — AMITRIPTYLINE HYDROCHLORIDE 10 MG/1
5-10 TABLET, FILM COATED ORAL 2 TIMES DAILY
Qty: 60 TABLET | Refills: 5 | Status: SHIPPED | OUTPATIENT
Start: 2021-06-28 | End: 2021-09-24

## 2021-06-28 NOTE — PATIENT INSTRUCTIONS
Please fast for at least 8 hours prior to having labs collected.  Okay to drink water, black coffee, or black tea.  You may take medications and supplements.

## 2021-06-28 NOTE — PROGRESS NOTES
"Chief Complaint  Med Refill (gabapentin, clonazepam,amitriptyline), Pain (f/u), and Anxiety (f/u)    Subjective          Lisbeth Perdue presents to [facility]  History of Present Illness     She has concerns about urinary retention. She feels something changing. She couldn't feel it when she urinated. Sometimes has urge to go and it takes a long time to empty her bladder. Nocturia 2-3 times a night on occasion. Tied to bowel movements. Chronic constipation. She took Linzess and it worked, but insurance preferred Trulance. Amitriptyline helps burning mouth more than RSD. If she forgets dose at noon, her mouth triggers it.     She has noticed in last couple years memory is not as good. Harder to remember and think about complex thoughts. Mother has memory issues.         Objective   Vital Signs:   /80   Pulse 71   Ht 167.6 cm (66\")   Wt 74.4 kg (164 lb)   SpO2 97%   BMI 26.47 kg/m²     Physical Exam  Vitals reviewed.   Constitutional:       General: She is not in acute distress.     Appearance: She is not ill-appearing.   HENT:      Right Ear: There is impacted cerumen.      Left Ear: Tympanic membrane and ear canal normal. There is no impacted cerumen.   Cardiovascular:      Rate and Rhythm: Normal rate and regular rhythm.   Pulmonary:      Effort: Pulmonary effort is normal.      Breath sounds: Normal breath sounds.   Neurological:      Mental Status: She is alert.        Result Review :   The following data was reviewed by: Payton Schofield MD on 06/28/2021:       Ear Cerumen Removal  Performed by: Payton Vasquez MD  Authorized by: Payton Vasquez MD     Anesthesia:  Local Anesthetic: none  Location details: right ear  Patient tolerance: patient tolerated the procedure well with no immediate complications  Procedure type: instrumentation   Sedation:  Patient sedated: no             Urine Drug Screen - Urine, Clean Catch " (12/30/2020)  CONTROLLED SUBSTANCE AGREEMENT - SCAN - IMMUN MGE PC NICHRD (12/30/2020)    Assessment and Plan    Diagnoses and all orders for this visit:    1. RSD (reflex sympathetic dystrophy) (Primary)  -     amitriptyline (ELAVIL) 10 MG tablet; Take 0.5-1 tablets by mouth 2 (Two) Times a Day.  Dispense: 60 tablet; Refill: 5  -     gabapentin (NEURONTIN) 800 MG tablet; Take 1 tablet by mouth 3 (Three) Times a Day.  Dispense: 90 tablet; Refill: 2  Continue gabapentin and amitriptyline.  The treatment plan includes a controlled substance.  Controlled Substance Medication Agreement signed and on file. Reviewed UDS.  Risks, benefits, and alternative treatments reviewed.  [facility] report 07/09/21 has been reviewed.   2. Burning mouth syndrome  -     amitriptyline (ELAVIL) 10 MG tablet; Take 0.5-1 tablets by mouth 2 (Two) Times a Day.  Dispense: 60 tablet; Refill: 5  -     gabapentin (NEURONTIN) 800 MG tablet; Take 1 tablet by mouth 3 (Three) Times a Day.  Dispense: 90 tablet; Refill: 2  Amitriptyline and clonazepam for burning mouth syndrome.  Potential side effects amitriptyline causing urinary retention.  We will try decreasing to 10 mg taking either once a day or splitting to 5 mg twice a day.  Continue gabapentin.  The treatment plan includes a controlled substance.  Controlled Substance Medication Agreement signed and on file. Reviewed UDS.  Risks, benefits, and alternative treatments reviewed.  NICKY report 07/09/21 has been reviewed.   3. Chronic constipation  Possible worsening with amitriptyline.  Trial of reducing dose as above.  4. Impacted cerumen of right ear  -     Ear Cerumen Removal  Excess removal.  5. Well adult exam  -     CBC (No Diff); Future  -     Comprehensive Metabolic Panel; Future  -     TSH Rfx On Abnormal To Free T4; Future  -     Lipid Panel; Future  Check previsit fasting labs.  6. Vitamin D deficiency  -     Vitamin D 25 Hydroxy; Future        Follow Up   Return in 11 days (on 7/9/2021)  for as scheduled.  Patient was given instructions and counseling regarding her condition or for health maintenance advice. Please see specific information pulled into the AVS if appropriate.     Electronically signed by Payton Schofield MD, 06/28/21, 11:23 AM EDT.

## 2021-06-29 DIAGNOSIS — K14.6 BURNING MOUTH SYNDROME: ICD-10-CM

## 2021-06-29 DIAGNOSIS — F41.1 GAD (GENERALIZED ANXIETY DISORDER): Chronic | ICD-10-CM

## 2021-06-30 RX ORDER — CLONAZEPAM 0.5 MG/1
0.5 TABLET ORAL DAILY
Qty: 30 TABLET | Refills: 0 | OUTPATIENT
Start: 2021-06-30

## 2021-06-30 RX ORDER — CLONAZEPAM 0.5 MG/1
TABLET ORAL
Qty: 30 TABLET | Refills: 2 | Status: SHIPPED | OUTPATIENT
Start: 2021-06-30 | End: 2021-09-24 | Stop reason: SDUPTHER

## 2021-07-09 ENCOUNTER — OFFICE VISIT (OUTPATIENT)
Dept: FAMILY MEDICINE CLINIC | Facility: CLINIC | Age: 61
End: 2021-07-09

## 2021-07-09 VITALS
HEART RATE: 78 BPM | OXYGEN SATURATION: 98 % | DIASTOLIC BLOOD PRESSURE: 80 MMHG | BODY MASS INDEX: 26.16 KG/M2 | WEIGHT: 162.8 LBS | SYSTOLIC BLOOD PRESSURE: 126 MMHG | HEIGHT: 66 IN

## 2021-07-09 DIAGNOSIS — N89.8 VAGINAL DRYNESS: ICD-10-CM

## 2021-07-09 DIAGNOSIS — K14.6 BURNING MOUTH SYNDROME: Chronic | ICD-10-CM

## 2021-07-09 DIAGNOSIS — E78.00 PURE HYPERCHOLESTEROLEMIA: Chronic | ICD-10-CM

## 2021-07-09 DIAGNOSIS — F41.1 GAD (GENERALIZED ANXIETY DISORDER): Chronic | ICD-10-CM

## 2021-07-09 DIAGNOSIS — E55.9 VITAMIN D DEFICIENCY: Chronic | ICD-10-CM

## 2021-07-09 DIAGNOSIS — E66.3 OVERWEIGHT (BMI 25.0-29.9): ICD-10-CM

## 2021-07-09 DIAGNOSIS — G90.50 RSD (REFLEX SYMPATHETIC DYSTROPHY): Chronic | ICD-10-CM

## 2021-07-09 DIAGNOSIS — E03.9 ACQUIRED HYPOTHYROIDISM: Chronic | ICD-10-CM

## 2021-07-09 DIAGNOSIS — Z00.00 WELL ADULT EXAM: Primary | ICD-10-CM

## 2021-07-09 PROCEDURE — 99396 PREV VISIT EST AGE 40-64: CPT | Performed by: FAMILY MEDICINE

## 2021-07-09 RX ORDER — ROSUVASTATIN CALCIUM 5 MG/1
5 TABLET, COATED ORAL NIGHTLY
Qty: 30 TABLET | Refills: 2 | Status: SHIPPED | OUTPATIENT
Start: 2021-07-09 | End: 2021-09-24 | Stop reason: SDUPTHER

## 2021-07-09 NOTE — PROGRESS NOTES
"Chief Complaint  Annual Exam (Pt. is here for annual physical. )    Subjective          Lisbeth Perdue presents to [facility] for   History of Present Illness     Diet is terrible. She doesn't cook. Eats out often or frozen meals. Not a lot of produce. Banana every day, plans to start apple and oatmeal. She has bought sweet potatoes. She used to drink Irish press coffee  to 6 per day. She has never taken. Grandfather  of massive heart attack at age 66.     She is taking amitriptyline once in morning and cut out afternoon dose. No major difference. She is thinking of weaning off amitriptyline to burning mouth. No change in dry mouth or constipation.      diagnosed with Shingles so she got her Shingles shot.     Since she stopped HRT she has painful intercourse and bleeding.     Mother diagnosed with mild cognitive impairment.     RSD controlled with gabapentin and duloxetine.       Objective   Vital Signs:   Vitals:    21 1043   BP: 126/80   Pulse: 78   SpO2: 98%   Weight: 73.8 kg (162 lb 12.8 oz)   Height: 167.6 cm (65.98\")   PainSc: 0-No pain     Body mass index is 26.29 kg/m².      Physical Exam  Constitutional:       General: She is not in acute distress.  HENT:      Right Ear: Tympanic membrane and ear canal normal.      Left Ear: Tympanic membrane and ear canal normal.   Eyes:      General:         Right eye: No discharge.         Left eye: No discharge.      Conjunctiva/sclera: Conjunctivae normal.   Neck:      Thyroid: No thyromegaly.   Cardiovascular:      Rate and Rhythm: Normal rate and regular rhythm.   Pulmonary:      Effort: Pulmonary effort is normal.      Breath sounds: Normal breath sounds.   Abdominal:      Palpations: Abdomen is soft.      Tenderness: There is no abdominal tenderness.   Musculoskeletal:      Cervical back: Neck supple.   Lymphadenopathy:      Head:      Right side of head: No submandibular, preauricular or posterior " auricular adenopathy.      Left side of head: No submandibular, preauricular or posterior auricular adenopathy.      Cervical: No cervical adenopathy.   Skin:     General: Skin is warm and dry.   Neurological:      Mental Status: She is alert and oriented to person, place, and time.   Psychiatric:         Mood and Affect: Mood normal.         Behavior: Behavior normal.         Thought Content: Thought content normal.         Judgment: Judgment normal.        Result Review :                Immunization History   Administered Date(s) Administered   • COVID-19 (PFIZER) 01/18/2021, 02/08/2021   • Flulaval/Fluarix/Fluzone Quad 10/17/2017, 10/09/2020   • Influenza Split Preservative Free ID 12/30/2013   • Influenza, Unspecified 10/05/2020   • Shingrix 07/06/2021   • Tdap 12/23/2011, 09/08/2017   • Zoster, Unspecified 07/06/2021   • flucelvax quad pfs =>4 YRS 10/08/2019       Health Maintenance   Topic Date Due   • INFLUENZA VACCINE  08/01/2021   • ZOSTER VACCINE (2 of 2) 08/31/2021   • MAMMOGRAM  09/01/2021   • LIPID PANEL  06/28/2022   • DXA SCAN  12/07/2022   • TDAP/TD VACCINES (3 - Td or Tdap) 09/08/2027   • PAP SMEAR  Discontinued     SCANNED - MAMMO (09/01/2020)    Vitamin D 25 Hydroxy (06/28/2021 11:04)  Lipid Panel (06/28/2021 11:04)  TSH Rfx On Abnormal To Free T4 (06/28/2021 11:04)  Comprehensive Metabolic Panel (06/28/2021 11:04)  CBC (No Diff) (06/28/2021 11:04)    LABORATORY - SCAN - URINE DRUG SCREEN MD LABS 12/30/20 (12/30/2020)  CONTROLLED SUBSTANCE AGREEMENT - SCAN - IMMUN MGE PC NICHRD (12/30/2020)       Assessment and Plan    Diagnoses and all orders for this visit:    1. Well adult exam (Primary)  Reviewed previsit labs with patient in detail.  She recently received shingles vaccine.  No Pap needed status post hysterectomy.  Colon cancer screening of to date from 2016.  Mammogram up-to-date.  DEXA up-to-date.  2. Pure hypercholesterolemia  -     rosuvastatin (Crestor) 5 MG tablet; Take 1 tablet by mouth  Every Night.  Dispense: 30 tablet; Refill: 2  -     Lipid Panel; Future  -     Comprehensive metabolic panel; Future  Cholesterol worse.  Although ASCVD risk score is low the LDL greater than 190 is a high risk condition and recommend initiating therapy with statin.  Also discussed dietary changes with Mediterranean diet and handout provided.  Plan to recheck her cholesterol in September before she leaves for [place].  The 10-year ASCVD risk score (Austin COLE Jr., et al., 2013) is: 3.2%    Values used to calculate the score:      Age: 60 years      Sex: Female      Is Non- : No      Diabetic: No      Tobacco smoker: No      Systolic Blood Pressure: 126 mmHg      Is BP treated: No      HDL Cholesterol: 89 mg/dL      Total Cholesterol: 299 mg/dL  3. Vitamin D deficiency  Normal range of supplementation.  4. Acquired hypothyroidism  Stable with current dose of levothyroxine.  5. Burning mouth syndrome  Continue to taper dose of amitriptyline.  6. RSD (reflex sympathetic dystrophy)  Continue gabapentin and duloxetine.  7. STEPH (generalized anxiety disorder)  Stable.  8. Vaginal dryness  Discussed vaginal moisturizers.  9. Overweight (BMI 25.0-29.9)  Patient's (Body mass index is 26.29 kg/m².) indicates that they are overweight (BMI 25-29.9) with obesity-related health conditions that include dyslipidemias . Obesity is unchanged. BMI is is above average; BMI management plan is completed. We discussed increasing exercise and Mediterranean diet.           Counseled on health maintenance topics and preventative care recommendations: Cervical cancer screening, GYN exam, diet      Follow Up   Return in about 11 weeks (around 9/24/2021) for Office visit, as scheduled AND physical in 1 year.  Patient was given instructions and counseling regarding her condition or for health maintenance advice. Please see specific information pulled into the AVS if appropriate.      Electronically signed by Payton Carey  MD Kanwal, 07/09/21, 12:45 PM EDT.

## 2021-07-09 NOTE — PATIENT INSTRUCTIONS
Mediterranean Diet  A Mediterranean diet refers to food and lifestyle choices that are based on the traditions of countries located on the [place]. This way of eating has been shown to help prevent certain conditions and improve outcomes for people who have chronic diseases, like kidney disease and heart disease.  What are tips for following this plan?  Lifestyle  · Cook and eat meals together with your family, when possible.  · Drink enough fluid to keep your urine clear or pale yellow.  · Be physically active every day. This includes:  ? Aerobic exercise like running or swimming.  ? Leisure activities like gardening, walking, or housework.  · Get 7-8 hours of sleep each night.  · If recommended by your health care provider, drink red wine in moderation. This means 1 glass a day for nonpregnant women and 2 glasses a day for men. A glass of wine equals 5 oz (150 mL).  Reading food labels    · Check the serving size of packaged foods. For foods such as rice and pasta, the serving size refers to the amount of cooked product, not dry.  · Check the total fat in packaged foods. Avoid foods that have saturated fat or trans fats.  · Check the ingredients list for added sugars, such as corn syrup.  Shopping  · At the grocery store, buy most of your food from the areas near the walls of the store. This includes:  ? Fresh fruits and vegetables (produce).  ? Grains, beans, nuts, and seeds. Some of these may be available in unpackaged forms or large amounts (in bulk).  ? Fresh seafood.  ? Poultry and eggs.  ? Low-fat dairy products.  · Buy whole ingredients instead of prepackaged foods.  · Buy fresh fruits and vegetables in-season from local farmers markets.  · Buy frozen fruits and vegetables in resealable bags.  · If you do not have access to quality fresh seafood, buy precooked frozen shrimp or canned fish, such as tuna, salmon, or sardines.  · Buy small amounts of raw or cooked vegetables, salads, or olives from  the deli or salad bar at your store.  · Stock your pantry so you always have certain foods on hand, such as olive oil, canned tuna, canned tomatoes, rice, pasta, and beans.  Cooking  · Cook foods with extra-virgin olive oil instead of using butter or other vegetable oils.  · Have meat as a side dish, and have vegetables or grains as your main dish. This means having meat in small portions or adding small amounts of meat to foods like pasta or stew.  · Use beans or vegetables instead of meat in common dishes like chili or lasagna.  · [place] with different cooking methods. Try roasting or broiling vegetables instead of steaming or sautéeing them.  · Add frozen vegetables to soups, stews, pasta, or rice.  · Add nuts or seeds for added healthy fat at each meal. You can add these to yogurt, salads, or vegetable dishes.  · Marinate fish or vegetables using olive oil, lemon juice, garlic, and fresh herbs.  Meal planning    · Plan to eat 1 vegetarian meal one day each week. Try to work up to 2 vegetarian meals, if possible.  · Eat seafood 2 or more times a week.  · Have healthy snacks readily available, such as:  ? Vegetable sticks with hummus.  ? Greek yogurt.  ? Fruit and nut trail mix.  · Eat balanced meals throughout the week. This includes:  ? Fruit: 2-3 servings a day  ? Vegetables: 4-5 servings a day  ? Low-fat dairy: 2 servings a day  ? Fish, poultry, or lean meat: 1 serving a day  ? Beans and legumes: 2 or more servings a week  ? Nuts and seeds: 1-2 servings a day  ? Whole grains: 6-8 servings a day  ? Extra-virgin olive oil: 3-4 servings a day  · Limit red meat and sweets to only a few servings a month  What are my food choices?  · Mediterranean diet  ? Recommended  § Grains: Whole-grain pasta. Brown rice. Bulgar wheat. Polenta. Couscous. Whole-wheat bread. Oatmeal. Quinoa.  § Vegetables: Artichokes. Beets. Broccoli. Cabbage. Carrots. Eggplant. Green beans. Chard. Kale. Spinach. Onions. Leeks. Peas. Squash.  Tomatoes. Peppers. Radishes.  § Fruits: Apples. Apricots. Avocado. Berries. Bananas. Cherries. Dates. Figs. Grapes. Slava. Melon. Oranges. Peaches. Plums. Pomegranate.  § Meats and other protein foods: Beans. Almonds. Sunflower seeds. Pine nuts. Peanuts. Cod. Amorita. Scallops. Shrimp. Tuna. Tilapia. Clams. Oysters. Eggs.  § Dairy: Low-fat milk. Cheese. Greek yogurt.  § Beverages: Water. Red wine. Herbal tea.  § Fats and oils: Extra virgin olive oil. Avocado oil. Grape seed oil.  § Sweets and desserts: Greek yogurt with honey. Baked apples. Poached pears. Trail mix.  § Seasoning and other foods: Basil. Cilantro. Coriander. Cumin. Mint. Parsley. Darek. Rosemary. Tarragon. Garlic. Oregano. Thyme. Pepper. Balsalmic vinegar. Tahini. Hummus. Tomato sauce. Olives. Mushrooms.  ? Limit these  § Grains: Prepackaged pasta or rice dishes. Prepackaged cereal with added sugar.  § Vegetables: Deep fried potatoes (french fries).  § Fruits: Fruit canned in syrup.  § Meats and other protein foods: Beef. Pork. Lamb. Poultry with skin. Hot dogs. Pedro.  § Dairy: Ice cream. Sour cream. Whole milk.  § Beverages: Juice. Sugar-sweetened soft drinks. Beer. Liquor and spirits.  § Fats and oils: Butter. Canola oil. Vegetable oil. Beef fat (tallow). Lard.  § Sweets and desserts: Cookies. Cakes. Pies. Candy.  § Seasoning and other foods: Mayonnaise. Premade sauces and marinades.  The items listed may not be a complete list. Talk with your dietitian about what dietary choices are right for you.  Summary  · The Mediterranean diet includes both food and lifestyle choices.  · Eat a variety of fresh fruits and vegetables, beans, nuts, seeds, and whole grains.  · Limit the amount of red meat and sweets that you eat.  · Talk with your health care provider about whether it is safe for you to drink red wine in moderation. This means 1 glass a day for nonpregnant women and 2 glasses a day for men. A glass of wine equals 5 oz (150 mL).  This information  is not intended to replace advice given to you by your health care provider. Make sure you discuss any questions you have with your health care provider.  Document Revised: 08/17/2017 Document Reviewed: 08/10/2017  [employer] Patient Education © 2020 [employer]    Exercise    • Less pain, better mood, and lower risk of many diseases  • Some physical activity is better than none. Try to sit less throughout the day  • Fitness is free--No equipment needed to walk, run/jog, dance, hike, Rudy Chi  • 150 minutes (2 hours and 30 minutes) to 300 minutes (5 hours) a week of moderate-intensity aerobic physical activity, or 75 minutes (1 hour and 15 minutes) to 150 minutes (2 hours and 30 minutes) a week of vigorous-intensity aerobic physical activity has substantial health benefits  • Muscle strengthening exercises 2 days per week  • Older adults should incorporate balance training   • Bones need pressure to get stronger. Weight bearing exercises include running/jogging, dancing, hiking, elliptical, treadmill, stair climbing, jumping rope, aerobics.   • Joint friendly low impact activities include walking, biking, swimming, yoga, Rudy Chi, dance. Include range of motion and stretching exercises to improve flexibility.   • https://health.gov/moveyourway

## 2021-09-21 ENCOUNTER — PATIENT MESSAGE (OUTPATIENT)
Dept: FAMILY MEDICINE CLINIC | Facility: CLINIC | Age: 61
End: 2021-09-21

## 2021-09-22 ENCOUNTER — LAB (OUTPATIENT)
Dept: LAB | Facility: HOSPITAL | Age: 61
End: 2021-09-22

## 2021-09-22 DIAGNOSIS — E78.00 PURE HYPERCHOLESTEROLEMIA: Chronic | ICD-10-CM

## 2021-09-22 LAB
ALBUMIN SERPL-MCNC: 4.7 G/DL (ref 3.5–5.2)
ALBUMIN/GLOB SERPL: 1.9 G/DL
ALP SERPL-CCNC: 64 U/L (ref 39–117)
ALT SERPL W P-5'-P-CCNC: 14 U/L (ref 1–33)
ANION GAP SERPL CALCULATED.3IONS-SCNC: 7.4 MMOL/L (ref 5–15)
AST SERPL-CCNC: 22 U/L (ref 1–32)
BILIRUB SERPL-MCNC: 0.5 MG/DL (ref 0–1.2)
BUN SERPL-MCNC: 14 MG/DL (ref 8–23)
BUN/CREAT SERPL: 15.6 (ref 7–25)
CALCIUM SPEC-SCNC: 9.7 MG/DL (ref 8.6–10.5)
CHLORIDE SERPL-SCNC: 102 MMOL/L (ref 98–107)
CHOLEST SERPL-MCNC: 159 MG/DL (ref 0–200)
CO2 SERPL-SCNC: 28.6 MMOL/L (ref 22–29)
CREAT SERPL-MCNC: 0.9 MG/DL (ref 0.57–1)
GFR SERPL CREATININE-BSD FRML MDRD: 64 ML/MIN/1.73
GLOBULIN UR ELPH-MCNC: 2.5 GM/DL
GLUCOSE SERPL-MCNC: 93 MG/DL (ref 65–99)
HDLC SERPL-MCNC: 83 MG/DL (ref 40–60)
LDLC SERPL CALC-MCNC: 62 MG/DL (ref 0–100)
LDLC/HDLC SERPL: 0.74 {RATIO}
POTASSIUM SERPL-SCNC: 5.3 MMOL/L (ref 3.5–5.2)
PROT SERPL-MCNC: 7.2 G/DL (ref 6–8.5)
SODIUM SERPL-SCNC: 138 MMOL/L (ref 136–145)
TRIGL SERPL-MCNC: 72 MG/DL (ref 0–150)
VLDLC SERPL-MCNC: 14 MG/DL (ref 5–40)

## 2021-09-22 PROCEDURE — 80053 COMPREHEN METABOLIC PANEL: CPT

## 2021-09-22 PROCEDURE — 80061 LIPID PANEL: CPT

## 2021-09-22 PROCEDURE — 36415 COLL VENOUS BLD VENIPUNCTURE: CPT

## 2021-09-24 ENCOUNTER — OFFICE VISIT (OUTPATIENT)
Dept: FAMILY MEDICINE CLINIC | Facility: CLINIC | Age: 61
End: 2021-09-24

## 2021-09-24 VITALS
DIASTOLIC BLOOD PRESSURE: 68 MMHG | SYSTOLIC BLOOD PRESSURE: 118 MMHG | WEIGHT: 156.8 LBS | RESPIRATION RATE: 16 BRPM | HEIGHT: 66 IN | BODY MASS INDEX: 25.2 KG/M2 | HEART RATE: 83 BPM | OXYGEN SATURATION: 98 %

## 2021-09-24 DIAGNOSIS — F41.1 GAD (GENERALIZED ANXIETY DISORDER): Chronic | ICD-10-CM

## 2021-09-24 DIAGNOSIS — S99.922A INJURY OF TOE ON LEFT FOOT, INITIAL ENCOUNTER: ICD-10-CM

## 2021-09-24 DIAGNOSIS — Z23 NEED FOR VACCINATION: ICD-10-CM

## 2021-09-24 DIAGNOSIS — G90.50 RSD (REFLEX SYMPATHETIC DYSTROPHY): Primary | Chronic | ICD-10-CM

## 2021-09-24 DIAGNOSIS — K14.6 BURNING MOUTH SYNDROME: ICD-10-CM

## 2021-09-24 DIAGNOSIS — E78.00 PURE HYPERCHOLESTEROLEMIA: Chronic | ICD-10-CM

## 2021-09-24 PROCEDURE — 99214 OFFICE O/P EST MOD 30 MIN: CPT | Performed by: FAMILY MEDICINE

## 2021-09-24 PROCEDURE — 90471 IMMUNIZATION ADMIN: CPT | Performed by: FAMILY MEDICINE

## 2021-09-24 PROCEDURE — 90686 IIV4 VACC NO PRSV 0.5 ML IM: CPT | Performed by: FAMILY MEDICINE

## 2021-09-24 RX ORDER — CLONAZEPAM 0.5 MG/1
0.5 TABLET ORAL DAILY
Qty: 30 TABLET | Refills: 2 | Status: SHIPPED | OUTPATIENT
Start: 2021-09-24 | End: 2021-11-18 | Stop reason: SDUPTHER

## 2021-09-24 RX ORDER — ROSUVASTATIN CALCIUM 5 MG/1
5 TABLET, COATED ORAL NIGHTLY
Qty: 30 TABLET | Refills: 11 | Status: SHIPPED | OUTPATIENT
Start: 2021-09-24 | End: 2022-07-29 | Stop reason: SDUPTHER

## 2021-09-24 RX ORDER — DULOXETIN HYDROCHLORIDE 60 MG/1
60 CAPSULE, DELAYED RELEASE ORAL DAILY
Qty: 30 CAPSULE | Refills: 5 | Status: CANCELLED | OUTPATIENT
Start: 2021-09-24

## 2021-09-24 RX ORDER — DULOXETIN HYDROCHLORIDE 30 MG/1
30 CAPSULE, DELAYED RELEASE ORAL DAILY
Qty: 30 CAPSULE | Refills: 5 | Status: SHIPPED | OUTPATIENT
Start: 2021-09-24 | End: 2022-02-21

## 2021-09-24 RX ORDER — GABAPENTIN 800 MG/1
800 TABLET ORAL 3 TIMES DAILY
Qty: 90 TABLET | Refills: 2 | Status: SHIPPED | OUTPATIENT
Start: 2021-09-24 | End: 2021-11-18 | Stop reason: SDUPTHER

## 2021-09-24 RX ORDER — GABAPENTIN 800 MG/1
800 TABLET ORAL 3 TIMES DAILY
Qty: 90 TABLET | Refills: 2 | Status: CANCELLED | OUTPATIENT
Start: 2021-09-24

## 2021-09-24 NOTE — PROGRESS NOTES
"Chief Complaint  Pain (RSD - 3 month F/U ), Anxiety (Wanting to wean off Cymbalta, wants to try 30 MG ), and Hyperlipidemia (3 month Recheck )    Subjective          Lisbeth Perdue presents to [facility]  History of Present Illness   Got shingles vaccine. Interested in flu vaccine and Covid-19 booster. 8/5/21 injured 3rd toe on left foot when her toe got twisted in her sandal. Pain has gone away but still feels the appearance looks strange. Able to walk. Has an appointment scheduled with orthopedics but is thinking about cancelling if evaluation today looks okay.     Feels no side effects from rosuvastatin and is happy with cholesterol improvement. Takes Klonipin 0.5 mg once a day and feels it keeps her burning mouth syndrome controlled. Currently not taking amitriptyline. Feels this change is tolerable. Does not want to go back on it. Urination has been better without amitriptyline. Easier to urinate. Still taking duloxetine 60 mg capsule and notes this helps her RSD but wants to consider decreasing to 30mg as mood feels blunted. Taking gabapentin 800mg 3 times per day and feels it is too much. Wants to consider lower dose. Worried about klonopin and gabapentin effect on cognitive ability.     Feels anxiety and depression is still present but manageable.     Wants to check ears routinely.     Intermittent dull pain in right side of neck over past 3 months. Denies fever or chills.         Objective   Vital Signs:   /68   Pulse 83   Resp 16   Ht 167.6 cm (66\")   Wt 71.1 kg (156 lb 12.8 oz)   SpO2 98%   BMI 25.31 kg/m²     Physical Exam  Vitals reviewed.   Constitutional:       General: She is not in acute distress.     Appearance: She is not ill-appearing.   HENT:      Right Ear: Tympanic membrane normal.      Left Ear: Tympanic membrane normal. Left ear retracted TM: Cerumen in both left and right ear.   Neck:      Thyroid: No thyromegaly or thyroid tenderness.    "   Comments: Right anterior cervical swelling.  Cardiovascular:      Rate and Rhythm: Normal rate and regular rhythm.   Pulmonary:      Effort: Pulmonary effort is normal.      Breath sounds: Normal breath sounds.   Musculoskeletal:        Feet:    Lymphadenopathy:      Head:      Right side of head: No submandibular, tonsillar, preauricular or posterior auricular adenopathy.      Left side of head: No submandibular, tonsillar, preauricular or posterior auricular adenopathy.   Neurological:      Mental Status: She is alert.           Result Review :              Immunization History   Administered Date(s) Administered   • COVID-19 (PFIZER) 01/18/2021, 02/08/2021   • FluLaval/Fluarix (VFC) >6 Months 10/17/2017, 10/09/2020, 09/24/2021   • Influenza Split Preservative Free ID 12/30/2013   • Influenza, Unspecified 10/05/2020   • Shingrix 07/06/2021, 09/07/2021   • Tdap 12/23/2011, 09/08/2017   • Zoster, Unspecified 07/06/2021   • flucelvax quad pfs =>4 YRS 10/08/2019          Assessment and Plan    Diagnoses and all orders for this visit:    1. RSD (reflex sympathetic dystrophy) (Primary)  -     gabapentin (NEURONTIN) 800 MG tablet; Take 1 tablet by mouth 3 (Three) Times a Day.  Dispense: 90 tablet; Refill: 2  The treatment plan includes a controlled substance.  Controlled Substance Medication Agreement signed and on file. Reviewed UDS.  Risks, benefits, and alternative treatments reviewed.  [facility]ICK[facility] report 09/24/21 has been reviewed.  She has been able to come off amitriptyline and is interested in further reducing her medications.  At this time Cymbalta decreased to 30 mg.  Continue gabapentin 800 mg at current dosing for now but reevaluate at next visit.   2. Burning mouth syndrome  -     clonazePAM (KlonoPIN) 0.5 MG tablet; Take 1 tablet by mouth Daily.  Dispense: 30 tablet; Refill: 2  -     gabapentin (NEURONTIN) 800 MG tablet; Take 1 tablet by mouth 3 (Three) Times a Day.  Dispense: 90 tablet; Refill: 2  She has been  able to come off amitriptyline and is interested in further reducing her medications.  At this time Cymbalta decreased to 30 mg.  Continue gabapentin 800 mg at current dosing for now but reevaluate at next visit.  Continue clonazepam  The treatment plan includes a controlled substance.  Controlled Substance Medication Agreement signed and on file. Reviewed UDS.  Risks, benefits, and alternative treatments reviewed.  [facility]ICKY report 09/24/21 has been reviewed.  3. STEPH (generalized anxiety disorder)  -     clonazePAM (KlonoPIN) 0.5 MG tablet; Take 1 tablet by mouth Daily.  Dispense: 30 tablet; Refill: 2  -     DULoxetine (CYMBALTA) 30 MG capsule; Take 1 capsule by mouth Daily.  Dispense: 30 capsule; Refill: 5  At this time decrease Cymbalta to 30 mg and monitor mood.  The treatment plan includes a controlled substance.  Controlled Substance Medication Agreement signed and on file. Reviewed UDS.  Risks, benefits, and alternative treatments reviewed.  [facility] report 09/24/21 has been reviewed.  4. Pure hypercholesterolemia  -     rosuvastatin (Crestor) 5 MG tablet; Take 1 tablet by mouth Every Night.  Dispense: 30 tablet; Refill: 11  Significant improvement with Mediterranean style diet and initiation of Crestor 5 mg with LDL goal less than 100.  5. Injury of toe on left foot, initial encounter  No abnormalities noted on exam and I do not think that she needs orthopedic consultation or further evaluation.  6. Need for vaccination  -     FluLaval/Fluarix (VFC) >6 Months          Follow Up   Return in about 8 weeks (around 11/22/2021) for Office visit chronic pain, burning mouth, anxiety .  Patient was given instructions and counseling regarding her condition or for health maintenance advice. Please see specific information pulled into the AVS if appropriate.     Yen Lara, MS3    I saw and evaluated the patient. I discussed the case with the medical student and agree with the findings and plan as documented. I personally  performed the Exam and Medical Decision Making. Critical elements of the physical exam and MDM are documented above.    Electronically signed by Payton Schofield MD, 09/24/21, 11:48 AM EDT.

## 2021-10-03 DIAGNOSIS — G90.50 RSD (REFLEX SYMPATHETIC DYSTROPHY): Chronic | ICD-10-CM

## 2021-10-03 DIAGNOSIS — K14.6 BURNING MOUTH SYNDROME: ICD-10-CM

## 2021-10-04 RX ORDER — GABAPENTIN 800 MG/1
TABLET ORAL
Qty: 90 TABLET | Refills: 2 | OUTPATIENT
Start: 2021-10-04

## 2021-10-04 NOTE — TELEPHONE ENCOUNTER
Last Office Visit: 09/24/2021  Next Office Visit: 12/28/2021        Please review pended refill request for any changes needed on refills or quantities. Thank you!

## 2021-10-05 ENCOUNTER — PATIENT MESSAGE (OUTPATIENT)
Dept: FAMILY MEDICINE CLINIC | Facility: CLINIC | Age: 61
End: 2021-10-05

## 2021-10-18 RX ORDER — DULOXETIN HYDROCHLORIDE 60 MG/1
CAPSULE, DELAYED RELEASE ORAL
Qty: 30 CAPSULE | Refills: 3 | OUTPATIENT
Start: 2021-10-18

## 2021-11-18 ENCOUNTER — OFFICE VISIT (OUTPATIENT)
Dept: FAMILY MEDICINE CLINIC | Facility: CLINIC | Age: 61
End: 2021-11-18

## 2021-11-18 VITALS
OXYGEN SATURATION: 97 % | WEIGHT: 153.6 LBS | HEIGHT: 66 IN | SYSTOLIC BLOOD PRESSURE: 118 MMHG | BODY MASS INDEX: 24.68 KG/M2 | HEART RATE: 75 BPM | DIASTOLIC BLOOD PRESSURE: 60 MMHG

## 2021-11-18 DIAGNOSIS — Z79.899 CONTROLLED SUBSTANCE AGREEMENT SIGNED: ICD-10-CM

## 2021-11-18 DIAGNOSIS — F41.1 GAD (GENERALIZED ANXIETY DISORDER): Chronic | ICD-10-CM

## 2021-11-18 DIAGNOSIS — G90.50 RSD (REFLEX SYMPATHETIC DYSTROPHY): Chronic | ICD-10-CM

## 2021-11-18 DIAGNOSIS — Z51.81 THERAPEUTIC DRUG MONITORING: ICD-10-CM

## 2021-11-18 DIAGNOSIS — K14.6 BURNING MOUTH SYNDROME: Primary | Chronic | ICD-10-CM

## 2021-11-18 PROCEDURE — 99214 OFFICE O/P EST MOD 30 MIN: CPT | Performed by: FAMILY MEDICINE

## 2021-11-18 RX ORDER — GABAPENTIN 800 MG/1
800 TABLET ORAL 3 TIMES DAILY
Qty: 90 TABLET | Refills: 0 | Status: SHIPPED | OUTPATIENT
Start: 2021-11-18 | End: 2022-07-29

## 2021-11-18 RX ORDER — CLONAZEPAM 0.5 MG/1
0.5 TABLET ORAL DAILY
Qty: 30 TABLET | Refills: 0 | Status: SHIPPED | OUTPATIENT
Start: 2021-11-18 | End: 2022-01-24

## 2021-11-18 NOTE — PROGRESS NOTES
"Chief Complaint  Pain (Pt is here for a 2 month followup.)    Subjective          Lisbeth Perdue presents to [facility]  History of Present Illness      At her last visit in September his Cymbalta was decreased to 30 mg while continuing gabapentin 800 mg and clonazepam 0.5 mg for burning mouth syndrome and reflex sympathetic dystrophy. Initially symptoms were differet but now leveled off with current dose.          Objective   Vital Signs:   /60   Pulse 75   Ht 167.6 cm (65.98\")   Wt 69.7 kg (153 lb 9.6 oz)   SpO2 97%   BMI 24.80 kg/m²     Physical Exam  Vitals reviewed.   Constitutional:       General: She is not in acute distress.     Appearance: She is not ill-appearing.   Cardiovascular:      Rate and Rhythm: Normal rate and regular rhythm.   Pulmonary:      Effort: Pulmonary effort is normal.      Breath sounds: Normal breath sounds.   Neurological:      Mental Status: She is alert.        Result Review :            CONTROLLED SUBSTANCE AGREEMENT - SCAN - IMMUN MGE PC NICHRD (12/30/2020)  LABORATORY - SCAN - URINE DRUG SCREEN MD LABS 12/30/20 (12/30/2020)       Assessment and Plan    Diagnoses and all orders for this visit:    1. Burning mouth syndrome (Primary)  -     clonazePAM (KlonoPIN) 0.5 MG tablet; Take 1 tablet by mouth Daily.  Dispense: 30 tablet; Refill: 0  -     gabapentin (NEURONTIN) 800 MG tablet; Take 1 tablet by mouth 3 (Three) Times a Day.  Dispense: 90 tablet; Refill: 0    2. RSD (reflex sympathetic dystrophy)  -     gabapentin (NEURONTIN) 800 MG tablet; Take 1 tablet by mouth 3 (Three) Times a Day.  Dispense: 90 tablet; Refill: 0    3. STEPH (generalized anxiety disorder)  -     clonazePAM (KlonoPIN) 0.5 MG tablet; Take 1 tablet by mouth Daily.  Dispense: 30 tablet; Refill: 0    4. Therapeutic drug monitoring  -     Cancel: Compliance Drug Analysis, Ur - Urine, Clean Catch; Future  -     Compliance Drug Analysis, Ur - Urine, Clean Catch; " Future    5. Controlled substance agreement signed        Continue gabapentin 800 mg 3 times a day, Cymbalta 30 mg daily, and clonazepam 0.5 mg daily.  She has appointment with physician in [place] next month and I provided refill prescription printed to help until that appointment.  Upon her return to [place] will need follow-up visit for continuing controlled prescriptions.  The treatment plan includes a controlled substance.  Controlled Substance Medication Agreement signed and on file. Reviewed UDS.  Risks, benefits, and alternative treatments reviewed.  NICKY report 11/18/21 has been reviewed.      Follow Up   Return in about 3 months (around 2/18/2022) for Office visit chronic pain.  Patient was given instructions and counseling regarding her condition or for health maintenance advice. Please see specific information pulled into the AVS if appropriate.     Electronically signed by Payton Schofield MD, 11/18/21, 9:56 AM EST.

## 2022-01-23 DIAGNOSIS — F41.1 GAD (GENERALIZED ANXIETY DISORDER): Chronic | ICD-10-CM

## 2022-01-23 DIAGNOSIS — K14.6 BURNING MOUTH SYNDROME: Chronic | ICD-10-CM

## 2022-01-24 RX ORDER — CLONAZEPAM 0.5 MG/1
0.5 TABLET ORAL DAILY
Qty: 30 TABLET | Refills: 0 | Status: SHIPPED | OUTPATIENT
Start: 2022-01-24 | End: 2022-07-29 | Stop reason: SDUPTHER

## 2022-01-24 RX ORDER — CLONAZEPAM 0.5 MG/1
TABLET ORAL
Qty: 30 TABLET | Refills: 0 | Status: SHIPPED | OUTPATIENT
Start: 2022-01-24 | End: 2022-01-24 | Stop reason: SDUPTHER

## 2022-01-24 NOTE — TELEPHONE ENCOUNTER
Rx Refill Note  Requested Prescriptions     Pending Prescriptions Disp Refills   • clonazePAM (KlonoPIN) 0.5 MG tablet [Pharmacy Med Name: CLONAZEPAM 0.5 MG TABLET] 30 tablet 0     Sig: TAKE 1 TABLET BY MOUTH EVERY DAY      Last office visit with prescribing clinician: 11/18/2021      Next office visit with prescribing clinician: 7/21/2022     Jesica Steward MA  01/24/22, 08:35 EST     Last fill: 11/18/2021

## 2022-02-21 DIAGNOSIS — F41.1 GAD (GENERALIZED ANXIETY DISORDER): Chronic | ICD-10-CM

## 2022-02-21 RX ORDER — DULOXETIN HYDROCHLORIDE 30 MG/1
CAPSULE, DELAYED RELEASE ORAL
Qty: 30 CAPSULE | Refills: 1 | Status: SHIPPED | OUTPATIENT
Start: 2022-02-21 | End: 2022-05-09

## 2022-04-11 RX ORDER — LEVOTHYROXINE SODIUM 0.03 MG/1
TABLET ORAL
Qty: 30 TABLET | Refills: 8 | Status: SHIPPED | OUTPATIENT
Start: 2022-04-11 | End: 2022-07-29 | Stop reason: SDUPTHER

## 2022-04-11 NOTE — TELEPHONE ENCOUNTER
Rx Refill Note  Requested Prescriptions     Pending Prescriptions Disp Refills   • levothyroxine (SYNTHROID, LEVOTHROID) 25 MCG tablet [Pharmacy Med Name: LEVOTHYROXINE 25 MCG TABLET] 30 tablet 8     Sig: TAKE 1 TABLET BY MOUTH EVERY DAY      Last office visit with prescribing clinician: 11/18/2021      Next office visit with prescribing clinician: 7/21/2022            Audelia Sue MA  04/11/22, 08:29 EDT

## 2022-05-08 DIAGNOSIS — F41.1 GAD (GENERALIZED ANXIETY DISORDER): Chronic | ICD-10-CM

## 2022-05-09 RX ORDER — DULOXETIN HYDROCHLORIDE 30 MG/1
CAPSULE, DELAYED RELEASE ORAL
Qty: 30 CAPSULE | Refills: 1 | Status: SHIPPED | OUTPATIENT
Start: 2022-05-09 | End: 2022-07-07

## 2022-05-09 NOTE — TELEPHONE ENCOUNTER
Rx Refill Note  Requested Prescriptions     Pending Prescriptions Disp Refills   • DULoxetine (CYMBALTA) 30 MG capsule [Pharmacy Med Name: DULOXETINE HCL DR 30 MG CAP] 30 capsule 1     Sig: TAKE 1 CAPSULE BY MOUTH EVERY DAY      Last office visit with prescribing clinician: 11/18/2021      Next office visit with prescribing clinician: 7/29/2022            Maurice Barrera MA  05/09/22, 10:02 EDT

## 2022-07-07 DIAGNOSIS — F41.1 GAD (GENERALIZED ANXIETY DISORDER): Chronic | ICD-10-CM

## 2022-07-07 RX ORDER — DULOXETIN HYDROCHLORIDE 30 MG/1
CAPSULE, DELAYED RELEASE ORAL
Qty: 30 CAPSULE | Refills: 1 | Status: SHIPPED | OUTPATIENT
Start: 2022-07-07 | End: 2022-07-29 | Stop reason: SDUPTHER

## 2022-07-07 NOTE — TELEPHONE ENCOUNTER
Rx Refill Note  Requested Prescriptions     Pending Prescriptions Disp Refills   • DULoxetine (CYMBALTA) 30 MG capsule [Pharmacy Med Name: DULOXETINE HCL DR 30 MG CAP] 30 capsule 1     Sig: TAKE 1 CAPSULE BY MOUTH EVERY DAY      Last office visit with prescribing clinician: 11/18/2021      Next office visit with prescribing clinician: 7/29/2022            Audelia Sue MA  07/07/22, 08:12 EDT

## 2022-07-24 ENCOUNTER — PATIENT MESSAGE (OUTPATIENT)
Dept: FAMILY MEDICINE CLINIC | Facility: CLINIC | Age: 62
End: 2022-07-24

## 2022-07-24 DIAGNOSIS — E03.9 ACQUIRED HYPOTHYROIDISM: ICD-10-CM

## 2022-07-24 DIAGNOSIS — E55.9 VITAMIN D DEFICIENCY: ICD-10-CM

## 2022-07-24 DIAGNOSIS — Z00.00 WELL ADULT EXAM: Primary | ICD-10-CM

## 2022-07-24 DIAGNOSIS — E78.00 PURE HYPERCHOLESTEROLEMIA: ICD-10-CM

## 2022-07-27 ENCOUNTER — LAB (OUTPATIENT)
Dept: LAB | Facility: HOSPITAL | Age: 62
End: 2022-07-27

## 2022-07-27 DIAGNOSIS — Z00.00 WELL ADULT EXAM: ICD-10-CM

## 2022-07-27 DIAGNOSIS — E55.9 VITAMIN D DEFICIENCY: ICD-10-CM

## 2022-07-27 DIAGNOSIS — E78.00 PURE HYPERCHOLESTEROLEMIA: ICD-10-CM

## 2022-07-27 DIAGNOSIS — E03.9 ACQUIRED HYPOTHYROIDISM: ICD-10-CM

## 2022-07-27 LAB
25(OH)D3 SERPL-MCNC: 47.4 NG/ML (ref 30–100)
ALBUMIN SERPL-MCNC: 4.5 G/DL (ref 3.5–5.2)
ALBUMIN/GLOB SERPL: 2.1 G/DL
ALP SERPL-CCNC: 53 U/L (ref 39–117)
ALT SERPL W P-5'-P-CCNC: 16 U/L (ref 1–33)
ANION GAP SERPL CALCULATED.3IONS-SCNC: 11 MMOL/L (ref 5–15)
AST SERPL-CCNC: 24 U/L (ref 1–32)
BILIRUB SERPL-MCNC: 0.6 MG/DL (ref 0–1.2)
BUN SERPL-MCNC: 11 MG/DL (ref 8–23)
BUN/CREAT SERPL: 14.1 (ref 7–25)
CALCIUM SPEC-SCNC: 9.7 MG/DL (ref 8.6–10.5)
CHLORIDE SERPL-SCNC: 106 MMOL/L (ref 98–107)
CHOLEST SERPL-MCNC: 152 MG/DL (ref 0–200)
CO2 SERPL-SCNC: 24 MMOL/L (ref 22–29)
CREAT SERPL-MCNC: 0.78 MG/DL (ref 0.57–1)
DEPRECATED RDW RBC AUTO: 37.9 FL (ref 37–54)
EGFRCR SERPLBLD CKD-EPI 2021: 86 ML/MIN/1.73
ERYTHROCYTE [DISTWIDTH] IN BLOOD BY AUTOMATED COUNT: 12.4 % (ref 12.3–15.4)
GLOBULIN UR ELPH-MCNC: 2.1 GM/DL
GLUCOSE SERPL-MCNC: 91 MG/DL (ref 65–99)
HCT VFR BLD AUTO: 36.7 % (ref 34–46.6)
HDLC SERPL-MCNC: 93 MG/DL (ref 40–60)
HGB BLD-MCNC: 12.5 G/DL (ref 12–15.9)
LDLC SERPL CALC-MCNC: 48 MG/DL (ref 0–100)
LDLC/HDLC SERPL: 0.53 {RATIO}
MCH RBC QN AUTO: 29.3 PG (ref 26.6–33)
MCHC RBC AUTO-ENTMCNC: 34.1 G/DL (ref 31.5–35.7)
MCV RBC AUTO: 86.2 FL (ref 79–97)
PLATELET # BLD AUTO: 248 10*3/MM3 (ref 140–450)
PMV BLD AUTO: 11.9 FL (ref 6–12)
POTASSIUM SERPL-SCNC: 4.9 MMOL/L (ref 3.5–5.2)
PROT SERPL-MCNC: 6.6 G/DL (ref 6–8.5)
RBC # BLD AUTO: 4.26 10*6/MM3 (ref 3.77–5.28)
SODIUM SERPL-SCNC: 141 MMOL/L (ref 136–145)
TRIGL SERPL-MCNC: 47 MG/DL (ref 0–150)
TSH SERPL DL<=0.05 MIU/L-ACNC: 2.03 UIU/ML (ref 0.27–4.2)
VLDLC SERPL-MCNC: 11 MG/DL (ref 5–40)
WBC NRBC COR # BLD: 4.62 10*3/MM3 (ref 3.4–10.8)

## 2022-07-27 PROCEDURE — 80050 GENERAL HEALTH PANEL: CPT

## 2022-07-27 PROCEDURE — 80061 LIPID PANEL: CPT

## 2022-07-27 PROCEDURE — 82306 VITAMIN D 25 HYDROXY: CPT

## 2022-07-27 PROCEDURE — 36415 COLL VENOUS BLD VENIPUNCTURE: CPT

## 2022-07-29 ENCOUNTER — OFFICE VISIT (OUTPATIENT)
Dept: FAMILY MEDICINE CLINIC | Facility: CLINIC | Age: 62
End: 2022-07-29

## 2022-07-29 VITALS
HEART RATE: 80 BPM | WEIGHT: 140 LBS | TEMPERATURE: 98.1 F | HEIGHT: 66 IN | BODY MASS INDEX: 22.5 KG/M2 | OXYGEN SATURATION: 97 % | DIASTOLIC BLOOD PRESSURE: 76 MMHG | SYSTOLIC BLOOD PRESSURE: 118 MMHG

## 2022-07-29 DIAGNOSIS — Z00.00 WELL ADULT EXAM: Primary | ICD-10-CM

## 2022-07-29 DIAGNOSIS — F41.1 GAD (GENERALIZED ANXIETY DISORDER): Chronic | ICD-10-CM

## 2022-07-29 DIAGNOSIS — E03.9 ACQUIRED HYPOTHYROIDISM: Chronic | ICD-10-CM

## 2022-07-29 DIAGNOSIS — E55.9 VITAMIN D DEFICIENCY: Chronic | ICD-10-CM

## 2022-07-29 DIAGNOSIS — G90.50 RSD (REFLEX SYMPATHETIC DYSTROPHY): Chronic | ICD-10-CM

## 2022-07-29 DIAGNOSIS — K14.6 BURNING MOUTH SYNDROME: Chronic | ICD-10-CM

## 2022-07-29 DIAGNOSIS — E78.00 PURE HYPERCHOLESTEROLEMIA: Chronic | ICD-10-CM

## 2022-07-29 PROCEDURE — 99396 PREV VISIT EST AGE 40-64: CPT | Performed by: FAMILY MEDICINE

## 2022-07-29 RX ORDER — DULOXETIN HYDROCHLORIDE 30 MG/1
30 CAPSULE, DELAYED RELEASE ORAL DAILY
Qty: 30 CAPSULE | Refills: 11 | Status: SHIPPED | OUTPATIENT
Start: 2022-07-29

## 2022-07-29 RX ORDER — ESTRADIOL 0.1 MG/G
2 CREAM VAGINAL DAILY
COMMUNITY

## 2022-07-29 RX ORDER — CLONAZEPAM 0.5 MG/1
0.5 TABLET ORAL DAILY
Qty: 30 TABLET | Refills: 2 | Status: SHIPPED | OUTPATIENT
Start: 2022-07-29 | End: 2022-10-28 | Stop reason: SDUPTHER

## 2022-07-29 RX ORDER — LEVOTHYROXINE SODIUM 0.03 MG/1
TABLET ORAL
Qty: 30 TABLET | Refills: 11 | Status: SHIPPED | OUTPATIENT
Start: 2022-07-29

## 2022-07-29 RX ORDER — LORATADINE 10 MG/1
CAPSULE, LIQUID FILLED ORAL
COMMUNITY

## 2022-07-29 RX ORDER — ROSUVASTATIN CALCIUM 5 MG/1
5 TABLET, COATED ORAL NIGHTLY
Qty: 30 TABLET | Refills: 11 | Status: SHIPPED | OUTPATIENT
Start: 2022-07-29

## 2022-07-29 RX ORDER — UBIDECARENONE 75 MG
50 CAPSULE ORAL DAILY
COMMUNITY

## 2022-07-29 NOTE — PROGRESS NOTES
"Chief Complaint  Annual Exam and Med Refill    Subjective          Lisbeth Perdue presents to [facility] for   History of Present Illness     Losing weight with changes to eating habits. She used to have work stress and eating but retired March 2021. She has decreased portion sizes and snacking. She stopped amitriptyline and it used to make her crave sweets. Stopped gabapentin over the winter. Her appetite and cravings are less. Goal weight 130s. Weight in middle section. Often not eating until noon other than black coffee. She has gym membership. Started increased walking to 30 minutes on track.     Travels to [place] around November and return in April.    In [place] she had vulvar inflammation. Diagnosed with yeast infection by GYN. She was told urethra was too big and treated with estradiol cream. Advised Kegel exercises.     Ophthalmology exam for flashing light and floaters. No retinal tears. Next eye exam in December.         Objective   Vital Signs:   Vitals:    07/29/22 1035   BP: 118/76   BP Location: Right arm   Patient Position: Sitting   Cuff Size: Adult   Pulse: 80   Temp: 98.1 °F (36.7 °C)   SpO2: 97%   Weight: 63.5 kg (140 lb)   Height: 167.6 cm (65.98\")   PainSc: 0-No pain     Body mass index is 22.61 kg/m².    BMI is within normal parameters. No other follow-up for BMI required.          Physical Exam  Constitutional:       General: She is not in acute distress.  HENT:      Right Ear: Tympanic membrane and ear canal normal.      Left Ear: Tympanic membrane and ear canal normal.   Eyes:      General:         Right eye: No discharge.         Left eye: No discharge.      Conjunctiva/sclera: Conjunctivae normal.   Neck:      Thyroid: No thyromegaly.   Cardiovascular:      Rate and Rhythm: Normal rate and regular rhythm.   Pulmonary:      Effort: Pulmonary effort is normal.      Breath sounds: Normal breath sounds.   Abdominal:      Palpations: Abdomen is soft. " There is no hepatomegaly.      Tenderness: There is no abdominal tenderness.   Musculoskeletal:      Cervical back: Neck supple.   Lymphadenopathy:      Head:      Right side of head: No submandibular, preauricular or posterior auricular adenopathy.      Left side of head: No submandibular, preauricular or posterior auricular adenopathy.      Cervical: No cervical adenopathy.   Skin:     General: Skin is warm.   Neurological:      Mental Status: She is alert and oriented to person, place, and time.   Psychiatric:         Mood and Affect: Mood normal.         Behavior: Behavior normal.         Thought Content: Thought content normal.         Judgment: Judgment normal.        Result Review :   The following data was reviewed by: Payton Schofield MD on 07/29/2022:  Common labs    Common Labsle 9/22/21 9/22/21 7/27/22 7/27/22 7/27/22    0942 0942 0923 0923 0923   Glucose  93  91    BUN  14  11    Creatinine  0.90  0.78    eGFR Non African Am  64      Sodium  138  141    Potassium  5.3 (A)  4.9    Chloride  102  106    Calcium  9.7  9.7    Albumin  4.70  4.50    Total Bilirubin  0.5  0.6    Alkaline Phosphatase  64  53    AST (SGOT)  22  24    ALT (SGPT)  14  16    WBC   4.62     Hemoglobin   12.5     Hematocrit   36.7     Platelets   248     Total Cholesterol 159    152   Triglycerides 72    47   HDL Cholesterol 83 (A)    93 (A)   LDL Cholesterol  62    48   (A) Abnormal value          TSH Rfx On Abnormal To Free T4 (07/27/2022 09:23)  Vitamin D 25 Hydroxy (07/27/2022 09:23)             Immunization History   Administered Date(s) Administered   • COVID-19 (PFIZER) PURPLE CAP 01/18/2021, 02/08/2021, 11/02/2021, 07/15/2022   • FluLaval/Fluarix/Fluzone >6 10/17/2017, 10/09/2020, 09/24/2021   • Influenza Split Preservative Free ID 12/30/2013   • Influenza, Unspecified 10/05/2020   • Shingrix 07/06/2021, 09/07/2021   • Tdap 12/23/2011, 09/08/2017   • Zoster, Unspecified 07/06/2021   • flucelvax quad pfs =>4 YRS  10/08/2019       Health Maintenance   Topic Date Due   • INFLUENZA VACCINE  10/01/2022   • MAMMOGRAM  11/05/2022   • DXA SCAN  12/07/2022   • LIPID PANEL  07/27/2023   • TDAP/TD VACCINES (3 - Td or Tdap) 09/08/2027   • ZOSTER VACCINE  Completed   • PAP SMEAR  Discontinued            Assessment and Plan    Diagnoses and all orders for this visit:    1. Well adult exam (Primary)  Reviewed previsit labs with patient in detail.  Colon cancer screenings up-to-date.  Breast cancer screening is up-to-date.  Recommend follow-up bone density screening at 2 to 3-year interval.  Immunizations current.  2. Pure hypercholesterolemia  -     rosuvastatin (Crestor) 5 MG tablet; Take 1 tablet by mouth Every Night.  Dispense: 30 tablet; Refill: 11  Dramatic improvement in LDL cholesterol.  At goal less than 100.  Continue Crestor.  3. Acquired hypothyroidism  -     levothyroxine (SYNTHROID, LEVOTHROID) 25 MCG tablet; TAKE 1 TABLET BY MOUTH EVERY DAY  Dispense: 30 tablet; Refill: 11  Thyroid function in normal range continue current dose.  4. RSD (reflex sympathetic dystrophy)  She has been able to discontinue amitriptyline and gabapentin.  5. Burning mouth syndrome  -     clonazePAM (KlonoPIN) 0.5 MG tablet; Take 1 tablet by mouth Daily.  Dispense: 30 tablet; Refill: 2  Continue clonazepam.  CSA and UDS up-to-date.  6. STEPH (generalized anxiety disorder)  -     clonazePAM (KlonoPIN) 0.5 MG tablet; Take 1 tablet by mouth Daily.  Dispense: 30 tablet; Refill: 2  -     DULoxetine (CYMBALTA) 30 MG capsule; Take 1 capsule by mouth Daily.  Dispense: 30 capsule; Refill: 11  Continue duloxetine as well as clonazepam.  7. Vitamin D deficiency  Currently improved with supplementation      Counseled on health maintenance topics and preventative care recommendations: Diet, exercise, colon cancer screening, breast cancer screening, bone density testing      Follow Up   Return in about 3 months (around 10/29/2022) for Office visit RSD burning mouth   and , Physical and fasting labs 1 years.  Patient was given instructions and counseling regarding her condition or for health maintenance advice. Please see specific information pulled into the AVS if appropriate.      Electronically signed by Payton Schofield MD, 07/29/22, 12:20 PM EDT.

## 2022-08-10 ENCOUNTER — OFFICE VISIT (OUTPATIENT)
Dept: FAMILY MEDICINE CLINIC | Facility: CLINIC | Age: 62
End: 2022-08-10

## 2022-08-10 VITALS
SYSTOLIC BLOOD PRESSURE: 120 MMHG | TEMPERATURE: 97.8 F | DIASTOLIC BLOOD PRESSURE: 78 MMHG | HEART RATE: 87 BPM | BODY MASS INDEX: 22.66 KG/M2 | OXYGEN SATURATION: 99 % | HEIGHT: 66 IN | WEIGHT: 141 LBS

## 2022-08-10 DIAGNOSIS — R22.0 SWELLING OF UPPER LIP: Primary | ICD-10-CM

## 2022-08-10 PROCEDURE — 99213 OFFICE O/P EST LOW 20 MIN: CPT | Performed by: NURSE PRACTITIONER

## 2022-08-10 NOTE — PROGRESS NOTES
"Chief Complaint  Rash (Pt states she's had a rash on the inside of her lip for about a week and a half. She states it does hurt and has noticed some swelling and redness./)    Subjective        Lisbeth Perdue presents to [facility]  Pt is here today for irritation on inside of lips. She reports it started on bottom lip. Bottom lip improved, but the top lip then became irritated. Symptoms initially started about a week and a half ago. Pt reports that she has been drinking more coca-cola than usual, but other than that no diet changes. Irritation has improved this morning. Pt reports lip was swollen yesterday, but is not today. We discussed keeping a log of what she has eaten if this occurs again. Advised pt to take antihistamines if this occurs.       Objective   Vital Signs:  /78   Pulse 87   Temp 97.8 °F (36.6 °C)   Ht 167.6 cm (65.98\")   Wt 64 kg (141 lb)   SpO2 99%   BMI 22.77 kg/m²   Estimated body mass index is 22.77 kg/m² as calculated from the following:    Height as of this encounter: 167.6 cm (65.98\").    Weight as of this encounter: 64 kg (141 lb).    BMI is within normal parameters. No other follow-up for BMI required.      Physical Exam  Vitals reviewed.   Constitutional:       Appearance: Normal appearance.   HENT:      Nose: Nose normal.      Mouth/Throat:      Lips: Pink.      Mouth: Mucous membranes are moist.   Eyes:      Conjunctiva/sclera: Conjunctivae normal.   Cardiovascular:      Rate and Rhythm: Normal rate and regular rhythm.      Pulses: Normal pulses.      Heart sounds: Normal heart sounds.   Pulmonary:      Effort: Pulmonary effort is normal.      Breath sounds: Normal breath sounds.   Musculoskeletal:         General: Normal range of motion.   Skin:     General: Skin is warm.   Neurological:      Mental Status: She is alert and oriented to person, place, and time.   Psychiatric:         Mood and Affect: Mood normal.         Behavior: " Behavior normal.         Thought Content: Thought content normal.        Result Review :                Assessment and Plan   Diagnoses and all orders for this visit:    1. Swelling of upper lip (Primary)     - Antihistamine for symptoms if they occur again; return if symptoms worsen; keep a food/exposure log to identify triggers.          Follow Up   No follow-ups on file.  Patient was given instructions and counseling regarding her condition or for health maintenance advice. Please see specific information pulled into the AVS if appropriate.

## 2022-10-28 ENCOUNTER — OFFICE VISIT (OUTPATIENT)
Dept: FAMILY MEDICINE CLINIC | Facility: CLINIC | Age: 62
End: 2022-10-28

## 2022-10-28 VITALS
WEIGHT: 145.4 LBS | HEART RATE: 73 BPM | DIASTOLIC BLOOD PRESSURE: 72 MMHG | OXYGEN SATURATION: 99 % | SYSTOLIC BLOOD PRESSURE: 112 MMHG | BODY MASS INDEX: 23.37 KG/M2 | HEIGHT: 66 IN

## 2022-10-28 DIAGNOSIS — R22.0 LIP SWELLING: ICD-10-CM

## 2022-10-28 DIAGNOSIS — Z51.81 THERAPEUTIC DRUG MONITORING: ICD-10-CM

## 2022-10-28 DIAGNOSIS — Z79.899 CONTROLLED SUBSTANCE AGREEMENT SIGNED: ICD-10-CM

## 2022-10-28 DIAGNOSIS — F41.1 GAD (GENERALIZED ANXIETY DISORDER): Chronic | ICD-10-CM

## 2022-10-28 DIAGNOSIS — K14.6 BURNING MOUTH SYNDROME: Chronic | ICD-10-CM

## 2022-10-28 DIAGNOSIS — G90.50 RSD (REFLEX SYMPATHETIC DYSTROPHY): Primary | Chronic | ICD-10-CM

## 2022-10-28 DIAGNOSIS — G90.50 RSD (REFLEX SYMPATHETIC DYSTROPHY): Chronic | ICD-10-CM

## 2022-10-28 PROCEDURE — 99214 OFFICE O/P EST MOD 30 MIN: CPT | Performed by: FAMILY MEDICINE

## 2022-10-28 RX ORDER — CLONAZEPAM 0.5 MG/1
0.5 TABLET ORAL DAILY
Qty: 30 TABLET | Refills: 2 | Status: SHIPPED | OUTPATIENT
Start: 2022-10-28

## 2022-10-28 NOTE — PROGRESS NOTES
"Chief Complaint  burning of the mouth    Subjective        Lisbeth Perdue presents to [facility]  History of Present Illness     She has irritation on inside of top and bottom lips. Next dental exam in December. Waxing on waning. She notices white on the bottom. Lip feels swollen and could be abnormal veins. No fever blisters or cold sores. She takes claritin daily for allergies.     She saw GYN this week and recommended estrogen patch, pelvic floor PT.             Objective   Vital Signs:  /72   Pulse 73   Ht 167.6 cm (65.98\")   Wt 66 kg (145 lb 6.4 oz)   SpO2 99%   BMI 23.48 kg/m²   Estimated body mass index is 23.48 kg/m² as calculated from the following:    Height as of this encounter: 167.6 cm (65.98\").    Weight as of this encounter: 66 kg (145 lb 6.4 oz).    BMI is within normal parameters. No other follow-up for BMI required.      Physical Exam  Vitals reviewed.   Constitutional:       General: She is not in acute distress.     Appearance: She is not ill-appearing.   HENT:      Mouth/Throat:      Lips: Pink. No lesions.      Mouth: Mucous membranes are moist. No oral lesions or angioedema.      Dentition: No gingival swelling or gum lesions.      Tongue: No lesions.      Palate: No lesions.      Comments: No lip swelling or lesions  Cardiovascular:      Rate and Rhythm: Normal rate and regular rhythm.   Pulmonary:      Effort: Pulmonary effort is normal.      Breath sounds: Normal breath sounds.   Neurological:      Mental Status: She is alert.        Result Review :                Assessment and Plan   Diagnoses and all orders for this visit:    1. RSD (reflex sympathetic dystrophy) (Primary)  -     Cancel: Compliance Drug Analysis, Ur - Urine, Clean Catch; Future  -     Drug Analysis,Comp,Oral Fluid - Saliva,; Future    2. Burning mouth syndrome  -     Cancel: Compliance Drug Analysis, Ur - Urine, Clean Catch; Future  -     Drug Analysis,Comp,Oral Fluid - " Saliva,; Future  -     clonazePAM (KlonoPIN) 0.5 MG tablet; Take 1 tablet by mouth Daily.  Dispense: 30 tablet; Refill: 2    3. STEPH (generalized anxiety disorder)  -     clonazePAM (KlonoPIN) 0.5 MG tablet; Take 1 tablet by mouth Daily.  Dispense: 30 tablet; Refill: 2    4. Lip swelling    5. Therapeutic drug monitoring  -     Cancel: Compliance Drug Analysis, Ur - Urine, Clean Catch; Future  -     Drug Analysis,Comp,Oral Fluid - Saliva,; Future    6. Controlled substance agreement signed      Unable to void for urine drug screening today and oral drug screen collected instead.  Shiv report reviewed.  Updated controlled substance agreement.  Continue combination of clonazepam and Cymbalta.  For recent lip symptoms recommend continuing on Claritin but also consider taking Benadryl at bedtime.  Cautioned on sedation.  Also encouraged her to take a food journal to see if there may be specific dietary triggers.         Follow Up   Return in about 3 months (around 1/28/2023) for Office visit RSD/burning mouth .  Patient was given instructions and counseling regarding her condition or for health maintenance advice. Please see specific information pulled into the AVS if appropriate.     Electronically signed by Payton Carey MD, 10/28/22, 4:08 PM EDT.

## 2022-11-01 ENCOUNTER — TRANSCRIBE ORDERS (OUTPATIENT)
Dept: PHYSICAL THERAPY | Facility: CLINIC | Age: 62
End: 2022-11-01

## 2022-11-01 ENCOUNTER — TREATMENT (OUTPATIENT)
Dept: PHYSICAL THERAPY | Facility: CLINIC | Age: 62
End: 2022-11-01

## 2022-11-01 DIAGNOSIS — M62.89 PELVIC FLOOR DYSFUNCTION: Primary | ICD-10-CM

## 2022-11-01 DIAGNOSIS — M62.81 MUSCLE WEAKNESS: ICD-10-CM

## 2022-11-01 DIAGNOSIS — N81.9 FEMALE GENITAL PROLAPSE, UNSPECIFIED TYPE: ICD-10-CM

## 2022-11-01 PROCEDURE — 97162 PT EVAL MOD COMPLEX 30 MIN: CPT | Performed by: PHYSICAL THERAPIST

## 2022-11-01 PROCEDURE — 97110 THERAPEUTIC EXERCISES: CPT | Performed by: PHYSICAL THERAPIST

## 2022-11-01 NOTE — PROGRESS NOTES
Physical Therapy Initial Evaluation and Plan of Care    Patient: Lisbeth Perdue   : 1960  Diagnosis/ICD-10 Code:  Pelvic floor dysfunction [M62.89]  Referring practitioner: Rosey Cantu, *  Date of Initial Visit: 2022  Today's Date: 11/3/2022  Patient seen for 1 sessions      Subjective  Been having issues for a couple of years. Finally decided to see GYN. Feels can never really fully empty her bladder. Said she does have dropping bladder. She talked to her about estrogen and she went off the patch at age 60. She put her back on the patch and doing Estrogen cream vaginally. Decided she would rather do pelvic PT than do surgery. Big proponent of PT because has RSD and CRPS from bunionectomy and was cured.     Chief Complaint:   Chief Complaint   Patient presents with   • Initial Evaluation      Functional Outcome Measure: PFDI-20 score: 163.6    Pain  Denies issues    Bladder function:    Incontinence: with urge sometimes; not often  How soaked is pad when changed: not using  Leak at night: no  Urination at night: every 2-3 hours  Use bathroom “just in case”: no  Strong urinary urge: sometimes  Complete bladder voiding: feels like takes longer to get everything out  Urinary splaying: no  Post-micturition dribble: yes  Frequency of urination: every 2 hours or more   Discomfort with urination: no  Fluid consumed daily: 2 16 oz bottles of water; coffee in AM; sometimes mini Coke      Bowel function:  Straining and constipated her whole life; takes a Stool softener daily; takes Citracel  How many episodes of leakage/month: n/a  How many BM's/day: every 2 days  [place] Stool Scale Type: 1  Discomfort with BM: not typically    Sexual activity  Sexually active: yes having pain - can't do it because it is too painful - previously had this issue then when on patch it was better      Diagnostics:    PMH:   Past Medical History:   Diagnosis Date   • Anemia    • Burning mouth syndrome    • CRPS  (complex regional pain syndrome)     lower limb   • Hyperlipidemia    • Hypothyroidism    • IBS (irritable bowel syndrome)    • Nevus, atypical    • Osteoarthrosis involving lower leg    • Osteopenia    • Pain in joint, lower leg    • RSD lower limb    • Shingles    • Vitamin D deficiency         Surgical HX:   Past Surgical History:   Procedure Laterality Date   • BUNIONECTOMY     • COLONOSCOPY  10/2016    DR GORE   • FOOT SURGERY Right    • FRACTURE SURGERY     • HYSTERECTOMY     • TONSILLECTOMY          Menstrual cycle: n/a    Birth HX:  - vaginal deliveries     Meds:   Current Outpatient Medications:   •  ascorbic acid (VITAMIN C) 1000 MG tablet, Take  by mouth daily., Disp: , Rfl:   •  Cholecalciferol (VITAMIN D) 2000 UNITS tablet, Take  by mouth., Disp: , Rfl:   •  clonazePAM (KlonoPIN) 0.5 MG tablet, Take 1 tablet by mouth Daily., Disp: 30 tablet, Rfl: 2  •  Cranberry 250 MG tablet, Take 2 tablets by mouth Daily., Disp: , Rfl:   •  D-Mannose 500 MG capsule, Take 3 capsules by mouth Daily., Disp: , Rfl:   •  DULoxetine (CYMBALTA) 30 MG capsule, Take 1 capsule by mouth Daily., Disp: 30 capsule, Rfl: 11  •  estradiol (ESTRACE) 0.1 MG/GM vaginal cream, Insert 2 g into the vagina Daily., Disp: , Rfl:   •  levothyroxine (SYNTHROID, LEVOTHROID) 25 MCG tablet, TAKE 1 TABLET BY MOUTH EVERY DAY, Disp: 30 tablet, Rfl: 11  •  Loratadine 10 MG capsule, Take  by mouth., Disp: , Rfl:   •  naproxen sodium (ALEVE) 220 MG tablet, Take 220 mg by mouth As Needed., Disp: , Rfl:   •  Omega-3 Fatty Acids (FISH OIL) 1000 MG capsule capsule, Take  by mouth daily., Disp: , Rfl:   •  Omeprazole 20 MG tablet delayed-release, , Disp: , Rfl:   •  Probiotic Product (PROBIOTIC DAILY PO), Take  by mouth., Disp: , Rfl:   •  rosuvastatin (Crestor) 5 MG tablet, Take 1 tablet by mouth Every Night., Disp: 30 tablet, Rfl: 11  •  vitamin B-12 (CYANOCOBALAMIN) 100 MCG tablet, Take 50 mcg by mouth Daily., Disp: , Rfl:      Occupation:  retired    Activity level/exercise routine: not regularly - got Fit Bit and trying to track steps        Objective    Patient independently ambulates into clinic.        Verbal consent obtained for internal pelvic exam/treatment with declined need for second person in room  Posture: forward head and shoulders  Palpation:  Supine:   Abdominals: unremarkable for tension B  JUDAH: unremarkable  Iliacus : unremarkable for tension B  Psoas :  unremarkable for tension B  Adductors unremarkable for tension B  Seated LE MMT: grossly 4+/5 B  ASLR: negative        PELVIC FLOOR   External:               Sensation: intact B              Skin quality: no gross abnormalities              Ischiocavernosus: unremarkable for tension B              Supf and deep transverse perineal muscle: unremarkable for tension B                   Internal:   Sensation: intact B  Bulge: intact  Knack: absent  Wall Laxity: moderate anterior                 Internal superficial PFM: unremarkable for tension B, no TTP              Levator ani: unremarkable for tension B, no TTP              Obturator internus: unremarkable for tension B              Compressor urethra: unremarkable for tension B  PERF SCALE:  Power: 2-    Endurance: 4    Repetitions: 4    Fast twitch: 5        See flowsheet for details of treatment following evaluation.    Basic information was provided regarding pelvic floor anatomy, explanation of the function of the pelvic floor and contribution to symptoms.     Assessment/Plan:     Assessment/Plan    The patient is a 62 y.o. female who presents to physical therapy today for difficulty voiding bladder. . Upon initial evaluation, the patient demonstrates the following impairments: decreased strength and endurance of PFM with moderate anterior vaginal wall laxity. Due to these impairments, the patient has urinary frequency with incomplete bladder emptying that affects social activities, travel and sleep. The patient would benefit from  skilled pelvic PT services to address functional limitations and impairments and to improve patient quality of life. Pt has challenges with hormonal depletion that also are contributing factor to symptoms.      Goals:   STG's: 4 weeks  • Patient will report voiding no more than every 2 hours for improved function with travel.   • Patient will report > 25% improvement in urinary symptoms for improved QOL  • Patient will be able to perform HEP with minimal verbal cues     LTG's: By discharge  • Patient will report >75% improvement in urinary symptoms   • Patient functional outcome measure test, PFDI-20 improved score by >75 %  • Patient will report voiding no more than every 3 hours for improved function with travel.   • Patient will be wake no more than 1x/ night to void for improved sleep.  • Patient will be independent with HEP     Plan  Therapy options: will be seen for skilled physical therapy services  Planned modality interventions: TENS, ultrasound, cryotherapy, thermotherapy (hydrocollator packs)  Planned therapy interventions: abdominal trunk stabilization, manual therapy, neuromuscular re-education, body mechanics training, flexibility, functional ROM exercises, gait training, home exercise program, joint mobilization, therapeutic activities, stretching, strengthening, spinal/joint mobilization, soft tissue mobilization and postural training, dry needling  Pt prognosis: good  Frequency: weekly  Duration in visits: 7  Duration in weeks: 12  Treatment plan discussed with: patient    Treatment Time: 1453 - 1530  Timed:         Manual Therapy:    0     mins  98016;     Therapeutic Exercise:    8     mins  68915;     Neuromuscular Fermín:    0    mins  65893;    Therapeutic Activity:     0     mins  43131;     Gait Trainin     mins  07556;     Ultrasound:     0     mins  73141;    Ionto                               0    mins   47927  Self Care                       0     mins   20359  Canalith Repos    0      mins 32472      Un-Timed:  Electrical Stimulation:    0     mins  44028 ( );  Dry Needling     0     mins self-pay  Traction     0     mins 53441  Low Eval     0     Mins  82693  Mod Eval     29     Mins  88459  High Eval                       0     Mins  61888  Re-Eval                           0    mins  49937        Timed Treatment:   8   mins   Total Treatment:       mins    PT SIGNATURE:   ASHLY Duong License # [id number]    DATE TREATMENT INITIATED: 11/1/2022    Initial Certification  Certification Period: 2/1/2023  I certify that the therapy services are furnished while this patient is under my care.  The services outlined above are required by this patient, and will be reviewed every 90 days.       PHYSICIAN: Rosey Cantu APRN  NPI: [id number]                                           DATE: 11/01/2022     Please sign and return via fax to [phone]. Thank you, [facility].

## 2022-11-07 ENCOUNTER — TREATMENT (OUTPATIENT)
Dept: PHYSICAL THERAPY | Facility: CLINIC | Age: 62
End: 2022-11-07

## 2022-11-07 DIAGNOSIS — M62.89 PELVIC FLOOR DYSFUNCTION: Primary | ICD-10-CM

## 2022-11-07 DIAGNOSIS — N81.9 FEMALE GENITAL PROLAPSE, UNSPECIFIED TYPE: ICD-10-CM

## 2022-11-07 DIAGNOSIS — M62.81 MUSCLE WEAKNESS: ICD-10-CM

## 2022-11-07 PROCEDURE — 97112 NEUROMUSCULAR REEDUCATION: CPT | Performed by: PHYSICAL THERAPIST

## 2022-11-07 PROCEDURE — 97110 THERAPEUTIC EXERCISES: CPT | Performed by: PHYSICAL THERAPIST

## 2022-11-07 NOTE — PROGRESS NOTES
Physical Therapy Daily Treatment Note  Patient: Lisbeth Perdue   : 1960  Diagnosis/ICD-10 Code:  Pelvic floor dysfunction [M62.89]  Referring practitioner: Rosey Cantu, *  Date of Initial Visit: Type: THERAPY  Noted: 2022  Today's Date: 2022  Patient seen for 2 sessions      Subjective   Lisbeth Perdue reports doing her HEP daily and thinks doing it right but unsure.   Pain Rating (0-10): 0      Objective   verbal consent obtained for external pelvic exam/treatment with declined need for second person in room  SEMg supine rest 3.2, avg 12.5, max 27.0     See Exercise, Manual, and Modality Logs for complete treatment.       Assessment/Plan  Patient did well with SEMG for visual feedback today.  She required verbal cueing to decrease gluteal substitution as pelvic floor muscles fatigued.  HEP was advanced to include resistance in seated and to further endurance training.  We will continue to progress strengthening as indicated to decrease symptoms.    Progress per Plan of Care         1518/1600   Timed:         Manual Therapy:    0     mins  55188;     Therapeutic Exercise:    8     mins  09454;     Neuromuscular Fermín:    34    mins  77883;    Therapeutic Activity:     0     mins  32938;     Gait Trainin     mins  52702;     Ultrasound:     0     mins  22668;    Ionto                               0    mins   59296  Self Care                       0     mins   14668  Canalith Repos               0    mins  81181    Un-Timed:  Electrical Stimulation:    0     mins  78745 ( );  Dry Needling     0     mins self-pay  Traction     0     mins 81833  Low Eval     0     Mins  82383  Mod Eval     0     Mins  36973  High Eval                       0     Mins  65242  Re-Eval                           0    mins  52483    Timed Treatment:   42   mins   Total Treatment:     42   mins    Salbador Mobley PT  [place] License # [id number]  Physical Therapist

## 2022-11-12 LAB
6MAM SAL CFM-MCNC: NOT DETECTED NG/ML
6MAM SAL QL CFM: NEGATIVE
ALPRAZ SAL CFM-MCNC: NOT DETECTED NG/ML
AMPHET SAL CFM-MCNC: NOT DETECTED NG/ML
AMPHET SAL QL CFM: NEGATIVE
ANTICONVULSANTS SAL QL CFM: NEGATIVE
BENZODIAZ SAL QL CFM: NEGATIVE
BUPRENORPHINE SAL CFM-MCNC: NOT DETECTED NG/ML
BUPRENORPHINE SAL QL CFM: NEGATIVE
BZE SAL CFM-MCNC: NOT DETECTED NG/ML
CANNABINOIDS SAL QL SCN: NEGATIVE
CARBOXYTHC SAL CFM-MCNC: NOT DETECTED NG/ML
CARISOPRODOL SAL CFM-MCNC: NOT DETECTED NG/ML
CLONAZEPAM SAL CFM-MCNC: NOT DETECTED NG/ML
COC+MET SAL QL CFM: NEGATIVE
COCAINE SAL CFM-MCNC: NOT DETECTED NG/ML
CODEINE SAL CFM-MCNC: NOT DETECTED NG/ML
COTININE SAL CFM-MCNC: NOT DETECTED NG/ML
COTININE SAL QL CFM: NEGATIVE
CYCLOBENZAPRINE SAL CFM-MCNC: NOT DETECTED NG/ML
DHC SAL CFM-MCNC: NOT DETECTED NG/ML
DIAZEPAM SAL CFM-MCNC: NOT DETECTED NG/ML
DULOXETINE SAL CFM-MCNC: NOT DETECTED NG/ML
DULOXETINE SAL QL CFM: NEGATIVE
EDDP SAL QL CFM: NOT DETECTED NG/ML
FENTANYL SAL CFM-MCNC: NEGATIVE NG/ML
FENTANYL SAL QL SCN: NOT DETECTED NG/ML
FLUNITRAZEPAM SAL CFM-MCNC: NOT DETECTED NG/ML
FLURAZEPAM SAL CFM-MCNC: NOT DETECTED NG/ML
GABAPENTIN SAL CFM-MCNC: NOT DETECTED UG/ML
HYDROCODONE SAL CFM-MCNC: NOT DETECTED NG/ML
HYDROMORPHONE SAL CFM-MCNC: NOT DETECTED NG/ML
HYPNOTICS SAL QL CFM: NEGATIVE
LORAZEPAM SAL-MCNC: NOT DETECTED NG/ML
MDMA SAL CFM-MCNC: NOT DETECTED NG/ML
MEPERIDINE SAL CFM-MCNC: NOT DETECTED NG/ML
MEPROBAMATE SAL CFM-MCNC: NOT DETECTED NG/ML
METHADONE SAL CFM-MCNC: NOT DETECTED NG/ML
METHADONE SAL QL CFM: NEGATIVE
METHAMPHET SAL CFM-MCNC: NOT DETECTED NG/ML
MIDAZOLAM SAL CFM-MCNC: NOT DETECTED NG/ML
MORPHINE SAL CFM-MCNC: NOT DETECTED NG/ML
MUSCLE RELAXANTS: NEGATIVE
NARCOTICS SAL QL CFM: NEGATIVE
NORBUPRENORPHINE SAL CFM-MCNC: NOT DETECTED NG/ML
NORDIAZEPAM SAL-MCNC: NOT DETECTED NG/ML
NORHYDROCODONE SAL CFM-MCNC: NOT DETECTED NG/ML
NOROXYCODONE SAL CFM-MCNC: NOT DETECTED NG/ML
OPIATES SAL QL CFM: NEGATIVE
OXAZEPAM SAL CFM-MCNC: NOT DETECTED NG/ML
OXYCODONE SAL CFM-MCNC: NOT DETECTED NG/ML
OXYCODONE SAL QL CFM: NEGATIVE
OXYMORPHONE SAL CFM-MCNC: NOT DETECTED NG/ML
PCP SAL CFM-MCNC: NOT DETECTED NG/ML
PCP SAL QL CFM: NEGATIVE
PREGABALIN SAL CFM-MCNC: NOT DETECTED UG/ML
PROPOXYPH SAL CFM-MCNC: NOT DETECTED NG/ML
TAPENTADOL SAL CFM-MCNC: NOT DETECTED NG/ML
TAPENTADOL SAL QL SCN: NEGATIVE
TEMAZEPAM SAL CFM-MCNC: NOT DETECTED NG/ML
TRAMADOL SAL CFM-MCNC: NOT DETECTED NG/ML
TRIAZOLAM SAL CFM-MCNC: NOT DETECTED NG/ML
ZOLPIDEM SAL CFM-MCNC: NOT DETECTED NG/ML

## 2022-11-14 ENCOUNTER — TREATMENT (OUTPATIENT)
Dept: PHYSICAL THERAPY | Facility: CLINIC | Age: 62
End: 2022-11-14

## 2022-11-14 DIAGNOSIS — N81.9 FEMALE GENITAL PROLAPSE, UNSPECIFIED TYPE: ICD-10-CM

## 2022-11-14 DIAGNOSIS — M62.81 MUSCLE WEAKNESS: ICD-10-CM

## 2022-11-14 DIAGNOSIS — M62.89 PELVIC FLOOR DYSFUNCTION: Primary | ICD-10-CM

## 2022-11-14 PROCEDURE — 97112 NEUROMUSCULAR REEDUCATION: CPT | Performed by: PHYSICAL THERAPIST

## 2022-11-14 PROCEDURE — 97110 THERAPEUTIC EXERCISES: CPT | Performed by: PHYSICAL THERAPIST

## 2022-11-14 NOTE — PROGRESS NOTES
Physical Therapy Daily Treatment Note  PHYSICAL THERAPY    [address], [address]; [place] [address]  Patient: Lisbeth Perdue   : 1960  Diagnosis/ICD-10 Code:  Pelvic floor dysfunction [M62.89]  Referring practitioner: Rosey Cantu, *  Date of Initial Visit: Type: THERAPY  Noted: 2022  Today's Date: 2022  Patient seen for 3 sessions      Subjective   Lisbeth Perdue reports doing HEP daily.   Pain Rating (0-10): 0      Objective   verbal consent obtained for external pelvic exam/treatment with declined need for second person in room     SEMg supine rest 1.8, avg 14.0, max  23    See Exercise, Manual, and Modality Logs for complete treatment.       Assessment/Plan  Patient is compliant with HEP and demonstrates improving endurance with SEMG today.  HEP was progressed to include resistance and endurance training in seated.  We will continue to progress strengthening as indicated to decrease prolapse and bladder symptoms.  Patient will be seen back in 4 weeks as [place].    Progress per Plan of Care         1110/1150   Timed:         Manual Therapy:    0     mins  64560;     Therapeutic Exercise:    8     mins  49453;     Neuromuscular Fermín:    32    mins  30238;    Therapeutic Activity:     0     mins  47735;     Gait Trainin     mins  56391;     Ultrasound:     0     mins  43058;    Ionto                               0    mins   67083  Self Care                       0     mins   94307  Canalith Repos               0    mins  36814    Un-Timed:  Electrical Stimulation:    0     mins  19822 ( );  Dry Needling     0     mins self-pay  Traction     0     mins 37194  Low Eval     0     Mins  34968  Mod Eval     0     Mins  69854  High Eval                       0     Mins  92342  Re-Eval                           0    mins  44585    Timed Treatment:   40   mins   Total Treatment:     40   mins    Salbador Mobley, PT  [place] License #  [id number]  Physical Therapist

## 2022-12-19 ENCOUNTER — HOSPITAL ENCOUNTER (OUTPATIENT)
Dept: GENERAL RADIOLOGY | Facility: HOSPITAL | Age: 62
Discharge: HOME | End: 2022-12-19
Admitting: PHYSICIAN ASSISTANT

## 2022-12-19 ENCOUNTER — PRIOR AUTHORIZATION (OUTPATIENT)
Dept: FAMILY MEDICINE CLINIC | Facility: CLINIC | Age: 62
End: 2022-12-19

## 2022-12-19 ENCOUNTER — OFFICE VISIT (OUTPATIENT)
Dept: FAMILY MEDICINE CLINIC | Facility: CLINIC | Age: 62
End: 2022-12-19

## 2022-12-19 VITALS
TEMPERATURE: 97.1 F | SYSTOLIC BLOOD PRESSURE: 128 MMHG | OXYGEN SATURATION: 99 % | BODY MASS INDEX: 23.3 KG/M2 | WEIGHT: 145 LBS | HEIGHT: 66 IN | DIASTOLIC BLOOD PRESSURE: 80 MMHG | HEART RATE: 74 BPM

## 2022-12-19 DIAGNOSIS — S50.01XA CONTUSION OF RIGHT ELBOW, INITIAL ENCOUNTER: ICD-10-CM

## 2022-12-19 DIAGNOSIS — M25.561 ACUTE PAIN OF RIGHT KNEE: ICD-10-CM

## 2022-12-19 DIAGNOSIS — T07.XXXA ABRASIONS OF MULTIPLE SITES: ICD-10-CM

## 2022-12-19 DIAGNOSIS — S80.01XA CONTUSION OF RIGHT KNEE, INITIAL ENCOUNTER: Primary | ICD-10-CM

## 2022-12-19 DIAGNOSIS — S80.01XA CONTUSION OF RIGHT KNEE, INITIAL ENCOUNTER: ICD-10-CM

## 2022-12-19 PROCEDURE — 99213 OFFICE O/P EST LOW 20 MIN: CPT | Performed by: PHYSICIAN ASSISTANT

## 2022-12-19 PROCEDURE — 73562 X-RAY EXAM OF KNEE 3: CPT

## 2022-12-19 RX ORDER — CELECOXIB 200 MG/1
200 CAPSULE ORAL DAILY
Qty: 14 CAPSULE | Refills: 0 | Status: SHIPPED | OUTPATIENT
Start: 2022-12-19 | End: 2022-12-21 | Stop reason: SDUPTHER

## 2022-12-19 RX ORDER — ESTRADIOL 0.05 MG/D
1 FILM, EXTENDED RELEASE TRANSDERMAL 2 TIMES WEEKLY
COMMUNITY
Start: 2022-11-28

## 2022-12-19 NOTE — PROGRESS NOTES
"    Chief Complaint   Patient presents with   • SWOLLEN RIGHT KNEE     Pt states that she fell on 12-11-22       HPI     Lisbeth Perdue is a pleasant 62 y.o. female who presents for evaluation of \"chief complaint.\"     Fell forward onto anterior knee on 12/11 while playing pickle ball. She also hit her right elbow but it has been improving. She was able to play for an hour longer after her injury. Has been sharing her 's meloxicam and taking Tylenol for pain which she rates 7/10 today. She has sensitivity to the touch over her knee and pain at night which is interfering with her sleep.     Past Medical History:   Diagnosis Date   • Anemia    • Burning mouth syndrome    • CRPS (complex regional pain syndrome)     lower limb   • Hyperlipidemia    • Hypothyroidism    • IBS (irritable bowel syndrome)    • Nevus, atypical    • Osteoarthrosis involving lower leg    • Osteopenia    • Pain in joint, lower leg    • RSD lower limb    • Shingles    • Vitamin D deficiency        Past Surgical History:   Procedure Laterality Date   • BUNIONECTOMY     • COLONOSCOPY  10/2016    DR GORE   • FOOT SURGERY Right    • FRACTURE SURGERY     • HYSTERECTOMY  2003   • TONSILLECTOMY         Family History   Problem Relation Age of Onset   • Hypertension Mother    • Hypothyroidism Mother    • Constipation Mother    • Depression Mother    • Osteoarthritis Mother    • Cancer Mother         squamous cell skin cancer   • Lymphoma Father         non-hodgkins   • Glaucoma Father    • Colon polyps Father    • Depression Father    • Kidney disease Father    • Nephrolithiasis Father    • Hypothyroidism Sister    • Depression Child    • Depression Maternal Grandmother    • Heart attack Maternal Grandfather 66   • Thyroid cancer Maternal Aunt    • Thyroid cancer Cousin    • Ovarian cancer Paternal Aunt    • No Known Problems Paternal Grandmother    • No Known Problems Paternal Grandfather        Social History     Socioeconomic History "   • Marital status:    Tobacco Use   • Smoking status: Never   • Smokeless tobacco: Never   Vaping Use   • Vaping Use: Never used   Substance and Sexual Activity   • Alcohol use: No   • Drug use: No   • Sexual activity: Defer       No Known Allergies    ROS    Review of Systems   Musculoskeletal: Positive for arthralgias and joint swelling.       Vitals:    12/19/22 1252   BP: 128/80   Pulse: 74   Temp: 97.1 °F (36.2 °C)   SpO2: 99%     Body mass index is 23.42 kg/m².      Current Outpatient Medications:   •  ascorbic acid (VITAMIN C) 1000 MG tablet, Take  by mouth daily., Disp: , Rfl:   •  Cholecalciferol (VITAMIN D) 2000 UNITS tablet, Take  by mouth., Disp: , Rfl:   •  clonazePAM (KlonoPIN) 0.5 MG tablet, Take 1 tablet by mouth Daily., Disp: 30 tablet, Rfl: 2  •  Cranberry 250 MG tablet, Take 2 tablets by mouth Daily., Disp: , Rfl:   •  D-Mannose 500 MG capsule, Take 3 capsules by mouth Daily., Disp: , Rfl:   •  DULoxetine (CYMBALTA) 30 MG capsule, Take 1 capsule by mouth Daily., Disp: 30 capsule, Rfl: 11  •  estradiol (MINIVELLE, VIVELLE-DOT) 0.05 MG/24HR patch, 1 patch 2 (Two) Times a Week., Disp: , Rfl:   •  levothyroxine (SYNTHROID, LEVOTHROID) 25 MCG tablet, TAKE 1 TABLET BY MOUTH EVERY DAY, Disp: 30 tablet, Rfl: 11  •  Loratadine 10 MG capsule, Take  by mouth., Disp: , Rfl:   •  Omega-3 Fatty Acids (FISH OIL) 1000 MG capsule capsule, Take  by mouth daily., Disp: , Rfl:   •  Omeprazole 20 MG tablet delayed-release, , Disp: , Rfl:   •  Probiotic Product (PROBIOTIC DAILY PO), Take  by mouth., Disp: , Rfl:   •  rosuvastatin (Crestor) 5 MG tablet, Take 1 tablet by mouth Every Night., Disp: 30 tablet, Rfl: 11  •  vitamin B-12 (CYANOCOBALAMIN) 100 MCG tablet, Take 50 mcg by mouth Daily., Disp: , Rfl:   •  celecoxib (CeleBREX) 200 MG capsule, Take 1 capsule by mouth Daily., Disp: 14 capsule, Rfl: 0  •  estradiol (ESTRACE) 0.1 MG/GM vaginal cream, Insert 2 g into the vagina Daily., Disp: , Rfl:      PE    Physical Exam  Vitals reviewed.   Constitutional:       General: She is not in acute distress.  Pulmonary:      Effort: Pulmonary effort is normal. No respiratory distress.   Musculoskeletal:      Right elbow: No swelling. Normal range of motion. Tenderness present in olecranon process.      Right knee: Bony tenderness present. Tenderness present. No LCL laxity or MCL laxity. Normal meniscus.      Instability Tests: Anterior drawer test negative. Posterior drawer test negative. Medial Fabrizio test negative and lateral Fabrizio test negative.        Legs:       Comments: Very mild tenderness over the olecranon right elbow.  There is a small abrasion without surrounding soft tissue swelling or erythema.   Neurological:      Mental Status: She is alert.   Psychiatric:         Mood and Affect: Mood normal.          A/P    Problem List Items Addressed This Visit    None  Visit Diagnoses     Contusion of right knee, initial encounter    -  Primary  -Persistent pain within 1 week after injury.  -We will order x-ray to rule out fracture though we did discuss the possibility of a bone bruise.   -Rx celebrex and may continue Tylenol prn for pain. Also reviewed leg elevation, compression, and icing as needed for pain and swelling.  -We will give further recommendations pending her x-ray results.    Relevant Orders    XR Knee 3 View Right    Acute pain of right knee        Relevant Orders    XR Knee 3 View Right    Contusion of right elbow, initial encounter      -Benign exam today. Defer x-ray and proceed with conservative management as above.       Abrasions of multiple sites      -Discussed keeping areas clean and dry.          Plan of care was reviewed with patient at the conclusion of today's visit. Education was provided regarding diagnoses, management, prescribed or recommended OTC products, and the importance of compliance with follow-up appointments. The patient was counseled regarding the risks, benefits,  and possible side-effects of treatment. I advised the patient to keep me informed of any acute changes in their status including new, worsening, or persistent symptoms. Patient expresses understanding and agreement with the management plan.        THANG Palomares

## 2022-12-21 RX ORDER — CELECOXIB 200 MG/1
200 CAPSULE ORAL DAILY
Qty: 14 CAPSULE | Refills: 0 | Status: SHIPPED | OUTPATIENT
Start: 2022-12-21

## 2022-12-21 NOTE — TELEPHONE ENCOUNTER
Patient requested rx be sent to [facility] for cheaper price with [facility]  Rx Refill Note  Requested Prescriptions     Signed Prescriptions Disp Refills   • celecoxib (CeleBREX) 200 MG capsule 14 capsule 0     Sig: Take 1 capsule by mouth Daily.     Authorizing Provider: JITENDRA MACIAS     Ordering User: VIRGINIA SALOMON      Last office visit with prescribing clinician: 10/28/2022   Last telemedicine visit with prescribing clinician: Visit date not found   Next office visit with prescribing clinician: 8/7/2023                         Would you like a call back once the refill request has been completed: [] Yes [] No    If the office needs to give you a call back, can they leave a voicemail: [] Yes [] No    Virginia Salomon MA  12/21/22, 12:27 EST

## 2023-05-03 ENCOUNTER — OFFICE VISIT (OUTPATIENT)
Dept: FAMILY MEDICINE CLINIC | Facility: CLINIC | Age: 63
End: 2023-05-03
Payer: COMMERCIAL

## 2023-05-03 ENCOUNTER — LAB (OUTPATIENT)
Dept: LAB | Facility: HOSPITAL | Age: 63
End: 2023-05-03
Payer: COMMERCIAL

## 2023-05-03 VITALS
HEIGHT: 65 IN | OXYGEN SATURATION: 98 % | HEART RATE: 75 BPM | SYSTOLIC BLOOD PRESSURE: 118 MMHG | WEIGHT: 143.6 LBS | BODY MASS INDEX: 23.93 KG/M2 | DIASTOLIC BLOOD PRESSURE: 60 MMHG

## 2023-05-03 DIAGNOSIS — R41.3 MEMORY CHANGE: ICD-10-CM

## 2023-05-03 DIAGNOSIS — M17.0 PRIMARY OSTEOARTHRITIS OF BOTH KNEES: ICD-10-CM

## 2023-05-03 DIAGNOSIS — N81.10 CYSTOCELE, UNSPECIFIED: ICD-10-CM

## 2023-05-03 DIAGNOSIS — G90.50 RSD (REFLEX SYMPATHETIC DYSTROPHY): Primary | Chronic | ICD-10-CM

## 2023-05-03 DIAGNOSIS — K14.6 BURNING MOUTH SYNDROME: Chronic | ICD-10-CM

## 2023-05-03 PROCEDURE — 82607 VITAMIN B-12: CPT | Performed by: FAMILY MEDICINE

## 2023-05-03 PROCEDURE — 99214 OFFICE O/P EST MOD 30 MIN: CPT | Performed by: FAMILY MEDICINE

## 2023-05-03 PROCEDURE — 84443 ASSAY THYROID STIM HORMONE: CPT | Performed by: FAMILY MEDICINE

## 2023-05-03 RX ORDER — GABAPENTIN 100 MG/1
100 CAPSULE ORAL
Qty: 30 CAPSULE | Refills: 2 | Status: SHIPPED | OUTPATIENT
Start: 2023-05-03

## 2023-05-03 RX ORDER — CLONAZEPAM 0.5 MG/1
0.5 TABLET ORAL DAILY
Qty: 30 TABLET | Refills: 2 | Status: SHIPPED | OUTPATIENT
Start: 2023-05-03

## 2023-05-03 RX ORDER — MELOXICAM 15 MG/1
15 TABLET ORAL DAILY
Qty: 90 TABLET | Refills: 3 | Status: SHIPPED | OUTPATIENT
Start: 2023-05-03

## 2023-05-03 RX ORDER — MELOXICAM 15 MG/1
1 TABLET ORAL DAILY
COMMUNITY
Start: 2023-03-19 | End: 2023-05-03 | Stop reason: SDUPTHER

## 2023-05-03 RX ORDER — GABAPENTIN 300 MG/1
300 CAPSULE ORAL 2 TIMES DAILY
Qty: 60 CAPSULE | Refills: 2 | Status: SHIPPED | OUTPATIENT
Start: 2023-05-03

## 2023-05-03 NOTE — PROGRESS NOTES
"Chief Complaint  memory issue, Knee Pain (Primary on right ), and Med Refill (Meloxicam )    Subjective        Lisbeth Perdue presents to [facility]  History of Present Illness     She fell in December onto right knee cap while playing pickle ball. She was evaluated by orthopedics in February. RSD flare-up in knee with nerve pain. She had additional xrays. She has tried gabapentin 300mg BID. Orthopedics recommend PT and meloxicam. She hasn't started PT. She fell again March 10th playing pickle ball, landed on both knees. Walking long distances increases knee pain. Left knee pain has resolved. Pain described as ache and burning. Hard to have anything touch her knee such as clothing. Left hand braced her fall with thumb and first finger pain when she pushes herself up.     Concerned about her memory. Decline in cognitive ability. She is struggling to learn how to play games and the rules.     She has burning mouth taking klonopin.     She saw GYN in Summer bladder prolapse. Did pelvic PT. Started on estrogen patch. She doesn't feel her bladder empties. She wants to avoid surgery.     She has frequent ear wax.       Objective   Vital Signs:  /60   Pulse 75   Ht 165.1 cm (65\")   Wt 65.1 kg (143 lb 9.6 oz)   SpO2 98%   BMI 23.90 kg/m²   Estimated body mass index is 23.9 kg/m² as calculated from the following:    Height as of this encounter: 165.1 cm (65\").    Weight as of this encounter: 65.1 kg (143 lb 9.6 oz).       BMI is within normal parameters. No other follow-up for BMI required.      Physical Exam  Vitals reviewed.   Constitutional:       General: She is not in acute distress.     Appearance: She is not ill-appearing.   HENT:      Right Ear: Tympanic membrane and ear canal normal. There is no impacted cerumen.      Left Ear: Tympanic membrane and ear canal normal. There is no impacted cerumen.   Cardiovascular:      Rate and Rhythm: Normal rate and regular " rhythm.   Pulmonary:      Effort: Pulmonary effort is normal.      Breath sounds: Normal breath sounds.   Musculoskeletal:      Right knee: Crepitus present. No swelling, deformity, effusion, erythema or ecchymosis. Normal range of motion. No LCL laxity, MCL laxity or ACL laxity.      Instability Tests: Anterior drawer test negative. Anterior Lachman test negative. Medial Fabrizio test negative and lateral Fabrizio test negative.      Left knee: Crepitus present. No swelling, deformity, effusion, erythema or ecchymosis. Normal range of motion. No LCL laxity, MCL laxity or ACL laxity.     Instability Tests: Anterior drawer test negative. Anterior Lachman test negative. Medial Fabrizio test negative and lateral Fabrizio test negative.   Neurological:      Mental Status: She is alert.   Psychiatric:         Mood and Affect: Affect is tearful.        Result Review :          XR Knee 3 View Right (12/19/2022 13:52)      CONTROLLED SUBSTANCE AGREEMENT - SCAN - CONTROLLED SUBSTANCE AGREEMENT, [facility] ALTON, 10/28/2022 (10/28/2022)   Drug Analysis,Comp,Oral Fluid - , (10/28/2022 00:00)   The treatment plan includes a controlled substance.  Controlled Substance Medication Agreement signed and on file. Reviewed UDS.  Risks, benefits, and alternative treatments reviewed.  NICKY report 05/03/23 has been reviewed.       Assessment and Plan   Diagnoses and all orders for this visit:    1. RSD (reflex sympathetic dystrophy) (Primary)  -     gabapentin (NEURONTIN) 300 MG capsule; Take 1 capsule by mouth 2 (Two) Times a Day.  Dispense: 60 capsule; Refill: 2  -     gabapentin (NEURONTIN) 100 MG capsule; Take 1 capsule by mouth Daily With Lunch.  Dispense: 30 capsule; Refill: 2  Worse.  Take gabapentin 300 mg morning and evening with the midday dose of 100 mg to reduce sedative effects during the daytime.  Reassess in 3 months.  2. Primary osteoarthritis of both knees  -     meloxicam (MOBIC) 15 MG tablet; Take 1 tablet by mouth Daily.   Dispense: 90 tablet; Refill: 3  Continue meloxicam.  Do not use with other NSAIDs.  She has an order for physical therapy and I feel it could be given official to also have a home exercise program.  3. Memory change  -     MRI Brain Without Contrast; Future  -     TSH Rfx On Abnormal To Free T4; Future  -     Vitamin B12; Future  -     Ambulatory Referral to Neurology  -     TSH Rfx On Abnormal To Free T4  -     Vitamin B12  Family history of Alzheimer's.  Start work-up with labs and MRI.  Referred to neurology for evaluation.  4. Burning mouth syndrome  -     clonazePAM (KlonoPIN) 0.5 MG tablet; Take 1 tablet by mouth Daily.  Dispense: 30 tablet; Refill: 2  Continue clonazepam.  She has a goal of weaning off medication in the future.  5. Cystocele, unspecified  Recommend continuing home pelvic floor exercises.  She would like to avoid surgery if possible.           Follow Up   Return in about 3 months (around 8/3/2023) for Physical and fasting labs.  Patient was given instructions and counseling regarding her condition or for health maintenance advice. Please see specific information pulled into the AVS if appropriate.     Electronically signed by Payton Carey MD, 05/03/23, 2:07 PM EDT.

## 2023-05-04 LAB
TSH SERPL DL<=0.05 MIU/L-ACNC: 2.01 UIU/ML (ref 0.27–4.2)
VIT B12 BLD-MCNC: >2000 PG/ML (ref 211–946)

## 2023-05-05 ENCOUNTER — PATIENT MESSAGE (OUTPATIENT)
Dept: FAMILY MEDICINE CLINIC | Facility: CLINIC | Age: 63
End: 2023-05-05
Payer: COMMERCIAL

## 2023-05-15 ENCOUNTER — HOSPITAL ENCOUNTER (OUTPATIENT)
Dept: MRI IMAGING | Facility: HOSPITAL | Age: 63
Discharge: HOME | End: 2023-05-15
Admitting: FAMILY MEDICINE
Payer: COMMERCIAL

## 2023-05-15 DIAGNOSIS — R41.3 MEMORY CHANGE: ICD-10-CM

## 2023-05-15 PROCEDURE — 70551 MRI BRAIN STEM W/O DYE: CPT

## 2023-07-27 DIAGNOSIS — E03.9 ACQUIRED HYPOTHYROIDISM: Chronic | ICD-10-CM

## 2023-07-27 RX ORDER — LEVOTHYROXINE SODIUM 0.03 MG/1
TABLET ORAL
Qty: 90 TABLET | Refills: 1 | Status: SHIPPED | OUTPATIENT
Start: 2023-07-27

## 2023-07-27 NOTE — TELEPHONE ENCOUNTER
Rx Refill Note  Requested Prescriptions     Pending Prescriptions Disp Refills    levothyroxine (SYNTHROID, LEVOTHROID) 25 MCG tablet [Pharmacy Med Name: LEVOTHYROXINE 25 MCG TABLET] 30 tablet 7     Sig: TAKE 1 TABLET BY MOUTH EVERY DAY      Last office visit with prescribing clinician: 5/3/2023   Last telemedicine visit with prescribing clinician: Visit date not found   Next office visit with prescribing clinician: 8/7/2023                         Would you like a call back once the refill request has been completed: [] Yes [] No    If the office needs to give you a call back, can they leave a voicemail: [] Yes [] No    Maurice Barrera MA  07/27/23, 08:59 EDT

## 2023-07-31 ENCOUNTER — PATIENT MESSAGE (OUTPATIENT)
Dept: FAMILY MEDICINE CLINIC | Facility: CLINIC | Age: 63
End: 2023-07-31
Payer: COMMERCIAL

## 2023-07-31 DIAGNOSIS — E78.00 PURE HYPERCHOLESTEROLEMIA: Chronic | ICD-10-CM

## 2023-07-31 DIAGNOSIS — Z00.00 WELL ADULT EXAM: Primary | ICD-10-CM

## 2023-07-31 DIAGNOSIS — E03.9 ACQUIRED HYPOTHYROIDISM: ICD-10-CM

## 2023-07-31 DIAGNOSIS — E55.9 VITAMIN D DEFICIENCY: Chronic | ICD-10-CM

## 2023-08-02 ENCOUNTER — LAB (OUTPATIENT)
Dept: LAB | Facility: HOSPITAL | Age: 63
End: 2023-08-02
Payer: COMMERCIAL

## 2023-08-02 DIAGNOSIS — E78.00 PURE HYPERCHOLESTEROLEMIA: Chronic | ICD-10-CM

## 2023-08-02 DIAGNOSIS — Z00.00 WELL ADULT EXAM: ICD-10-CM

## 2023-08-02 DIAGNOSIS — E55.9 VITAMIN D DEFICIENCY: Chronic | ICD-10-CM

## 2023-08-02 DIAGNOSIS — E03.9 ACQUIRED HYPOTHYROIDISM: ICD-10-CM

## 2023-08-02 LAB
25(OH)D3 SERPL-MCNC: 46.1 NG/ML (ref 30–100)
ALBUMIN SERPL-MCNC: 4.4 G/DL (ref 3.5–5.2)
ALBUMIN/GLOB SERPL: 1.9 G/DL
ALP SERPL-CCNC: 43 U/L (ref 39–117)
ALT SERPL W P-5'-P-CCNC: 20 U/L (ref 1–33)
ANION GAP SERPL CALCULATED.3IONS-SCNC: 8.5 MMOL/L (ref 5–15)
AST SERPL-CCNC: 26 U/L (ref 1–32)
BILIRUB SERPL-MCNC: 0.5 MG/DL (ref 0–1.2)
BUN SERPL-MCNC: 17 MG/DL (ref 8–23)
BUN/CREAT SERPL: 18.7 (ref 7–25)
CALCIUM SPEC-SCNC: 9.7 MG/DL (ref 8.6–10.5)
CHLORIDE SERPL-SCNC: 106 MMOL/L (ref 98–107)
CHOLEST SERPL-MCNC: 202 MG/DL (ref 0–200)
CO2 SERPL-SCNC: 26.5 MMOL/L (ref 22–29)
CREAT SERPL-MCNC: 0.91 MG/DL (ref 0.57–1)
DEPRECATED RDW RBC AUTO: 40.9 FL (ref 37–54)
EGFRCR SERPLBLD CKD-EPI 2021: 71 ML/MIN/1.73
ERYTHROCYTE [DISTWIDTH] IN BLOOD BY AUTOMATED COUNT: 12.5 % (ref 12.3–15.4)
GLOBULIN UR ELPH-MCNC: 2.3 GM/DL
GLUCOSE SERPL-MCNC: 91 MG/DL (ref 65–99)
HCT VFR BLD AUTO: 37.3 % (ref 34–46.6)
HDLC SERPL-MCNC: 97 MG/DL (ref 40–60)
HGB BLD-MCNC: 12.4 G/DL (ref 12–15.9)
LDLC SERPL CALC-MCNC: 89 MG/DL (ref 0–100)
LDLC/HDLC SERPL: 0.89 {RATIO}
MCH RBC QN AUTO: 29.9 PG (ref 26.6–33)
MCHC RBC AUTO-ENTMCNC: 33.2 G/DL (ref 31.5–35.7)
MCV RBC AUTO: 89.9 FL (ref 79–97)
PLATELET # BLD AUTO: 264 10*3/MM3 (ref 140–450)
PMV BLD AUTO: 11 FL (ref 6–12)
POTASSIUM SERPL-SCNC: 5 MMOL/L (ref 3.5–5.2)
PROT SERPL-MCNC: 6.7 G/DL (ref 6–8.5)
RBC # BLD AUTO: 4.15 10*6/MM3 (ref 3.77–5.28)
SODIUM SERPL-SCNC: 141 MMOL/L (ref 136–145)
TRIGL SERPL-MCNC: 93 MG/DL (ref 0–150)
TSH SERPL DL<=0.05 MIU/L-ACNC: 3.15 UIU/ML (ref 0.27–4.2)
VLDLC SERPL-MCNC: 16 MG/DL (ref 5–40)
WBC NRBC COR # BLD: 5.34 10*3/MM3 (ref 3.4–10.8)

## 2023-08-02 PROCEDURE — 80061 LIPID PANEL: CPT

## 2023-08-02 PROCEDURE — 82306 VITAMIN D 25 HYDROXY: CPT

## 2023-08-02 PROCEDURE — 80050 GENERAL HEALTH PANEL: CPT

## 2023-08-07 ENCOUNTER — OFFICE VISIT (OUTPATIENT)
Dept: FAMILY MEDICINE CLINIC | Facility: CLINIC | Age: 63
End: 2023-08-07
Payer: COMMERCIAL

## 2023-08-07 VITALS
SYSTOLIC BLOOD PRESSURE: 120 MMHG | BODY MASS INDEX: 22.4 KG/M2 | OXYGEN SATURATION: 96 % | DIASTOLIC BLOOD PRESSURE: 80 MMHG | HEART RATE: 60 BPM | WEIGHT: 138.8 LBS

## 2023-08-07 DIAGNOSIS — Z00.00 WELL ADULT EXAM: Primary | ICD-10-CM

## 2023-08-07 DIAGNOSIS — E55.9 VITAMIN D DEFICIENCY: Chronic | ICD-10-CM

## 2023-08-07 DIAGNOSIS — E03.9 ACQUIRED HYPOTHYROIDISM: ICD-10-CM

## 2023-08-07 DIAGNOSIS — K14.6 BURNING MOUTH SYNDROME: Chronic | ICD-10-CM

## 2023-08-07 DIAGNOSIS — Z12.11 SCREENING FOR MALIGNANT NEOPLASM OF COLON: ICD-10-CM

## 2023-08-07 DIAGNOSIS — M54.2 ACUTE NECK PAIN: ICD-10-CM

## 2023-08-07 DIAGNOSIS — E78.00 PURE HYPERCHOLESTEROLEMIA: Chronic | ICD-10-CM

## 2023-08-07 DIAGNOSIS — M85.852 OSTEOPENIA OF NECK OF LEFT FEMUR: Chronic | ICD-10-CM

## 2023-08-07 DIAGNOSIS — F41.1 GAD (GENERALIZED ANXIETY DISORDER): Chronic | ICD-10-CM

## 2023-08-07 DIAGNOSIS — G90.50 RSD (REFLEX SYMPATHETIC DYSTROPHY): Chronic | ICD-10-CM

## 2023-08-07 PROBLEM — Z83.719 FH: COLON POLYPS: Status: ACTIVE | Noted: 2023-08-07

## 2023-08-07 PROBLEM — Z83.71 FH: COLON POLYPS: Status: ACTIVE | Noted: 2023-08-07

## 2023-08-07 PROCEDURE — 99396 PREV VISIT EST AGE 40-64: CPT | Performed by: FAMILY MEDICINE

## 2023-08-07 RX ORDER — GABAPENTIN 100 MG/1
CAPSULE ORAL
Qty: 180 CAPSULE | Refills: 2 | Status: SHIPPED | OUTPATIENT
Start: 2023-08-07

## 2023-08-07 RX ORDER — DULOXETIN HYDROCHLORIDE 30 MG/1
30 CAPSULE, DELAYED RELEASE ORAL DAILY
Qty: 30 CAPSULE | Refills: 11 | Status: SHIPPED | OUTPATIENT
Start: 2023-08-07

## 2023-08-07 RX ORDER — ROSUVASTATIN CALCIUM 5 MG/1
5 TABLET, COATED ORAL NIGHTLY
Qty: 30 TABLET | Refills: 11 | Status: SHIPPED | OUTPATIENT
Start: 2023-08-07

## 2023-08-07 RX ORDER — CLONAZEPAM 0.5 MG/1
0.5 TABLET ORAL DAILY
Qty: 30 TABLET | Refills: 2 | Status: SHIPPED | OUTPATIENT
Start: 2023-08-07

## 2023-08-07 NOTE — PROGRESS NOTES
Chief Complaint  Well adult exam, Annual Exam, and Stye (Internal on left eye)    Subjective          Lisbeth Perdue presents to [facility] for   History of Present Illness      She is scheduled for mammogram and ovarian screening in the Fall     Taking klonopin for 20 years for burning mouth.     She continues to have right knee pain and RSD. She takes gabapentin 100mg morning, 300mg afternoon and 300mg evening. She is interested tapering prescription.     She is taking rosuvastatin every other day since last month.     Left upper eye lid stye, evaluated at outside  a couple months ago. She saw ophthalmology, treated with doxycycline for 30 days and warm compress. She burned eyelid with warm compress. New one started on right eye. She didn't have it lanced. She feels headaches, anxiety side effect when on doxycycline. Pain radiates up her face to top of head. She is taking omega 3 supplement.     Several months left shoulder pain. No known injury. Appointment with orthopedics next week.     Neck pain a couple weeks ago and had difficulty turning her neck. Now normal ROM but pain to left side of neck.     Waking up to go to bathroom and can't go back to sleep.     She has bladder hernia. Follow-up with GYN in November. She started pelvic floor PT.       Objective   Vital Signs:   Vitals:    08/07/23 1402   BP: 120/80   Pulse: 60   SpO2: 96%   Weight: 63 kg (138 lb 12.8 oz)     Body mass index is 22.4 kg/mý.    BMI is within normal parameters. No other follow-up for BMI required.          Physical Exam  Constitutional:       General: She is not in acute distress.  HENT:      Right Ear: Tympanic membrane and ear canal normal.      Left Ear: Tympanic membrane and ear canal normal.      Nose: No congestion or rhinorrhea.      Mouth/Throat:      Mouth: Mucous membranes are moist.      Pharynx: No oropharyngeal exudate or posterior oropharyngeal erythema.   Eyes:      General:     Subjective  Patient seen and examined.  Awake today communicating more.  no dysarthria noted seen by speech therapy started on puréed diet good urine output per RN and not sure when she had the last bowel movement    Review of Systems  Limited secondary to dementia   Objective    Last Recorded Vitals  Blood pressure 115/72, pulse (!) 43, temperature 97.5 °F (36.4 °C), temperature source Oral, resp. rate 17, weight 72.6 kg (160 lb 0.9 oz), SpO2 95 %.  Body mass index is 31.26 kg/m².    Physical Exam      Intake/Output Summary (Last 24 hours) at 11/4/2020 1544  Last data filed at 11/3/2020 1800  Gross per 24 hour   Intake 500 ml   Output 300 ml   Net 200 ml        Labs   Recent Results (from the past 24 hour(s))   CBC No Differential    Collection Time: 11/03/20  9:37 PM   Result Value Ref Range    WBC 10.2 4.2 - 11.0 K/mcL    RBC 3.55 (L) 4.00 - 5.20 mil/mcL    HGB 11.1 (L) 12.0 - 15.5 g/dL    HCT 38.9 36.0 - 46.5 %    .6 (H) 78.0 - 100.0 fl    MCH 31.3 26.0 - 34.0 pg    MCHC 28.5 (L) 32.0 - 36.5 g/dL    RDW-CV 14.0 11.0 - 15.0 %    PLT 93 (L) 140 - 450 K/mcL    NRBC 0 0 /100 WBC   Basic Metabolic Panel    Collection Time: 11/03/20  9:37 PM   Result Value Ref Range    Sodium 161 (HH) 135 - 145 mmol/L    Potassium 3.9 3.4 - 5.1 mmol/L    Chloride 127 (H) 98 - 107 mmol/L    Carbon Dioxide 27 21 - 32 mmol/L    Anion Gap 11 10 - 20 mmol/L    Glucose 146 (H) 65 - 99 mg/dL    BUN 49 (H) 6 - 20 mg/dL    Creatinine 1.05 (H) 0.51 - 0.95 mg/dL    GFR Estimate,  55     GFR Estimate, Non African American 47     BUN/Creatinine Ratio 47 (H) 7 - 25    CALCIUM 8.5 8.4 - 10.2 mg/dL   Troponin I Ultra Sensitive    Collection Time: 11/04/20  1:37 AM   Result Value Ref Range    TROPONIN I 0.05 (HH) <0.05 ng/mL        Imaging  No results found.      LAST ECHO/ECHO STRESS:  No procedure found.  Results for orders placed or performed during the hospital encounter of 11/02/20   Electrocardiogram 12-Lead   Result Value       Right eye: No discharge.         Left eye: No discharge.      Conjunctiva/sclera: Conjunctivae normal.   Neck:      Thyroid: No thyromegaly.   Cardiovascular:      Rate and Rhythm: Normal rate and regular rhythm.   Pulmonary:      Effort: Pulmonary effort is normal.      Breath sounds: Normal breath sounds.   Abdominal:      Palpations: Abdomen is soft. There is no hepatomegaly.      Tenderness: There is no abdominal tenderness.   Musculoskeletal:      Cervical back: Neck supple. Pain with movement present. Decreased range of motion.      Right lower leg: No edema.      Left lower leg: No edema.   Lymphadenopathy:      Head:      Right side of head: No submandibular, preauricular or posterior auricular adenopathy.      Left side of head: No submandibular, preauricular or posterior auricular adenopathy.      Cervical: No cervical adenopathy.   Skin:     General: Skin is warm.   Neurological:      Mental Status: She is alert and oriented to person, place, and time.      Gait: Gait normal.   Psychiatric:         Mood and Affect: Mood normal.         Behavior: Behavior normal.         Thought Content: Thought content normal.         Judgment: Judgment normal.      Result Review :   The following data was reviewed by: Payton Carey MD on 08/07/2023:  Common labs          8/2/2023    08:25   Common Labs   Glucose 91    BUN 17    Creatinine 0.91    Sodium 141    Potassium 5.0    Chloride 106    Calcium 9.7    Albumin 4.4    Total Bilirubin 0.5    Alkaline Phosphatase 43    AST (SGOT) 26    ALT (SGPT) 20    WBC 5.34    Hemoglobin 12.4    Hematocrit 37.3    Platelets 264    Total Cholesterol 202    Triglycerides 93    HDL Cholesterol 97    LDL Cholesterol  89      TSH          5/3/2023    14:06 8/2/2023    08:25   TSH   TSH 2.010  3.150      Vitamin D,25-Hydroxy (08/02/2023 08:25)     Drug Analysis,Comp,Oral Fluid - , (10/28/2022 00:00)   CONTROLLED SUBSTANCE AGREEMENT - SCAN - CONTROLLED SUBSTANCE AGREEMENT, [facility]  Ref Range    Ventricular Rate EKG/Min (BPM) 75     Atrial Rate (BPM) 75     WI-Interval (MSEC) 112     QRS-Interval (MSEC) 120     QT-Interval (MSEC) 430     QTc 480     P Axis (Degrees) -102     R Axis (Degrees) -36     T Axis (Degrees) 47     REPORT TEXT       Ectopic atrial rhythm  Left axis deviation  Left ventricular hypertrophy  with QRS widening  Nonspecific T wave abnormality  Abnormal ECG  When compared with ECG of  02-NOV-2020 17:05,  ectopic atrila rhythm is new.   Confirmed by CHRIS AJ MD ([id number]) on 11/3/2020 9:47:34 AM      \"}      Assessment and Plan    89-year-old past medical history significant for dementia leg wounds, DVT, hypertension, anemia, breast cancer was brought in from [facility] secondary to hypernatremia elevated white count slurred speech x-ray no definite acute process CT head in the emergency room without any evidence of acute hemorrhage mass-     Hypernatremia  Patient appears to be dehydrated continue IV fluids and reassess renal following on D5 water.  Sodium still elevated good urine output per RN     Dysarthria  CT head negative neurology consulted no further work-up recommended . Per daughter its her baseline     Dehydration  Continue IV fluids     UTI (urinary tract infection)  On IV cefepime await cultures     Leukocytosis  Likely from UTI versus reactive from dehydration monitor on fluids and antibiotics     Troponin level elevated  Cardiology consulted likely secondary to hypotension doubt ACS patient hypotensive hold blood pressure medications even though 1 episode of heart rate documented at 40's from vitals  RN not sure if that is correct no events on the monitor cardiology following     Hypotension  Hold BP meds and continue fluids     Chronic deep vein thrombosis (DVT) of both lower extremities (CMS/HCC)  Continue Eliquis     Multiple decubitus wounds on the legs and body follow wound care MDs recommendations I did not personally examine the  ALTON, 10/28/2022 (10/28/2022)   The treatment plan includes a controlled substance.  Controlled Substance Medication Agreement signed and on file. Reviewed UDS.  Risks, benefits, and alternative treatments reviewed.  NICKY report 08/07/23 has been reviewed.             Immunization History   Administered Date(s) Administered    COVID-19 (PFIZER) BIVALENT 12+YRS 10/19/2022    COVID-19 (PFIZER) Purple Cap Monovalent 01/18/2021, 02/08/2021, 11/02/2021, 07/15/2022    Covid-19 (Pfizer) Gray Cap Monovalent 07/15/2022    FluLaval/Fluzone >6mos 10/17/2017, 10/09/2020, 09/24/2021, 10/19/2022    Influenza Injectable Mdck Pf Quad 10/19/2022    Influenza Split Preservative Free ID 12/30/2013    Influenza, Unspecified 10/05/2020    Shingrix 07/06/2021, 09/07/2021    Tdap 12/23/2011, 09/08/2017    Zoster, Unspecified 07/06/2021    flucelvax quad pfs =>4 YRS 10/08/2019       Health Maintenance   Topic Date Due    COLORECTAL CANCER SCREENING  10/28/2021    DXA SCAN  12/07/2022    INFLUENZA VACCINE  10/01/2023    MAMMOGRAM  11/07/2023    LIPID PANEL  08/02/2024    ANNUAL PHYSICAL  08/07/2024    TDAP/TD VACCINES (3 - Td or Tdap) 09/08/2027    HEPATITIS C SCREENING  Completed    COVID-19 Vaccine  Completed    ZOSTER VACCINE  Completed    Pneumococcal Vaccine 0-64  Aged Out    PAP SMEAR  Discontinued            Assessment and Plan    Diagnoses and all orders for this visit:    1. Well adult exam (Primary)  Reviewed previsit fasting labs with patient in detail.  Musicians up-to-date.  Breast cancer screening mammogram scheduled for November.  She also participates in ovarian cancer screening program.  She is due for colonoscopy.  She is due for bone density.  2. Pure hypercholesterolemia  -     rosuvastatin (Crestor) 5 MG tablet; Take 1 tablet by mouth Every Night.  Dispense: 30 tablet; Refill: 11  Patient had been taking rosuvastatin every other day and her LDL levels have increased.  I recommend resuming nightly dosing.  I also  wounds  Discussed with ARELIS Guallpa      Diet  Puréed diet with honey thick liquids        Disposition       DVT Prophylaxis      Eliquis  Code Status    Code Status: Not on file        Notified primary care physician  PCP:  Isha Quezada MD         recommend that she discuss her cognitive concerns with neurology as scheduled later on this month.  3. STEPH (generalized anxiety disorder)  -     DULoxetine (CYMBALTA) 30 MG capsule; Take 1 capsule by mouth Daily.  Dispense: 30 capsule; Refill: 11  Continue duloxetine.  4. Acquired hypothyroidism  Continue levothyroxine 25 mcg.  5. Vitamin D deficiency  Continue supplement.  6. Burning mouth syndrome  -     clonazePAM (KlonoPIN) 0.5 MG tablet; Take 1 tablet by mouth Daily.  Dispense: 30 tablet; Refill: 2  Continue clonazepam.  7. RSD (reflex sympathetic dystrophy)  -     gabapentin (NEURONTIN) 100 MG capsule; Take 1 capsule by mouth Every Morning AND 2 capsules Daily With Lunch AND 3 capsules Every Night. Decrease by 100mg weekly  Dispense: 180 capsule; Refill: 2  Patient has a goal of reducing medications that could be affecting cognition.  We decided to begin tapering gabapentin prescription first.  She has currently been taking gabapentin 100 mg in the morning, 300 mg in the afternoon, and 300 mg in the evening.  Begin taper with 100 mg in the morning, 200 mg in the afternoon, and 300 mg in the evening.  Plan to continue each week decreasing gabapentin by 100 mg.  If she experiences increase in her pain plan to resume previous doses.  8. colon screen  -     Ambulatory Referral For Screening Colonoscopy  Family history of colon polyps.  Her most recent colonoscopy was performed in 2016.  9. Osteopenia of neck of left femur  -     DEXA Bone Density Axial; Future  Bone density with lowest T score -1.3 in 2020 and recommend repeat.  She is on vitamin D supplementation.  10. Acute neck pain  Recommend warm compress to the area.  I do not recommend muscle relaxer since she takes benzodiazepine chronically and I am concerned about additive effects.      Counseling/anticipatory guidance: Nutrition, physical activity,  mental health, eye exam, immunizations, screenings      Follow Up   Return in about 3 months (around  11/15/2023) for Office visit chronic pain and physical in 1 year.  Patient was given instructions and counseling regarding her condition or for health maintenance advice. Please see specific information pulled into the AVS if appropriate.      Electronically signed by Payton Carey MD, 08/07/23, 4:10 PM EDT.

## 2023-08-22 DIAGNOSIS — Z12.12 SCREENING FOR COLORECTAL CANCER: Primary | ICD-10-CM

## 2023-08-22 DIAGNOSIS — R11.2 NAUSEA AND VOMITING, UNSPECIFIED VOMITING TYPE: Primary | ICD-10-CM

## 2023-08-22 DIAGNOSIS — Z12.11 SCREENING FOR COLORECTAL CANCER: Primary | ICD-10-CM

## 2023-08-22 RX ORDER — ONDANSETRON 4 MG/1
4 TABLET, FILM COATED ORAL EVERY 6 HOURS PRN
Qty: 6 TABLET | Refills: 0 | Status: SHIPPED | OUTPATIENT
Start: 2023-08-22

## 2023-08-31 ENCOUNTER — OUTSIDE FACILITY SERVICE (OUTPATIENT)
Dept: GASTROENTEROLOGY | Facility: CLINIC | Age: 63
End: 2023-08-31
Payer: COMMERCIAL

## 2023-08-31 PROCEDURE — 45378 DIAGNOSTIC COLONOSCOPY: CPT | Performed by: INTERNAL MEDICINE

## 2023-09-22 ENCOUNTER — HOSPITAL ENCOUNTER (OUTPATIENT)
Dept: BONE DENSITY | Facility: HOSPITAL | Age: 63
Discharge: HOME | End: 2023-09-22
Admitting: FAMILY MEDICINE
Payer: COMMERCIAL

## 2023-09-22 DIAGNOSIS — M85.852 OSTEOPENIA OF NECK OF LEFT FEMUR: Chronic | ICD-10-CM

## 2023-09-22 PROCEDURE — 77080 DXA BONE DENSITY AXIAL: CPT

## 2023-10-03 DIAGNOSIS — F41.1 GAD (GENERALIZED ANXIETY DISORDER): Chronic | ICD-10-CM

## 2023-10-03 DIAGNOSIS — E78.00 PURE HYPERCHOLESTEROLEMIA: Chronic | ICD-10-CM

## 2023-10-03 RX ORDER — ROSUVASTATIN CALCIUM 5 MG/1
TABLET, COATED ORAL
Qty: 30 TABLET | Refills: 6 | OUTPATIENT
Start: 2023-10-03

## 2023-10-03 RX ORDER — DULOXETIN HYDROCHLORIDE 30 MG/1
CAPSULE, DELAYED RELEASE ORAL
Qty: 30 CAPSULE | Refills: 3 | OUTPATIENT
Start: 2023-10-03

## 2023-10-03 NOTE — TELEPHONE ENCOUNTER
Rx Refill Note  Requested Prescriptions     Pending Prescriptions Disp Refills    DULoxetine (CYMBALTA) 30 MG capsule [Pharmacy Med Name: DULOXETINE HCL DR 30 MG CAP] 30 capsule 3     Sig: TAKE 1 CAPSULE BY MOUTH EVERY DAY    rosuvastatin (CRESTOR) 5 MG tablet [Pharmacy Med Name: ROSUVASTATIN CALCIUM 5 MG TAB] 30 tablet 6     Sig: TAKE 1 TABLET BY MOUTH EVERY DAY AT NIGHT      Last office visit with prescribing clinician: 8/7/2023   Last telemedicine visit with prescribing clinician: Visit date not found   Next office visit with prescribing clinician: 11/15/2023                         Would you like a call back once the refill request has been completed: [] Yes [] No    If the office needs to give you a call back, can they leave a voicemail: [] Yes [] No    Maurice Barrera MA  10/03/23, 13:16 EDT    Called and spoke with patient to let her know these were sent in 8/7/23 #30 with 11 rfs and to call pharmacy and have them check her profile. Patient verbalized understanding no further questions

## 2023-10-03 NOTE — TELEPHONE ENCOUNTER
Rx Refill Note  Requested Prescriptions     Pending Prescriptions Disp Refills    DULoxetine (CYMBALTA) 30 MG capsule [Pharmacy Med Name: DULOXETINE HCL DR 30 MG CAP] 30 capsule 3     Sig: TAKE 1 CAPSULE BY MOUTH EVERY DAY    rosuvastatin (CRESTOR) 5 MG tablet [Pharmacy Med Name: ROSUVASTATIN CALCIUM 5 MG TAB] 30 tablet 6     Sig: TAKE 1 TABLET BY MOUTH EVERY DAY AT NIGHT      Last office visit with prescribing clinician: 8/7/2023   Last telemedicine visit with prescribing clinician: Visit date not found   Next office visit with prescribing clinician: 11/15/2023                         Would you like a call back once the refill request has been completed: [] Yes [] No    If the office needs to give you a call back, can they leave a voicemail: [] Yes [] No    Za Tian MA  10/03/23, 12:23 EDT

## 2023-11-08 ENCOUNTER — OFFICE VISIT (OUTPATIENT)
Dept: NEUROLOGY | Facility: CLINIC | Age: 63
End: 2023-11-08
Payer: COMMERCIAL

## 2023-11-08 VITALS
OXYGEN SATURATION: 98 % | WEIGHT: 136.8 LBS | HEART RATE: 90 BPM | DIASTOLIC BLOOD PRESSURE: 70 MMHG | SYSTOLIC BLOOD PRESSURE: 120 MMHG | BODY MASS INDEX: 22.79 KG/M2 | HEIGHT: 65 IN

## 2023-11-08 DIAGNOSIS — Z82.0 FAMILY HISTORY OF ALZHEIMER'S DISEASE: ICD-10-CM

## 2023-11-08 DIAGNOSIS — R41.3 MEMORY LOSS: Primary | ICD-10-CM

## 2023-11-08 PROBLEM — H43.393 VITREOUS FLOATERS OF BOTH EYES: Status: ACTIVE | Noted: 2023-11-01

## 2023-11-08 PROBLEM — H25.813 COMBINED FORMS OF AGE-RELATED CATARACT OF BOTH EYES: Status: ACTIVE | Noted: 2023-11-01

## 2023-11-08 PROBLEM — Z98.890 S/P LASIK (LASER ASSISTED IN SITU KERATOMILEUSIS): Status: ACTIVE | Noted: 2023-11-01

## 2023-11-08 PROCEDURE — 99214 OFFICE O/P EST MOD 30 MIN: CPT | Performed by: NURSE PRACTITIONER

## 2023-11-08 RX ORDER — OMEPRAZOLE 20 MG/1
20 CAPSULE, DELAYED RELEASE ORAL DAILY
COMMUNITY

## 2023-11-08 RX ORDER — DOXYCYCLINE HYCLATE 100 MG/1
100 CAPSULE ORAL DAILY
COMMUNITY

## 2023-11-08 NOTE — LETTER
2023     Payton Carey MD  [address]  [address] [address]    Patient: Lisbeth Perdue   YOB: 1960   Date of Visit: 2023     Dear Payton Carey MD:       Thank you for referring Lisbeth Perdue to me for evaluation. Below are the relevant portions of my assessment and plan of care.    If you have questions, please do not hesitate to call me. I look forward to following Lisbeth along with you.         Sincerely,        Sanjuanita Solorio DNP, APRN        CC: No Recipients    Sanjuanita Solorio DNP, APRN  [phone]  Signed     Neuro Office Visit      Encounter Date: 2023   Patient Name: Lisbeth Perdue  : 1960   MRN: [id number]   PCP: Dr Carey  Chief Complaint:    Chief Complaint   Patient presents with   • Memory Loss       History of Present Illness: Lisbeth Perdue is a 63 y.o. female who is here today in Neurology for  memory loss    Memory Loss  MMSE 30/30  Her mother has severe dementia. Pt is worried because she is having word finding difficulties and is slow to  new games and concepts.    Previously was on large doses of GBP for RSD. Was able to wean down until knee injury from pickle ball injury. Taking klonopin, GBP and Duloxetine.    Will have a rare nightmare with screaming. Denies acting out dreams, hallucinations. No missed appts, bills. Has not been lost.  Can operate her cell phone and microwave. No falls in the last year except for pickle ball. No slurred speech or dysphagia. Weight is stable.  No unusual behaviors.    RSD  Taking GBP, duloxetine and klonopin for mouth burning.    MRI Brain Without Contrast (05/15/2023 19:27) -nml  Labs: vit D, tsh, cmp, cmp, vit b12-nml    PMH: RSD, Knee injury, neuropathic pain, bladder prolaps, HRT, burning mouth syndrome. HLD, hypothyroidism, IBS, STEPH, osteopenia  FH:mother w AD  SH:-tob, -etoh, -drug,   Subjective      Past Medical  History:   Past Medical History:   Diagnosis Date   • Anemia    • Burning mouth syndrome    • CRPS (complex regional pain syndrome)     lower limb   • Hyperlipidemia    • Hypothyroidism    • IBS (irritable bowel syndrome)    • Nevus, atypical    • Osteoarthrosis involving lower leg    • Osteopenia    • Pain in joint, lower leg    • RSD lower limb    • Shingles    • Vitamin D deficiency        Past Surgical History:   Past Surgical History:   Procedure Laterality Date   • BUNIONECTOMY     • COLONOSCOPY  10/2016    DR GORE   • FOOT SURGERY Right    • FRACTURE SURGERY     • HYSTERECTOMY  2003   • TONSILLECTOMY         Family History:   Family History   Problem Relation Age of Onset   • Hypertension Mother    • Hypothyroidism Mother    • Constipation Mother    • Depression Mother    • Osteoarthritis Mother    • Cancer Mother         squamous cell skin cancer   • Alzheimer's disease Mother    • Lymphoma Father         non-hodgkins   • Glaucoma Father    • Colon polyps Father    • Depression Father    • Kidney disease Father    • Nephrolithiasis Father    • Hypothyroidism Sister    • Depression Maternal Grandmother    • Heart attack Maternal Grandfather 66   • No Known Problems Paternal Grandmother    • No Known Problems Paternal Grandfather    • Depression Child    • Thyroid cancer Maternal Aunt    • Ovarian cancer Paternal Aunt    • Thyroid cancer Cousin        Social History:   Social History     Socioeconomic History   • Marital status:    Tobacco Use   • Smoking status: Never   • Smokeless tobacco: Never   Vaping Use   • Vaping Use: Never used   Substance and Sexual Activity   • Alcohol use: No   • Drug use: No   • Sexual activity: Defer       Medications:     Current Outpatient Medications:   •  ascorbic acid (VITAMIN C) 1000 MG tablet, Take  by mouth daily., Disp: , Rfl:   •  CALCIUM-VITAMIN D PO, Take  by mouth Daily., Disp: , Rfl:   •  Cholecalciferol (VITAMIN D) 2000 UNITS tablet, Take  by mouth., Disp: ,  Rfl:   •  clonazePAM (KlonoPIN) 0.5 MG tablet, Take 1 tablet by mouth Daily., Disp: 30 tablet, Rfl: 2  •  Cranberry 250 MG tablet, Take 2 tablets by mouth Daily., Disp: , Rfl:   •  D-Mannose 500 MG capsule, Take 3 capsules by mouth Daily. Taking 1 daily., Disp: , Rfl:   •  DULoxetine (CYMBALTA) 30 MG capsule, Take 1 capsule by mouth Daily., Disp: 30 capsule, Rfl: 11  •  estradiol (MINIVELLE, VIVELLE-DOT) 0.05 MG/24HR patch, 1 patch 2 (Two) Times a Week., Disp: , Rfl:   •  Flaxseed, Linseed, (FLAX SEED OIL PO), Take 1,242 mg by mouth Daily., Disp: , Rfl:   •  gabapentin (NEURONTIN) 100 MG capsule, Take 1 capsule by mouth Every Morning AND 2 capsules Daily With Lunch AND 3 capsules Every Night. Decrease by 100mg weekly (Patient taking differently: Taking 2 in the am, two at lunch and 2 at night.), Disp: 180 capsule, Rfl: 2  •  levothyroxine (SYNTHROID, LEVOTHROID) 25 MCG tablet, TAKE 1 TABLET BY MOUTH EVERY DAY, Disp: 90 tablet, Rfl: 1  •  Omega-3 Fatty Acids (FISH OIL) 1000 MG capsule capsule, Take  by mouth daily., Disp: , Rfl:   •  omeprazole (priLOSEC) 20 MG capsule, Take 1 capsule by mouth Daily., Disp: , Rfl:   •  ondansetron (Zofran) 4 MG tablet, Take 1 tablet by mouth Every 6 (Six) Hours As Needed for Nausea or Vomiting (for colonoscopy prep)., Disp: 6 tablet, Rfl: 0  •  Probiotic Product (PROBIOTIC DAILY PO), Take  by mouth., Disp: , Rfl:   •  rosuvastatin (Crestor) 5 MG tablet, Take 1 tablet by mouth Every Night., Disp: 30 tablet, Rfl: 11  •  vitamin B-12 (CYANOCOBALAMIN) 100 MCG tablet, Take 0.5 tablets by mouth Daily., Disp: , Rfl:   •  doxycycline (VIBRAMYCIN) 100 MG capsule, Take 1 capsule by mouth Daily., Disp: , Rfl:     Allergies:   No Known Allergies      PHQ-9 Total Score:     [facility] Fall Risk Assessment has not been completed.    Objective     Physical Exam:   Physical Exam  Eyes:      Pupils: Pupils are equal, round, and reactive to light.   Neurological:      Mental Status: She is oriented to  person, place, and time.      Coordination: Finger-Nose-Finger Test, Heel to Shin Test and Romberg Test normal.      Gait: Gait is intact.      Deep Tendon Reflexes:      Reflex Scores:       Tricep reflexes are 2+ on the right side and 2+ on the left side.       Bicep reflexes are 2+ on the right side and 2+ on the left side.       Brachioradialis reflexes are 2+ on the right side and 2+ on the left side.       Patellar reflexes are 2+ on the right side and 2+ on the left side.       Achilles reflexes are 2+ on the right side and 2+ on the left side.  Psychiatric:         Speech: Speech normal.         Neurologic Exam     Mental Status   Oriented to person, place, and time.   Follows 3 step commands.   Attention: normal. Concentration: normal.   Speech: speech is normal   Level of consciousness: alert  Knowledge: consistent with education.   Normal comprehension.     Cranial Nerves     CN III, IV, VI   Pupils are equal, round, and reactive to light.  Right pupil: Accommodation: intact.   Left pupil: Accommodation: intact.   CN III: no CN III palsy  CN VI: no CN VI palsy  Nystagmus: none   Diplopia: none  Upgaze: normal  Downgaze: normal  Conjugate gaze: present    CN VII   Facial expression full, symmetric.     CN VIII   Hearing: intact    CN XII   CN XII normal.     Motor Exam   Muscle bulk: normal  Overall muscle tone: normal    Strength   Right biceps: 5/5  Left biceps: 5/5  Right triceps: 5/5  Left triceps: 5/5  Right interossei: 5/5  Left interossei: 5/5  Right quadriceps: 5/5  Left quadriceps: 5/5  Right anterior tibial: 5/5  Left anterior tibial: 5/5  Right posterior tibial: 5/5  Left posterior tibial: 5/5    Sensory Exam   Light touch normal.     Gait, Coordination, and Reflexes     Gait  Gait: normal    Coordination   Romberg: negative  Finger to nose coordination: normal  Heel to shin coordination: normal    Tremor   Resting tremor: absent  Action tremor: absent    Reflexes   Right brachioradialis:  "2+  Left brachioradialis: 2+  Right biceps: 2+  Left biceps: 2+  Right triceps: 2+  Left triceps: 2+  Right patellar: 2+  Left patellar: 2+  Right achilles: 2+  Left achilles: 2+  Right : 2+  Left : 2+       Vital Signs:   Vitals:    11/08/23 0905   BP: 120/70   Pulse: 90   SpO2: 98%   Weight: 62.1 kg (136 lb 12.8 oz)   Height: 165.1 cm (65\")     Body mass index is 22.76 kg/m².     Assessment / Plan      Assessment/Plan:   Diagnoses and all orders for this visit:    1. Memory loss (Primary)  Comments:  Reassurance. [place] at next visit. Discussed referral to memory care center at  or [place]. She would like a referral to [facility] and Dr Rodriguez.  Orders:  -     Ambulatory Referral to Neurology    2. Family history of Alzheimer's disease  -     Ambulatory Referral to Neurology     Discussed some measure for brain health such as adequate sleep, treating any sleep apnea, healthy diet low in processed foods, daily exercise. Mental exercises such as learning new concepts, games ect.  Avoid alcohol and tobacco products.    Patient Education:       Reviewed medications, potential side effects and signs and symptoms to report. Discussed risk versus benefits of treatment plan with patient and/or family-including medications, labs and radiology that may be ordered. Addressed questions and concerns during visit. Patient and/or family verbalized understanding and agree with plan. Instructed to call the office with any questions and report to ER with any life-threatening symptoms.     Follow Up:   Return in about 1 year (around 11/8/2024) for Recheck.    During this visit the following were done:  Labs Reviewed [x]    Labs Ordered []    Radiology Reports Reviewed [x]    Radiology Ordered []    PCP Records Reviewed [x]    Referring Provider Records Reviewed []    ER Records Reviewed []    Hospital Records Reviewed []    History Obtained From Family []    Radiology Images Reviewed [x]    Other Reviewed [x]    Records Requested " []      Sanjuanita Solorio, DNP, APRN

## 2023-11-08 NOTE — PROGRESS NOTES
Neuro Office Visit      Encounter Date: 2023   Patient Name: Lisbeth Perdue  : 1960   MRN: [id number]   PCP: Dr Carey  Chief Complaint:    Chief Complaint   Patient presents with    Memory Loss       History of Present Illness: Lisbeth Perdue is a 63 y.o. female who is here today in Neurology for  memory loss    Memory Loss  MMSE 30/30  Her mother has severe dementia. Pt is worried because she is having word finding difficulties and is slow to  new games and concepts.    Previously was on large doses of GBP for RSD. Was able to wean down until knee injury from pickle ball injury. Taking klonopin, GBP and Duloxetine.    Will have a rare nightmare with screaming. Denies acting out dreams, hallucinations. No missed appts, bills. Has not been lost.  Can operate her cell phone and microwave. No falls in the last year except for pickle ball. No slurred speech or dysphagia. Weight is stable.  No unusual behaviors.    RSD  Taking GBP, duloxetine and klonopin for mouth burning.    MRI Brain Without Contrast (05/15/2023 19:27) -nml  Labs: vit D, tsh, cmp, cmp, vit b12-nml    PMH: RSD, Knee injury, neuropathic pain, bladder prolaps, HRT, burning mouth syndrome. HLD, hypothyroidism, IBS, STEPH, osteopenia  FH:mother w AD  SH:-tob, -etoh, -drug,   Subjective      Past Medical History:   Past Medical History:   Diagnosis Date    Anemia     Burning mouth syndrome     CRPS (complex regional pain syndrome)     lower limb    Hyperlipidemia     Hypothyroidism     IBS (irritable bowel syndrome)     Nevus, atypical     Osteoarthrosis involving lower leg     Osteopenia     Pain in joint, lower leg     RSD lower limb     Shingles     Vitamin D deficiency        Past Surgical History:   Past Surgical History:   Procedure Laterality Date    BUNIONECTOMY      COLONOSCOPY  10/2016    DR GORE    FOOT SURGERY Right     FRACTURE SURGERY      HYSTERECTOMY  2003    TONSILLECTOMY         Family  History:   Family History   Problem Relation Age of Onset    Hypertension Mother     Hypothyroidism Mother     Constipation Mother     Depression Mother     Osteoarthritis Mother     Cancer Mother         squamous cell skin cancer    Alzheimer's disease Mother     Lymphoma Father         non-hodgkins    Glaucoma Father     Colon polyps Father     Depression Father     Kidney disease Father     Nephrolithiasis Father     Hypothyroidism Sister     Depression Maternal Grandmother     Heart attack Maternal Grandfather 66    No Known Problems Paternal Grandmother     No Known Problems Paternal Grandfather     Depression Child     Thyroid cancer Maternal Aunt     Ovarian cancer Paternal Aunt     Thyroid cancer Cousin        Social History:   Social History     Socioeconomic History    Marital status:    Tobacco Use    Smoking status: Never    Smokeless tobacco: Never   Vaping Use    Vaping Use: Never used   Substance and Sexual Activity    Alcohol use: No    Drug use: No    Sexual activity: Defer       Medications:     Current Outpatient Medications:     ascorbic acid (VITAMIN C) 1000 MG tablet, Take  by mouth daily., Disp: , Rfl:     CALCIUM-VITAMIN D PO, Take  by mouth Daily., Disp: , Rfl:     Cholecalciferol (VITAMIN D) 2000 UNITS tablet, Take  by mouth., Disp: , Rfl:     clonazePAM (KlonoPIN) 0.5 MG tablet, Take 1 tablet by mouth Daily., Disp: 30 tablet, Rfl: 2    Cranberry 250 MG tablet, Take 2 tablets by mouth Daily., Disp: , Rfl:     D-Mannose 500 MG capsule, Take 3 capsules by mouth Daily. Taking 1 daily., Disp: , Rfl:     DULoxetine (CYMBALTA) 30 MG capsule, Take 1 capsule by mouth Daily., Disp: 30 capsule, Rfl: 11    estradiol (MINIVELLE, VIVELLE-DOT) 0.05 MG/24HR patch, 1 patch 2 (Two) Times a Week., Disp: , Rfl:     Flaxseed, Linseed, (FLAX SEED OIL PO), Take 1,242 mg by mouth Daily., Disp: , Rfl:     gabapentin (NEURONTIN) 100 MG capsule, Take 1 capsule by mouth Every Morning AND 2 capsules Daily With  Lunch AND 3 capsules Every Night. Decrease by 100mg weekly (Patient taking differently: Taking 2 in the am, two at lunch and 2 at night.), Disp: 180 capsule, Rfl: 2    levothyroxine (SYNTHROID, LEVOTHROID) 25 MCG tablet, TAKE 1 TABLET BY MOUTH EVERY DAY, Disp: 90 tablet, Rfl: 1    Omega-3 Fatty Acids (FISH OIL) 1000 MG capsule capsule, Take  by mouth daily., Disp: , Rfl:     omeprazole (priLOSEC) 20 MG capsule, Take 1 capsule by mouth Daily., Disp: , Rfl:     ondansetron (Zofran) 4 MG tablet, Take 1 tablet by mouth Every 6 (Six) Hours As Needed for Nausea or Vomiting (for colonoscopy prep)., Disp: 6 tablet, Rfl: 0    Probiotic Product (PROBIOTIC DAILY PO), Take  by mouth., Disp: , Rfl:     rosuvastatin (Crestor) 5 MG tablet, Take 1 tablet by mouth Every Night., Disp: 30 tablet, Rfl: 11    vitamin B-12 (CYANOCOBALAMIN) 100 MCG tablet, Take 0.5 tablets by mouth Daily., Disp: , Rfl:     doxycycline (VIBRAMYCIN) 100 MG capsule, Take 1 capsule by mouth Daily., Disp: , Rfl:     Allergies:   No Known Allergies      PHQ-9 Total Score:     [facility] has not [facility].    Objective     Physical Exam:   Physical Exam  Eyes:      Pupils: Pupils are equal, round, and reactive to light.   Neurological:      Mental Status: She is oriented to person, place, and time.      Coordination: Finger-Nose-Finger Test, Heel to Shin Test and Romberg Test normal.      Gait: Gait is intact.      Deep Tendon Reflexes:      Reflex Scores:       Tricep reflexes are 2+ on the right side and 2+ on the left side.       Bicep reflexes are 2+ on the right side and 2+ on the left side.       Brachioradialis reflexes are 2+ on the right side and 2+ on the left side.       Patellar reflexes are 2+ on the right side and 2+ on the left side.       Achilles reflexes are 2+ on the right side and 2+ on the left side.  Psychiatric:         Speech: Speech normal.         Neurologic Exam     Mental Status   Oriented to person, place, and  "time.   Follows 3 step commands.   Attention: normal. Concentration: normal.   Speech: speech is normal   Level of consciousness: alert  Knowledge: consistent with education.   Normal comprehension.     Cranial Nerves     CN III, IV, VI   Pupils are equal, round, and reactive to light.  Right pupil: Accommodation: intact.   Left pupil: Accommodation: intact.   CN III: no CN III palsy  CN VI: no CN VI palsy  Nystagmus: none   Diplopia: none  Upgaze: normal  Downgaze: normal  Conjugate gaze: present    CN VII   Facial expression full, symmetric.     CN VIII   Hearing: intact    CN XII   CN XII normal.     Motor Exam   Muscle bulk: normal  Overall muscle tone: normal    Strength   Right biceps: 5/5  Left biceps: 5/5  Right triceps: 5/5  Left triceps: 5/5  Right interossei: 5/5  Left interossei: 5/5  Right quadriceps: 5/5  Left quadriceps: 5/5  Right anterior tibial: 5/5  Left anterior tibial: 5/5  Right posterior tibial: 5/5  Left posterior tibial: 5/5    Sensory Exam   Light touch normal.     Gait, Coordination, and Reflexes     Gait  Gait: normal    Coordination   Romberg: negative  Finger to nose coordination: normal  Heel to shin coordination: normal    Tremor   Resting tremor: absent  Action tremor: absent    Reflexes   Right brachioradialis: 2+  Left brachioradialis: 2+  Right biceps: 2+  Left biceps: 2+  Right triceps: 2+  Left triceps: 2+  Right patellar: 2+  Left patellar: 2+  Right achilles: 2+  Left achilles: 2+  Right : 2+  Left : 2+       Vital Signs:   Vitals:    11/08/23 0905   BP: 120/70   Pulse: 90   SpO2: 98%   Weight: 62.1 kg (136 lb 12.8 oz)   Height: 165.1 cm (65\")     Body mass index is 22.76 kg/m².     Assessment / Plan      Assessment/Plan:   Diagnoses and all orders for this visit:    1. Memory loss (Primary)  Comments:  Reassurance. [place] at next visit. Discussed referral to memory care center at  or [place]. She would like a referral to [facility] and Dr Rodriguez.  Orders:  -     Ambulatory " Referral to Neurology    2. Family history of Alzheimer's disease  -     Ambulatory Referral to Neurology     Discussed some measure for brain health such as adequate sleep, treating any sleep apnea, healthy diet low in processed foods, daily exercise. Mental exercises such as learning new concepts, games ect.  Avoid alcohol and tobacco products.    Patient Education:       Reviewed medications, potential side effects and signs and symptoms to report. Discussed risk versus benefits of treatment plan with patient and/or family-including medications, labs and radiology that may be ordered. Addressed questions and concerns during visit. Patient and/or family verbalized understanding and agree with plan. Instructed to call the office with any questions and report to ER with any life-threatening symptoms.     Follow Up:   Return in about 1 year (around 11/8/2024) for Recheck.    During this visit the following were done:  Labs Reviewed [x]    Labs Ordered []    Radiology Reports Reviewed [x]    Radiology Ordered []    PCP Records Reviewed [x]    Referring Provider Records Reviewed []    ER Records Reviewed []    Hospital Records Reviewed []    History Obtained From Family []    Radiology Images Reviewed [x]    Other Reviewed [x]    Records Requested []      Sanjuanita Solorio, DNP, APRN

## 2023-11-15 ENCOUNTER — OFFICE VISIT (OUTPATIENT)
Age: 63
End: 2023-11-15
Payer: COMMERCIAL

## 2023-11-15 VITALS
WEIGHT: 137.9 LBS | SYSTOLIC BLOOD PRESSURE: 118 MMHG | HEIGHT: 65 IN | OXYGEN SATURATION: 98 % | BODY MASS INDEX: 22.98 KG/M2 | DIASTOLIC BLOOD PRESSURE: 76 MMHG | HEART RATE: 67 BPM

## 2023-11-15 DIAGNOSIS — M54.50 ACUTE MIDLINE LOW BACK PAIN WITHOUT SCIATICA: ICD-10-CM

## 2023-11-15 DIAGNOSIS — K21.9 LARYNGOPHARYNGEAL REFLUX (LPR): ICD-10-CM

## 2023-11-15 DIAGNOSIS — Z79.899 CONTROLLED SUBSTANCE AGREEMENT SIGNED: ICD-10-CM

## 2023-11-15 DIAGNOSIS — K14.6 BURNING MOUTH SYNDROME: Chronic | ICD-10-CM

## 2023-11-15 DIAGNOSIS — Z51.81 THERAPEUTIC DRUG MONITORING: ICD-10-CM

## 2023-11-15 DIAGNOSIS — K57.90 DIVERTICULOSIS: ICD-10-CM

## 2023-11-15 DIAGNOSIS — G90.50 RSD (REFLEX SYMPATHETIC DYSTROPHY): Primary | Chronic | ICD-10-CM

## 2023-11-15 DIAGNOSIS — E03.9 ACQUIRED HYPOTHYROIDISM: Chronic | ICD-10-CM

## 2023-11-15 PROBLEM — H00.014 HORDEOLUM EXTERNUM OF LEFT UPPER EYELID: Status: ACTIVE | Noted: 2023-11-15

## 2023-11-15 RX ORDER — GABAPENTIN 100 MG/1
CAPSULE ORAL
Qty: 120 CAPSULE | Refills: 2 | Status: SHIPPED | OUTPATIENT
Start: 2023-11-15

## 2023-11-15 RX ORDER — LEVOTHYROXINE SODIUM 0.03 MG/1
TABLET ORAL
Qty: 90 TABLET | Refills: 1 | Status: SHIPPED | OUTPATIENT
Start: 2023-11-15

## 2023-11-15 RX ORDER — MELOXICAM 15 MG/1
15 TABLET ORAL DAILY
COMMUNITY

## 2023-11-15 RX ORDER — CLONAZEPAM 0.5 MG/1
0.5 TABLET ORAL NIGHTLY
Qty: 30 TABLET | Refills: 2 | Status: SHIPPED | OUTPATIENT
Start: 2023-11-15

## 2023-11-15 NOTE — PROGRESS NOTES
"Chief Complaint  Chronic pain    Subjective        Lisbeth Perdue presents to [facility]  History of Present Illness    Fiber powder 2 spoons in her coffee in the morning. She has more difficult bowel movements. She has urinary frequency and urgency that she has to limit to drink water. Increased gas and bloating.     She had mammogram this morning. Yesterday GYN exam.     She saw ophthalmology for annual exam and internal stye cleared up. She tried to decrease doxycyline but left stye returned.     She restarted omeprazole with cough, hoarse voice, and chest pain. No heartburn. Decreasing acid, coffee, chocolate, carbonated beverages, and caffeine.     She is having hearing screen tomorrow.     Sitting on hard bench at dinner and leaning forward trying to hear conversation she had low back/sacrum pain. She continues to have to pain. She started meloxicam for 4-5 days.     Taking gabapentin 200mg 3 times a day for awhile and reduced to 200mg morning and bedtime.     She also has PCP in [place] for controlled refills when [place] of state.         Objective   Vital Signs:  /76   Pulse 67   Ht 165.1 cm (65\")   Wt 62.6 kg (137 lb 14.4 oz)   SpO2 98%   BMI 22.95 kg/m²   Estimated body mass index is 22.95 kg/m² as calculated from the following:    Height as of this encounter: 165.1 cm (65\").    Weight as of this encounter: 62.6 kg (137 lb 14.4 oz).       BMI is within normal parameters. No other follow-up for BMI required.      Physical Exam  Vitals reviewed.   Constitutional:       General: She is not in acute distress.     Appearance: She is not ill-appearing.   Cardiovascular:      Rate and Rhythm: Normal rate and regular rhythm.   Pulmonary:      Effort: Pulmonary effort is normal.      Breath sounds: Normal breath sounds.   Musculoskeletal:      Lumbar back: No bony tenderness.        Legs:    Neurological:      Mental Status: She is alert.   Psychiatric:        "  Mood and Affect: Affect is tearful.        Result Review :                   Assessment and Plan   Diagnoses and all orders for this visit:    1. RSD (reflex sympathetic dystrophy) (Primary)  -     gabapentin (NEURONTIN) 100 MG capsule; Take 2 capsules by mouth Every Morning AND 2 capsules every night at bedtime. Decrease by 100mg weekly  Dispense: 120 capsule; Refill: 2  Continue gabapentin 400 mg daily with divided doses.  Recheck in 3 months or see local PCP [place]state for next refill  2. Burning mouth syndrome  -     clonazePAM (KlonoPIN) 0.5 MG tablet; Take 1 tablet by mouth Every Night.  Dispense: 30 tablet; Refill: 2  Continue clonazepam. Recheck in 3 months or see local PCP [place]-of-state for next refill  3. Diverticulosis  Patient has difficulty increasing her fiber secondary to bloating abdominal pain and constipation.  Try reducing fiber by half it and supplement.  Increase water intake when at home and near a bathroom.  4. Laryngopharyngeal reflux (LPR)  Restart PPI periodically.  After a time that she is well controlled consider going off PPI again.  Use PPI when she is on NSAIDs.  5. Acute midline low back pain without sciatica  New.  Continue using meloxicam for pain.  Use PPI as well.  6. Acquired hypothyroidism  -     levothyroxine (SYNTHROID, LEVOTHROID) 25 MCG tablet; TAKE 1 TABLET BY MOUTH EVERY DAY  Dispense: 90 tablet; Refill: 1  Continue levothyroxine.  7. Therapeutic drug monitoring  -     ToxAssure Flex 23, Ur -; Future    8. Controlled substance agreement signed             Follow Up   Return in about 3 months (around 2/15/2024) for Office visit chronic pain.  Patient was given instructions and counseling regarding her condition or for health maintenance advice. Please see specific information pulled into the AVS if appropriate.     Electronically signed by Payton Carey MD, 11/15/23, 4:40 PM EST.

## 2023-11-21 ENCOUNTER — TELEPHONE (OUTPATIENT)
Age: 63
End: 2023-11-21
Payer: COMMERCIAL

## 2023-11-21 DIAGNOSIS — R92.8 ABNORMAL MAMMOGRAM: Primary | ICD-10-CM

## 2023-11-21 NOTE — TELEPHONE ENCOUNTER
[facility] called. Patient had an abnormal mammogram and they are needing more diagnostic imaging. They need a right diagnostic mammogram and right ultrasound. Orders need to be faxed to [phone]

## 2023-12-22 ENCOUNTER — LAB (OUTPATIENT)
Age: 63
End: 2023-12-22
Payer: COMMERCIAL

## 2023-12-22 DIAGNOSIS — Z51.81 ENCOUNTER FOR THERAPEUTIC DRUG MONITORING: Primary | ICD-10-CM

## 2023-12-22 PROCEDURE — 36415 COLL VENOUS BLD VENIPUNCTURE: CPT | Performed by: FAMILY MEDICINE

## 2023-12-27 LAB
1OH-MIDAZOLAM UR QL SCN: NOT DETECTED NG/MG CREAT
6MAM UR QL SCN: NEGATIVE NG/ML
7AMINOCLONAZEPAM/CREAT UR: 92 NG/MG CREAT
A-OH ALPRAZ/CREAT UR: NOT DETECTED NG/MG CREAT
A-OH-TRIAZOLAM/CREAT UR CFM: NOT DETECTED NG/MG CREAT
ACP UR QL CFM: NOT DETECTED
ALPRAZ/CREAT UR CFM: NOT DETECTED NG/MG CREAT
AMPHETAMINES UR QL SCN: NEGATIVE NG/ML
APAP UR QL SCN: NEGATIVE UG/ML
BARBITURATES UR QL SCN: NEGATIVE NG/ML
BENZODIAZ SCN METH UR: NORMAL
BUPRENORPHINE UR QL SCN: NEGATIVE
BUPRENORPHINE/CREAT UR: NOT DETECTED NG/MG CREAT
CANNABINOIDS UR QL SCN: NEGATIVE NG/ML
CARISOPRODOL UR QL: NEGATIVE NG/ML
CLONAZEPAM/CREAT UR CFM: NOT DETECTED NG/MG CREAT
COCAINE+BZE UR QL SCN: NEGATIVE NG/ML
CREAT UR-MCNC: 100 MG/DL
D-METHORPHAN UR-MCNC: NOT DETECTED NG/ML
D-METHORPHAN+LEVORPHANOL UR QL: NOT DETECTED
DESALKYLFLURAZ/CREAT UR: NOT DETECTED NG/MG CREAT
DIAZEPAM/CREAT UR: NOT DETECTED NG/MG CREAT
ETHANOL UR QL SCN: NEGATIVE G/DL
ETHANOL UR QL SCN: NEGATIVE NG/ML
FENTANYL CTO UR SCN-MCNC: NEGATIVE NG/ML
FENTANYL/CREAT UR: NOT DETECTED NG/MG CREAT
FLUNITRAZEPAM UR QL SCN: NOT DETECTED NG/MG CREAT
GABAPENTIN UR CFM-MCNC: PRESENT NG/ML
GABAPENTIN UR QL CFM: NORMAL
GABAPENTIN UR-MCNC: NORMAL UG/ML
HYPNOTICS UR QL SCN: NEGATIVE
KETAMINE UR QL: NOT DETECTED
LORAZEPAM/CREAT UR: NOT DETECTED NG/MG CREAT
MEPERIDINE UR QL SCN: NEGATIVE NG/ML
METHADONE UR QL SCN: NEGATIVE NG/ML
METHADONE+METAB UR QL SCN: NEGATIVE NG/ML
MIDAZOLAM/CREAT UR CFM: NOT DETECTED NG/MG CREAT
MISCELLANEOUS, UR: NEGATIVE
NORBUPRENORPHINE/CREAT UR: NOT DETECTED NG/MG CREAT
NORDIAZEPAM/CREAT UR: NOT DETECTED NG/MG CREAT
NORFENTANYL/CREAT UR: NOT DETECTED NG/MG CREAT
NORFLUNITRAZEPAM UR-MCNC: NOT DETECTED NG/MG CREAT
NORKETAMINE UR-MCNC: NOT DETECTED UG/ML
OPIATES UR SCN-MCNC: NEGATIVE NG/ML
OTHER HALLUCINOGENS UR: NEGATIVE
OXAZEPAM/CREAT UR: NOT DETECTED NG/MG CREAT
OXYCODONE CTO UR SCN-MCNC: NEGATIVE NG/ML
PCP UR QL SCN: NEGATIVE NG/ML
PRESCRIBED MEDICATIONS: NORMAL
PRESCRIBED MEDICATIONS: NORMAL
PROPOXYPH UR QL SCN: NEGATIVE NG/ML
TAPENTADOL CTO UR SCN-MCNC: NEGATIVE NG/ML
TEMAZEPAM/CREAT UR: NOT DETECTED NG/MG CREAT
TRAMADOL UR QL SCN: NEGATIVE NG/ML
ZALEPLON UR-MCNC: NOT DETECTED NG/ML
ZOLPIDEM PHENYL-4-CARB UR QL SCN: NOT DETECTED
ZOLPIDEM UR QL SCN: NOT DETECTED
ZOPICLONE-N-OXIDE UR-MCNC: NOT DETECTED NG/ML

## 2024-02-12 ENCOUNTER — OFFICE VISIT (OUTPATIENT)
Age: 64
End: 2024-02-12
Payer: COMMERCIAL

## 2024-02-12 VITALS
HEIGHT: 65 IN | WEIGHT: 138.7 LBS | DIASTOLIC BLOOD PRESSURE: 70 MMHG | BODY MASS INDEX: 23.11 KG/M2 | OXYGEN SATURATION: 97 % | SYSTOLIC BLOOD PRESSURE: 112 MMHG | HEART RATE: 79 BPM

## 2024-02-12 DIAGNOSIS — K14.6 BURNING MOUTH SYNDROME: Chronic | ICD-10-CM

## 2024-02-12 DIAGNOSIS — E03.9 ACQUIRED HYPOTHYROIDISM: Chronic | ICD-10-CM

## 2024-02-12 DIAGNOSIS — J06.9 ACUTE URI: ICD-10-CM

## 2024-02-12 DIAGNOSIS — G90.50 RSD (REFLEX SYMPATHETIC DYSTROPHY): Primary | Chronic | ICD-10-CM

## 2024-02-12 LAB
EXPIRATION DATE: NORMAL
FLUAV AG UPPER RESP QL IA.RAPID: NOT DETECTED
FLUAV RNA RESP QL NAA+PROBE: NOT DETECTED
FLUBV AG UPPER RESP QL IA.RAPID: NOT DETECTED
FLUBV RNA RESP QL NAA+PROBE: NOT DETECTED
INTERNAL CONTROL: NORMAL
Lab: NORMAL
RSV RNA RESP QL NAA+PROBE: NOT DETECTED
SARS-COV-2 AG UPPER RESP QL IA.RAPID: NOT DETECTED
SARS-COV-2 RNA RESP QL NAA+PROBE: NOT DETECTED

## 2024-02-12 PROCEDURE — 87428 SARSCOV & INF VIR A&B AG IA: CPT | Performed by: FAMILY MEDICINE

## 2024-02-12 PROCEDURE — 87637 SARSCOV2&INF A&B&RSV AMP PRB: CPT | Performed by: FAMILY MEDICINE

## 2024-02-12 PROCEDURE — 99214 OFFICE O/P EST MOD 30 MIN: CPT | Performed by: FAMILY MEDICINE

## 2024-02-12 RX ORDER — CLONAZEPAM 0.5 MG/1
0.5 TABLET ORAL NIGHTLY
Qty: 30 TABLET | Refills: 2 | Status: SHIPPED | OUTPATIENT
Start: 2024-02-12

## 2024-02-12 RX ORDER — LEVOTHYROXINE SODIUM 0.03 MG/1
TABLET ORAL
Qty: 30 TABLET | Refills: 5 | Status: SHIPPED | OUTPATIENT
Start: 2024-02-12

## 2024-02-12 RX ORDER — GABAPENTIN 100 MG/1
CAPSULE ORAL
Qty: 120 CAPSULE | Refills: 2 | Status: SHIPPED | OUTPATIENT
Start: 2024-02-12

## 2024-05-01 RX ORDER — MELOXICAM 15 MG/1
15 TABLET ORAL DAILY
Qty: 30 TABLET | Refills: 5 | Status: SHIPPED | OUTPATIENT
Start: 2024-05-01

## 2024-05-01 NOTE — TELEPHONE ENCOUNTER
Rx Refill Note  Requested Prescriptions     Pending Prescriptions Disp Refills    meloxicam (MOBIC) 15 MG tablet [Pharmacy Med Name: MELOXICAM 15 MG TABLET] 30 tablet 5     Sig: TAKE 1 TABLET BY MOUTH EVERY DAY      Last office visit with prescribing clinician: 2/12/2024   Last telemedicine visit with prescribing clinician: Visit date not found   Next office visit with prescribing clinician: 5/13/2024                         Would you like a call back once the refill request has been completed: [] Yes [] No    If the office needs to give you a call back, can they leave a voicemail: [] Yes [] No    Maurice Barrera MA  05/01/24, 08:39 EDT

## 2024-05-13 ENCOUNTER — OFFICE VISIT (OUTPATIENT)
Age: 64
End: 2024-05-13
Payer: COMMERCIAL

## 2024-05-13 ENCOUNTER — LAB (OUTPATIENT)
Age: 64
End: 2024-05-13
Payer: COMMERCIAL

## 2024-05-13 VITALS
HEIGHT: 65 IN | OXYGEN SATURATION: 99 % | WEIGHT: 136 LBS | SYSTOLIC BLOOD PRESSURE: 110 MMHG | HEART RATE: 86 BPM | BODY MASS INDEX: 22.66 KG/M2 | DIASTOLIC BLOOD PRESSURE: 68 MMHG

## 2024-05-13 DIAGNOSIS — K14.6 BURNING MOUTH SYNDROME: Primary | Chronic | ICD-10-CM

## 2024-05-13 DIAGNOSIS — E78.00 PURE HYPERCHOLESTEROLEMIA: Chronic | ICD-10-CM

## 2024-05-13 DIAGNOSIS — G90.50 RSD (REFLEX SYMPATHETIC DYSTROPHY): Chronic | ICD-10-CM

## 2024-05-13 DIAGNOSIS — E03.9 ACQUIRED HYPOTHYROIDISM: Primary | ICD-10-CM

## 2024-05-13 DIAGNOSIS — E03.9 ACQUIRED HYPOTHYROIDISM: Chronic | ICD-10-CM

## 2024-05-13 DIAGNOSIS — F41.1 GAD (GENERALIZED ANXIETY DISORDER): Chronic | ICD-10-CM

## 2024-05-13 DIAGNOSIS — K21.9 GASTROESOPHAGEAL REFLUX DISEASE, UNSPECIFIED WHETHER ESOPHAGITIS PRESENT: ICD-10-CM

## 2024-05-13 PROCEDURE — 84439 ASSAY OF FREE THYROXINE: CPT | Performed by: FAMILY MEDICINE

## 2024-05-13 PROCEDURE — 36415 COLL VENOUS BLD VENIPUNCTURE: CPT | Performed by: FAMILY MEDICINE

## 2024-05-13 PROCEDURE — 99214 OFFICE O/P EST MOD 30 MIN: CPT | Performed by: FAMILY MEDICINE

## 2024-05-13 PROCEDURE — 84443 ASSAY THYROID STIM HORMONE: CPT | Performed by: FAMILY MEDICINE

## 2024-05-13 RX ORDER — CLONAZEPAM 0.5 MG/1
0.5 TABLET ORAL NIGHTLY
Qty: 30 TABLET | Refills: 2 | Status: SHIPPED | OUTPATIENT
Start: 2024-05-13

## 2024-05-13 RX ORDER — GABAPENTIN 100 MG/1
100 CAPSULE ORAL NIGHTLY
Qty: 30 CAPSULE | Refills: 2 | Status: SHIPPED | OUTPATIENT
Start: 2024-05-13

## 2024-05-13 RX ORDER — ALBUTEROL SULFATE 90 UG/1
2 AEROSOL, METERED RESPIRATORY (INHALATION) EVERY 4 HOURS PRN
COMMUNITY
Start: 2024-04-22

## 2024-05-13 RX ORDER — LEVOTHYROXINE SODIUM 0.03 MG/1
TABLET ORAL
Qty: 30 TABLET | Refills: 11 | Status: SHIPPED | OUTPATIENT
Start: 2024-05-13

## 2024-05-13 RX ORDER — DULOXETIN HYDROCHLORIDE 30 MG/1
30 CAPSULE, DELAYED RELEASE ORAL DAILY
Qty: 30 CAPSULE | Refills: 11 | Status: SHIPPED | OUTPATIENT
Start: 2024-05-13

## 2024-05-13 RX ORDER — PANTOPRAZOLE SODIUM 40 MG/1
40 TABLET, DELAYED RELEASE ORAL NIGHTLY
Qty: 30 TABLET | Refills: 11 | Status: SHIPPED | OUTPATIENT
Start: 2024-05-13

## 2024-05-13 RX ORDER — ROSUVASTATIN CALCIUM 5 MG/1
5 TABLET, COATED ORAL NIGHTLY
Qty: 30 TABLET | Refills: 11 | Status: SHIPPED | OUTPATIENT
Start: 2024-05-13

## 2024-05-13 NOTE — PROGRESS NOTES
"Chief Complaint  Pain (Chronic pain follow-up) and Heartburn (Feels as omeprazole is not helping GERD)    Subjective        Lisbeth Perdue presents to [facility]  Pain  This is a chronic problem.   Heartburn  This is a chronic problem. The problem has been gradually worsening. She has tried a PPI for the symptoms. The treatment provided no relief.     Weaning off gabapentin to once a night. Has increased right knee pain. She fell twice playing pickle ball.     Acid reflux increased. No current heartburn. Last couple weeks flared up like inflammation in her chest and uncomfortable. 3-4 days ago coughed all day long with GERD cough. Voice was lower. Tasted blood but never coughed up blood. She takes omeprazole 20mg in the morning. She cut out caffeine and decaf tea.     Noticed at pedicure her toenails are splitting. Stopped toenail polish for awhile. Left pinky and thumbnails are split. She has artificial nails.     When she was in [place], she slept 8 hours but doesn't feel good when she wakes up. Not much energy and sometimes headache.         Objective   Vital Signs:  /68   Pulse 86   Ht 165.1 cm (65\")   Wt 61.7 kg (136 lb)   SpO2 99%   BMI 22.63 kg/m²   Estimated body mass index is 22.63 kg/m² as calculated from the following:    Height as of this encounter: 165.1 cm (65\").    Weight as of this encounter: 61.7 kg (136 lb).       BMI is within normal parameters. No other follow-up for BMI required.      Physical Exam  Vitals reviewed.   Constitutional:       General: She is not in acute distress.     Appearance: She is not ill-appearing.   Cardiovascular:      Rate and Rhythm: Normal rate and regular rhythm.   Pulmonary:      Effort: Pulmonary effort is normal.      Breath sounds: Normal breath sounds.   Abdominal:      Palpations: Abdomen is soft.      Tenderness: There is no abdominal tenderness.   Neurological:      Mental Status: She is alert.   Psychiatric:  "        Mood and Affect: Mood normal.        Result Review :                     Assessment and Plan     Diagnoses and all orders for this visit:    1. Burning mouth syndrome (Primary)  -     clonazePAM (KlonoPIN) 0.5 MG tablet; Take 1 tablet by mouth Every Night.  Dispense: 30 tablet; Refill: 2  Stable.  Continue clonazepam.  2. RSD (reflex sympathetic dystrophy)  -     gabapentin (NEURONTIN) 100 MG capsule; Take 1 capsule by mouth Every Night. Decrease by 100mg weekly  Dispense: 30 capsule; Refill: 2  Stable.  Patient has weaned down gabapentin to only 100 mg daily.  Recheck in 3 months.  3. Pure hypercholesterolemia  -     rosuvastatin (Crestor) 5 MG tablet; Take 1 tablet by mouth Every Night.  Dispense: 30 tablet; Refill: 11  Continue statin.  4. STEPH (generalized anxiety disorder)  -     DULoxetine (CYMBALTA) 30 MG capsule; Take 1 capsule by mouth Daily.  Dispense: 30 capsule; Refill: 11  Continue duloxetine.  5. Acquired hypothyroidism  -     levothyroxine (SYNTHROID, LEVOTHROID) 25 MCG tablet; TAKE 1 TABLET BY MOUTH EVERY DAY  Dispense: 30 tablet; Refill: 11  -     TSH; Future  -     T4, Free; Future  Check thyroid function and adjust levothyroxine dose if needed.  6. Gastroesophageal reflux disease, unspecified whether esophagitis present  -     pantoprazole (PROTONIX) 40 MG EC tablet; Take 1 tablet by mouth Every Night.  Dispense: 30 tablet; Refill: 11  Uncontrolled.  Switch from omeprazole to pantoprazole.  If not improved after 2 weeks consider further testing with EGD.  Handout provided for GERD diet.    The treatment plan includes a controlled substance.  Controlled Substance Medication Agreement signed and on file. Reviewed UDS.  Risks, benefits, and alternative treatments reviewed.  NICKY report 05/13/24 has been reviewed.             Follow Up     Return in about 3 months (around 8/16/2024) for Physical and fasting labs, as scheduled.  Patient was given instructions and counseling regarding her  condition or for health maintenance advice. Please see specific information pulled into the AVS if appropriate.     Electronically signed by Payton Carey MD, 05/13/24, 1:57 PM EDT.

## 2024-05-14 LAB
T4 FREE SERPL-MCNC: 1.23 NG/DL (ref 0.93–1.7)
TSH SERPL DL<=0.05 MIU/L-ACNC: 1.52 UIU/ML (ref 0.27–4.2)

## 2024-06-03 ENCOUNTER — PATIENT MESSAGE (OUTPATIENT)
Age: 64
End: 2024-06-03
Payer: COMMERCIAL

## 2024-06-03 DIAGNOSIS — K21.9 GASTROESOPHAGEAL REFLUX DISEASE, UNSPECIFIED WHETHER ESOPHAGITIS PRESENT: Primary | ICD-10-CM

## 2024-07-01 ENCOUNTER — OUTSIDE FACILITY SERVICE (OUTPATIENT)
Dept: GASTROENTEROLOGY | Facility: CLINIC | Age: 64
End: 2024-07-01
Payer: COMMERCIAL

## 2024-07-01 PROCEDURE — 88305 TISSUE EXAM BY PATHOLOGIST: CPT | Performed by: INTERNAL MEDICINE

## 2024-07-02 ENCOUNTER — LAB REQUISITION (OUTPATIENT)
Dept: LAB | Facility: HOSPITAL | Age: 64
End: 2024-07-02
Payer: COMMERCIAL

## 2024-07-02 DIAGNOSIS — K21.9 GASTRO-ESOPHAGEAL REFLUX DISEASE WITHOUT ESOPHAGITIS: ICD-10-CM

## 2024-07-03 ENCOUNTER — TELEPHONE (OUTPATIENT)
Dept: GASTROENTEROLOGY | Facility: CLINIC | Age: 64
End: 2024-07-03
Payer: COMMERCIAL

## 2024-07-03 LAB — REF LAB TEST METHOD: NORMAL

## 2024-07-15 ENCOUNTER — TELEPHONE (OUTPATIENT)
Age: 64
End: 2024-07-15
Payer: COMMERCIAL

## 2024-07-15 DIAGNOSIS — E03.9 ACQUIRED HYPOTHYROIDISM: ICD-10-CM

## 2024-07-15 DIAGNOSIS — Z00.00 WELL ADULT EXAM: Primary | ICD-10-CM

## 2024-07-15 DIAGNOSIS — E78.00 PURE HYPERCHOLESTEROLEMIA: Chronic | ICD-10-CM

## 2024-07-15 DIAGNOSIS — E55.9 VITAMIN D DEFICIENCY: Chronic | ICD-10-CM

## 2024-07-15 NOTE — TELEPHONE ENCOUNTER
Patient's physical is scheduled for 8/20. Will you go ahead and put her lab orders in so she can complete before the appt?

## 2024-08-05 ENCOUNTER — PATIENT MESSAGE (OUTPATIENT)
Age: 64
End: 2024-08-05
Payer: COMMERCIAL

## 2024-08-05 DIAGNOSIS — K14.6 BURNING MOUTH SYNDROME: ICD-10-CM

## 2024-08-05 DIAGNOSIS — Z00.00 WELL ADULT EXAM: Primary | ICD-10-CM

## 2024-08-05 DIAGNOSIS — G90.50 RSD (REFLEX SYMPATHETIC DYSTROPHY): ICD-10-CM

## 2024-08-12 RX ORDER — GABAPENTIN 100 MG/1
100 CAPSULE ORAL NIGHTLY
Qty: 30 CAPSULE | Refills: 2 | Status: SHIPPED | OUTPATIENT
Start: 2024-08-12

## 2024-08-12 RX ORDER — CLONAZEPAM 0.5 MG/1
0.5 TABLET ORAL NIGHTLY
Qty: 30 TABLET | Refills: 2 | Status: SHIPPED | OUTPATIENT
Start: 2024-08-12

## 2024-08-13 ENCOUNTER — LAB (OUTPATIENT)
Dept: LAB | Facility: HOSPITAL | Age: 64
End: 2024-08-13
Payer: COMMERCIAL

## 2024-08-13 DIAGNOSIS — E55.9 VITAMIN D DEFICIENCY: Chronic | ICD-10-CM

## 2024-08-13 DIAGNOSIS — Z00.00 WELL ADULT EXAM: ICD-10-CM

## 2024-08-13 DIAGNOSIS — E78.00 PURE HYPERCHOLESTEROLEMIA: Chronic | ICD-10-CM

## 2024-08-13 DIAGNOSIS — E03.9 ACQUIRED HYPOTHYROIDISM: ICD-10-CM

## 2024-08-13 LAB
25(OH)D3 SERPL-MCNC: 38.6 NG/ML (ref 30–100)
ALBUMIN SERPL-MCNC: 4.3 G/DL (ref 3.5–5.2)
ALBUMIN/GLOB SERPL: 2 G/DL
ALP SERPL-CCNC: 33 U/L (ref 39–117)
ALT SERPL W P-5'-P-CCNC: 17 U/L (ref 1–33)
ANION GAP SERPL CALCULATED.3IONS-SCNC: 7.2 MMOL/L (ref 5–15)
AST SERPL-CCNC: 24 U/L (ref 1–32)
BILIRUB SERPL-MCNC: 0.5 MG/DL (ref 0–1.2)
BUN SERPL-MCNC: 14 MG/DL (ref 8–23)
BUN/CREAT SERPL: 13.7 (ref 7–25)
CALCIUM SPEC-SCNC: 9.7 MG/DL (ref 8.6–10.5)
CHLORIDE SERPL-SCNC: 102 MMOL/L (ref 98–107)
CHOLEST SERPL-MCNC: 163 MG/DL (ref 0–200)
CO2 SERPL-SCNC: 27.8 MMOL/L (ref 22–29)
CREAT SERPL-MCNC: 1.02 MG/DL (ref 0.57–1)
DEPRECATED RDW RBC AUTO: 40.4 FL (ref 37–54)
EGFRCR SERPLBLD CKD-EPI 2021: 61.6 ML/MIN/1.73
ERYTHROCYTE [DISTWIDTH] IN BLOOD BY AUTOMATED COUNT: 12.3 % (ref 12.3–15.4)
GLOBULIN UR ELPH-MCNC: 2.2 GM/DL
GLUCOSE SERPL-MCNC: 84 MG/DL (ref 65–99)
HBA1C MFR BLD: 5.2 % (ref 4.8–5.6)
HCT VFR BLD AUTO: 38.1 % (ref 34–46.6)
HDLC SERPL-MCNC: 91 MG/DL (ref 40–60)
HGB BLD-MCNC: 12.5 G/DL (ref 12–15.9)
LDLC SERPL CALC-MCNC: 61 MG/DL (ref 0–100)
LDLC/HDLC SERPL: 0.67 {RATIO}
MCH RBC QN AUTO: 29.5 PG (ref 26.6–33)
MCHC RBC AUTO-ENTMCNC: 32.8 G/DL (ref 31.5–35.7)
MCV RBC AUTO: 89.9 FL (ref 79–97)
PLATELET # BLD AUTO: 250 10*3/MM3 (ref 140–450)
PMV BLD AUTO: 11.4 FL (ref 6–12)
POTASSIUM SERPL-SCNC: 5.5 MMOL/L (ref 3.5–5.2)
PROT SERPL-MCNC: 6.5 G/DL (ref 6–8.5)
RBC # BLD AUTO: 4.24 10*6/MM3 (ref 3.77–5.28)
SODIUM SERPL-SCNC: 137 MMOL/L (ref 136–145)
T4 FREE SERPL-MCNC: 1.4 NG/DL (ref 0.92–1.68)
TRIGL SERPL-MCNC: 53 MG/DL (ref 0–150)
TSH SERPL DL<=0.05 MIU/L-ACNC: 3.02 UIU/ML (ref 0.27–4.2)
VLDLC SERPL-MCNC: 11 MG/DL (ref 5–40)
WBC NRBC COR # BLD AUTO: 5.23 10*3/MM3 (ref 3.4–10.8)

## 2024-08-13 PROCEDURE — 83036 HEMOGLOBIN GLYCOSYLATED A1C: CPT

## 2024-08-13 PROCEDURE — 82306 VITAMIN D 25 HYDROXY: CPT

## 2024-08-13 PROCEDURE — 84439 ASSAY OF FREE THYROXINE: CPT

## 2024-08-13 PROCEDURE — 80050 GENERAL HEALTH PANEL: CPT

## 2024-08-13 PROCEDURE — 80061 LIPID PANEL: CPT

## 2024-08-15 ENCOUNTER — TELEPHONE (OUTPATIENT)
Age: 64
End: 2024-08-15
Payer: COMMERCIAL

## 2024-08-15 NOTE — TELEPHONE ENCOUNTER
With a mild elevation in the potassium level I would like to repeat the test when you are in the office next week and decide on a further management plan.

## 2024-08-15 NOTE — TELEPHONE ENCOUNTER
----- Message from Payton Carey  Please call patient about high potassium.  I also sent a message in [employer].     Potassium electrolyte is mildly elevated.  Are you taking any over-the-counter potassium or multivitamin?    RELAYED RESULTS PER PROVIDER. PATIENT STATED SHE IS NOT TAKING ANYTHING WITH POTASSIUM IN IT. TOLD PATIENT I WOULD MAKE PCP AWARE.

## 2024-09-04 ENCOUNTER — LAB (OUTPATIENT)
Age: 64
End: 2024-09-04
Payer: COMMERCIAL

## 2024-09-04 ENCOUNTER — OFFICE VISIT (OUTPATIENT)
Age: 64
End: 2024-09-04
Payer: COMMERCIAL

## 2024-09-04 VITALS
WEIGHT: 127.44 LBS | OXYGEN SATURATION: 98 % | HEIGHT: 65 IN | HEART RATE: 68 BPM | BODY MASS INDEX: 21.23 KG/M2 | DIASTOLIC BLOOD PRESSURE: 74 MMHG | SYSTOLIC BLOOD PRESSURE: 120 MMHG

## 2024-09-04 DIAGNOSIS — E78.00 PURE HYPERCHOLESTEROLEMIA: ICD-10-CM

## 2024-09-04 DIAGNOSIS — F41.1 GAD (GENERALIZED ANXIETY DISORDER): ICD-10-CM

## 2024-09-04 DIAGNOSIS — E87.5 HYPERKALEMIA: ICD-10-CM

## 2024-09-04 DIAGNOSIS — Z82.49 FH: HEART DISEASE: ICD-10-CM

## 2024-09-04 DIAGNOSIS — Z51.81 THERAPEUTIC DRUG MONITORING: ICD-10-CM

## 2024-09-04 DIAGNOSIS — K14.6 BURNING MOUTH SYNDROME: ICD-10-CM

## 2024-09-04 DIAGNOSIS — G90.50 RSD (REFLEX SYMPATHETIC DYSTROPHY): ICD-10-CM

## 2024-09-04 DIAGNOSIS — Z79.899 CONTROLLED SUBSTANCE AGREEMENT SIGNED: ICD-10-CM

## 2024-09-04 DIAGNOSIS — E87.5 HYPERPOTASSEMIA: Primary | ICD-10-CM

## 2024-09-04 DIAGNOSIS — E03.9 ACQUIRED HYPOTHYROIDISM: ICD-10-CM

## 2024-09-04 DIAGNOSIS — Z00.00 WELL ADULT EXAM: Primary | ICD-10-CM

## 2024-09-04 DIAGNOSIS — K21.9 GASTROESOPHAGEAL REFLUX DISEASE, UNSPECIFIED WHETHER ESOPHAGITIS PRESENT: ICD-10-CM

## 2024-09-04 PROCEDURE — 80048 BASIC METABOLIC PNL TOTAL CA: CPT | Performed by: FAMILY MEDICINE

## 2024-09-04 PROCEDURE — 36415 COLL VENOUS BLD VENIPUNCTURE: CPT | Performed by: FAMILY MEDICINE

## 2024-09-04 PROCEDURE — 99396 PREV VISIT EST AGE 40-64: CPT | Performed by: FAMILY MEDICINE

## 2024-09-04 RX ORDER — LORATADINE 10 MG/1
10 TABLET ORAL DAILY
COMMUNITY

## 2024-09-04 RX ORDER — DULOXETIN HYDROCHLORIDE 30 MG/1
30 CAPSULE, DELAYED RELEASE ORAL DAILY
Qty: 30 CAPSULE | Refills: 11 | Status: SHIPPED | OUTPATIENT
Start: 2024-09-04

## 2024-09-04 RX ORDER — VITAMIN E 268 MG
400 CAPSULE ORAL DAILY
COMMUNITY

## 2024-09-04 RX ORDER — GABAPENTIN 100 MG/1
100 CAPSULE ORAL NIGHTLY
Qty: 30 CAPSULE | Refills: 2 | Status: SHIPPED | OUTPATIENT
Start: 2024-09-04

## 2024-09-04 RX ORDER — WHEAT DEXTRIN 3 G/4 G
1 POWDER IN PACKET (EA) ORAL DAILY
COMMUNITY

## 2024-09-04 RX ORDER — ROSUVASTATIN CALCIUM 5 MG/1
5 TABLET, COATED ORAL NIGHTLY
Qty: 30 TABLET | Refills: 11 | Status: SHIPPED | OUTPATIENT
Start: 2024-09-04

## 2024-09-04 RX ORDER — NIACIN 500 MG
500 TABLET ORAL NIGHTLY
COMMUNITY

## 2024-09-04 RX ORDER — IMIQUIMOD 12.5 MG/.25G
1 CREAM TOPICAL TAKE AS DIRECTED
COMMUNITY

## 2024-09-04 RX ORDER — PANTOPRAZOLE SODIUM 20 MG/1
40 TABLET, DELAYED RELEASE ORAL NIGHTLY
Qty: 30 TABLET | Refills: 11 | Status: SHIPPED | OUTPATIENT
Start: 2024-09-04

## 2024-09-04 RX ORDER — CLONAZEPAM 0.5 MG/1
0.5 TABLET ORAL NIGHTLY
Qty: 30 TABLET | Refills: 2 | Status: SHIPPED | OUTPATIENT
Start: 2024-09-04

## 2024-09-04 RX ORDER — LEVOTHYROXINE SODIUM 25 UG/1
TABLET ORAL
Qty: 30 TABLET | Refills: 11 | Status: SHIPPED | OUTPATIENT
Start: 2024-09-04

## 2024-09-04 RX ORDER — DOCUSATE SODIUM 100 MG/1
100 CAPSULE, LIQUID FILLED ORAL NIGHTLY
COMMUNITY

## 2024-09-04 NOTE — PROGRESS NOTES
"Chief Complaint  Annual Exam    Subjective          Lisbeth Perdue presents to [facility] for   History of Present Illness    She has mammogram scheduled in December at .     She has calcium 1200mg and vitamin D 1600 IU.     She drinks water. She has 1 cup of coffee in the morning.     She has overactive bladder with frequency, urgency and difficulty emptying her bladder. Prior hysterectomy. She started pelvic PT but hasn't continued home exercises.     Ears are bugging her. Left worse.     She saw neurology about dementia prevention clinic at [facility]. Mediterranean diet and exercise recommended. Recommended coronary artery calcium score.  sometimes reports snoring. She is sometimes tired when she wakes up and fatigue in the afternoon. She is not ready for sleep study at this time.         Objective   Vital Signs:   Vitals:    09/04/24 1119   BP: 120/74   Pulse: 68   SpO2: 98%   Weight: 57.8 kg (127 lb 7 oz)   Height: 165.1 cm (65\")     Body mass index is 21.21 kg/m².    BMI is within normal parameters. No other follow-up for BMI required.          Physical Exam  Constitutional:       General: She is not in acute distress.  HENT:      Right Ear: Tympanic membrane, ear canal and external ear normal. There is no impacted cerumen.      Left Ear: Tympanic membrane, ear canal and external ear normal. There is no impacted cerumen.      Nose: No congestion or rhinorrhea.      Mouth/Throat:      Mouth: Mucous membranes are moist.      Pharynx: No oropharyngeal exudate or posterior oropharyngeal erythema.   Eyes:      General:         Right eye: No discharge.         Left eye: No discharge.      Conjunctiva/sclera: Conjunctivae normal.   Neck:      Thyroid: No thyromegaly.   Cardiovascular:      Rate and Rhythm: Normal rate and regular rhythm.   Pulmonary:      Effort: Pulmonary effort is normal.      Breath sounds: Normal breath sounds.   Abdominal:      " Palpations: Abdomen is soft. There is no hepatomegaly.      Tenderness: There is no abdominal tenderness.   Musculoskeletal:      Cervical back: Neck supple.   Lymphadenopathy:      Head:      Right side of head: No submandibular, preauricular or posterior auricular adenopathy.      Left side of head: No submandibular, preauricular or posterior auricular adenopathy.      Cervical: No cervical adenopathy.   Skin:     General: Skin is warm.   Neurological:      Mental Status: She is alert and oriented to person, place, and time.      Gait: Gait normal.   Psychiatric:         Mood and Affect: Mood normal.         Behavior: Behavior normal.         Thought Content: Thought content normal.         Judgment: Judgment normal.        Result Review :   The following data was reviewed by: Payton Carey MD on 09/04/2024:  Common labs          8/13/2024    08:42   Common Labs   Glucose 84    BUN 14    Creatinine 1.02    Sodium 137    Potassium 5.5    Chloride 102    Calcium 9.7    Albumin 4.3    Total Bilirubin 0.5    Alkaline Phosphatase 33    AST (SGOT) 24    ALT (SGPT) 17    WBC 5.23    Hemoglobin 12.5    Hematocrit 38.1    Platelets 250    Total Cholesterol 163    Triglycerides 53    HDL Cholesterol 91    LDL Cholesterol  61    Hemoglobin A1C 5.20      TSH          5/13/2024    13:56 8/13/2024    08:42   TSH   TSH 1.520  3.020      A1C Last 3 Results          8/13/2024    08:42   HGBA1C Last 3 Results   Hemoglobin A1C 5.20             T4, Free (08/13/2024 08:42)  Vitamin D,25-Hydroxy (08/13/2024 08:42)    Immunization History   Administered Date(s) Administered    COVID-19 (PFIZER) BIVALENT 12+YRS 10/19/2022    COVID-19 (PFIZER) Purple Cap Monovalent 01/18/2021, 02/08/2021, 11/02/2021, 07/15/2022    COVID-19 (UNSPECIFIED) 10/17/2023    Covid-19 (Pfizer) Gray Cap Monovalent 07/15/2022    Fluzone (or Fluarix & Flulaval for VFC) >6mos 10/17/2017, 10/09/2020, 09/24/2021, 10/19/2022    Influenza Injectable Mdck Pf Quad  10/19/2022, 10/17/2023    Influenza Split Preservative Free ID 12/30/2013    Influenza, Unspecified 10/05/2020    RSV, unspecified (Vaccine or MAB) 10/17/2023    Shingrix 07/06/2021, 09/07/2021    Tdap 12/23/2011, 09/08/2017    Zoster, Unspecified 07/06/2021    flucelvax quad pfs =>4 YRS 10/08/2019       Health Maintenance   Topic Date Due    ANNUAL PHYSICAL  08/07/2024    INFLUENZA VACCINE  08/01/2024    MAMMOGRAM  12/07/2024    LIPID PANEL  08/13/2025    DXA SCAN  09/22/2025    TDAP/TD VACCINES (3 - Td or Tdap) 09/08/2027    COLORECTAL CANCER SCREENING  08/31/2028    HEPATITIS C SCREENING  Completed    COVID-19 Vaccine  Completed    ZOSTER VACCINE  Completed    Pneumococcal Vaccine 0-64  Aged Out    PAP SMEAR  Discontinued            Assessment and Plan    Diagnoses and all orders for this visit:    1. Well adult exam (Primary)  Reviewed previsit labs with patient in detail.  Colon cancer screening colonoscopy up-to-date.  Breast cancer screening mammogram scheduled for December.  No Pap smear needed status post hysterectomy.  Recommend COVID-19 and influenza vaccines.  2. Burning mouth syndrome  -     clonazePAM (KlonoPIN) 0.5 MG tablet; Take 1 tablet by mouth Every Night.  Dispense: 30 tablet; Refill: 2  Stable.  3. STEPH (generalized anxiety disorder)  -     DULoxetine (CYMBALTA) 30 MG capsule; Take 1 capsule by mouth Daily.  Dispense: 30 capsule; Refill: 11  Stable.  4. RSD (reflex sympathetic dystrophy)  -     gabapentin (NEURONTIN) 100 MG capsule; Take 1 capsule by mouth Every Night. Decrease by 100mg weekly  Dispense: 30 capsule; Refill: 2  Stable.  5. Acquired hypothyroidism  -     levothyroxine (SYNTHROID, LEVOTHROID) 25 MCG tablet; TAKE 1 TABLET BY MOUTH EVERY DAY  Dispense: 30 tablet; Refill: 11  Stable.  6. Gastroesophageal reflux disease, unspecified whether esophagitis present  -     pantoprazole (PROTONIX) 20 MG EC tablet; Take 2 tablets by mouth Every Night.  Dispense: 30 tablet; Refill: 11  Stable.   Decrease to pantoprazole 20 mg daily.  May wean off the medication every other day to 2 times a week and then stop.  Discussed long-term risks of PPI.  7. Pure hypercholesterolemia  -     rosuvastatin (Crestor) 5 MG tablet; Take 1 tablet by mouth Every Night.  Dispense: 30 tablet; Refill: 11  -     CT Cardiac Calcium Score Without Dye; Future  Stable.  LDL goal less than 100.  She is interested in coronary artery calcium scoring if covered by insurance.  8. FH: heart disease  -     CT Cardiac Calcium Score Without Dye; Future    9. Hyperkalemia  -     Basic Metabolic Panel; Future  Check follow-up labs today.  10. Controlled substance agreement signed  The treatment plan includes a controlled substance.  Controlled Substance Medication Agreement signed and on file. Ordered UDS.  Risks, benefits, and alternative treatments reviewed.  NICKY report 09/04/24 has been reviewed.    11. Therapeutic drug monitoring  -     ToxAssure Flex 23, Ur -; Future  -     ToxAssure Flex 23, Ur - Urine, Clean Catch        Counseling/anticipatory guidance: Nutrition,  mental health, immunizations, screenings      Follow Up   Return in about 15 weeks (around 12/18/2024) for Office visit chronic pain AND , Welcome AWV and fasting labs 1 YEAR.  Patient was given instructions and counseling regarding her condition or for health maintenance advice. Please see specific information pulled into the AVS if appropriate.     Electronically signed by Payton Carey MD, 09/04/24, 1:03 PM EDT.

## 2024-09-05 LAB
ANION GAP SERPL CALCULATED.3IONS-SCNC: 11.7 MMOL/L (ref 5–15)
BUN SERPL-MCNC: 13 MG/DL (ref 8–23)
BUN/CREAT SERPL: 13.8 (ref 7–25)
CALCIUM SPEC-SCNC: 9.9 MG/DL (ref 8.6–10.5)
CHLORIDE SERPL-SCNC: 98 MMOL/L (ref 98–107)
CO2 SERPL-SCNC: 24.3 MMOL/L (ref 22–29)
CREAT SERPL-MCNC: 0.94 MG/DL (ref 0.57–1)
EGFRCR SERPLBLD CKD-EPI 2021: 67.9 ML/MIN/1.73
GLUCOSE SERPL-MCNC: 115 MG/DL (ref 65–99)
POTASSIUM SERPL-SCNC: 4.4 MMOL/L (ref 3.5–5.2)
SODIUM SERPL-SCNC: 134 MMOL/L (ref 136–145)

## 2024-09-07 LAB
1OH-MIDAZOLAM UR QL SCN: NOT DETECTED NG/MG CREAT
6MAM UR QL SCN: NEGATIVE NG/ML
7AMINOCLONAZEPAM/CREAT UR: 75 NG/MG CREAT
A-OH ALPRAZ/CREAT UR: NOT DETECTED NG/MG CREAT
A-OH-TRIAZOLAM/CREAT UR CFM: NOT DETECTED NG/MG CREAT
ACP UR QL CFM: NOT DETECTED
ALPRAZ/CREAT UR CFM: NOT DETECTED NG/MG CREAT
AMPHETAMINES UR QL SCN: NEGATIVE NG/ML
APAP UR QL SCN: NEGATIVE UG/ML
BARBITURATES UR QL SCN: NEGATIVE NG/ML
BENZODIAZ SCN METH UR: NORMAL
BUPRENORPHINE UR QL SCN: NEGATIVE
BUPRENORPHINE/CREAT UR: NOT DETECTED NG/MG CREAT
CANNABINOIDS UR QL SCN: NEGATIVE NG/ML
CARISOPRODOL UR QL: NEGATIVE NG/ML
CLONAZEPAM/CREAT UR CFM: NOT DETECTED NG/MG CREAT
COCAINE+BZE UR QL SCN: NEGATIVE NG/ML
CREAT UR-MCNC: 63 MG/DL
D-METHORPHAN UR-MCNC: NOT DETECTED NG/ML
D-METHORPHAN+LEVORPHANOL UR QL: NOT DETECTED
DESALKYLFLURAZ/CREAT UR: NOT DETECTED NG/MG CREAT
DIAZEPAM/CREAT UR: NOT DETECTED NG/MG CREAT
ETHANOL UR QL SCN: NEGATIVE G/DL
ETHANOL UR QL SCN: NEGATIVE NG/ML
FENTANYL CTO UR SCN-MCNC: NEGATIVE NG/ML
FENTANYL/CREAT UR: NOT DETECTED NG/MG CREAT
FLUNITRAZEPAM UR QL SCN: NOT DETECTED NG/MG CREAT
GABAPENTIN UR CFM-MCNC: PRESENT NG/ML
GABAPENTIN UR QL CFM: NORMAL
GABAPENTIN UR-MCNC: NORMAL UG/ML
HALLUCINOGENS UR: NEGATIVE
HYPNOTICS UR QL SCN: NEGATIVE
KETAMINE UR QL: NOT DETECTED
LORAZEPAM/CREAT UR: NOT DETECTED NG/MG CREAT
MEPERIDINE UR QL SCN: NEGATIVE NG/ML
METHADONE UR QL SCN: NEGATIVE NG/ML
METHADONE+METAB UR QL SCN: NEGATIVE NG/ML
MIDAZOLAM/CREAT UR CFM: NOT DETECTED NG/MG CREAT
MISCELLANEOUS, UR: NEGATIVE
NORBUPRENORPHINE/CREAT UR: NOT DETECTED NG/MG CREAT
NORDIAZEPAM/CREAT UR: NOT DETECTED NG/MG CREAT
NORFENTANYL/CREAT UR: NOT DETECTED NG/MG CREAT
NORFLUNITRAZEPAM UR-MCNC: NOT DETECTED NG/MG CREAT
NORKETAMINE UR-MCNC: NOT DETECTED UG/ML
OPIATES UR SCN-MCNC: NEGATIVE NG/ML
OXAZEPAM/CREAT UR: NOT DETECTED NG/MG CREAT
OXYCODONE CTO UR SCN-MCNC: NEGATIVE NG/ML
PCP UR QL SCN: NEGATIVE NG/ML
PRESCRIBED MEDICATIONS: NORMAL
PROPOXYPH UR QL SCN: NEGATIVE NG/ML
TAPENTADOL CTO UR SCN-MCNC: NEGATIVE NG/ML
TEMAZEPAM/CREAT UR: NOT DETECTED NG/MG CREAT
TRAMADOL UR QL SCN: NEGATIVE NG/ML
ZALEPLON UR-MCNC: NOT DETECTED NG/ML
ZOLPIDEM PHENYL-4-CARB UR QL SCN: NOT DETECTED
ZOLPIDEM UR QL SCN: NOT DETECTED
ZOPICLONE-N-OXIDE UR-MCNC: NOT DETECTED NG/ML

## 2024-10-09 ENCOUNTER — PATIENT MESSAGE (OUTPATIENT)
Age: 64
End: 2024-10-09
Payer: COMMERCIAL

## 2024-10-11 ENCOUNTER — OFFICE VISIT (OUTPATIENT)
Age: 64
End: 2024-10-11
Payer: COMMERCIAL

## 2024-10-11 VITALS
HEART RATE: 80 BPM | SYSTOLIC BLOOD PRESSURE: 124 MMHG | DIASTOLIC BLOOD PRESSURE: 76 MMHG | BODY MASS INDEX: 21.94 KG/M2 | HEIGHT: 65 IN | OXYGEN SATURATION: 100 % | WEIGHT: 131.7 LBS

## 2024-10-11 DIAGNOSIS — R39.15 URINARY URGENCY: ICD-10-CM

## 2024-10-11 DIAGNOSIS — M62.89 PELVIC FLOOR DYSFUNCTION IN FEMALE: ICD-10-CM

## 2024-10-11 DIAGNOSIS — R35.0 URINARY FREQUENCY: Primary | ICD-10-CM

## 2024-10-11 LAB
BILIRUB BLD-MCNC: NEGATIVE MG/DL
CLARITY, POC: CLEAR
COLOR UR: YELLOW
EXPIRATION DATE: ABNORMAL
GLUCOSE UR STRIP-MCNC: NEGATIVE MG/DL
KETONES UR QL: NEGATIVE
LEUKOCYTE EST, POC: NEGATIVE
Lab: ABNORMAL
NITRITE UR-MCNC: NEGATIVE MG/ML
PH UR: 6 [PH] (ref 5–8)
PROT UR STRIP-MCNC: NEGATIVE MG/DL
RBC # UR STRIP: NEGATIVE /UL
SP GR UR: 1.01 (ref 1–1.03)
UROBILINOGEN UR QL: NORMAL

## 2024-10-11 PROCEDURE — 81003 URINALYSIS AUTO W/O SCOPE: CPT | Performed by: NURSE PRACTITIONER

## 2024-10-11 PROCEDURE — 99213 OFFICE O/P EST LOW 20 MIN: CPT | Performed by: NURSE PRACTITIONER

## 2024-10-11 RX ORDER — OXYBUTYNIN CHLORIDE 5 MG/1
5 TABLET, EXTENDED RELEASE ORAL DAILY
Qty: 30 TABLET | Refills: 0 | Status: SHIPPED | OUTPATIENT
Start: 2024-10-11

## 2024-10-11 NOTE — PROGRESS NOTES
"Chief Complaint  Urinary Frequency    Subjective        Lisbeth Perdue presents to [facility]  History of Present Illness  Pt is here today with concerns of urinary frequency. She states she is getting up twice during the night to urinate. She has given up coffee due to frequent urination. She has increased symptoms with caffeine intake. She also reports urgency and leakage. She has been to pelvic floor therapy a couple of years ago. She has not been doing kegal exercises regularly. She had a new episode of nocturnal incontinence a few nights ago; this was the first incident of this happening. She would like to try medication to help with symptoms. We discussed that medication may be helpful, but I still recommend that she see Urology. She agrees.    She will be leaving for [place] in a few days. She would like to see Urology, but will not be back in [place] until April. She would be interested in pelvic floor therapy at that time. I will place Urology referral for her today.       Objective   Vital Signs:  /76 (BP Location: Right arm, Patient Position: Sitting, Cuff Size: Adult)   Pulse 80   Ht 165.1 cm (65\")   Wt 59.7 kg (131 lb 11.2 oz)   SpO2 100%   BMI 21.92 kg/m²   Estimated body mass index is 21.92 kg/m² as calculated from the following:    Height as of this encounter: 165.1 cm (65\").    Weight as of this encounter: 59.7 kg (131 lb 11.2 oz).    BMI is within normal parameters. No other follow-up for BMI required.      Physical Exam  Vitals reviewed.   Constitutional:       Appearance: Normal appearance.   HENT:      Nose: Nose normal.      Mouth/Throat:      Mouth: Mucous membranes are moist.      Pharynx: Oropharynx is clear.   Eyes:      Conjunctiva/sclera: Conjunctivae normal.   Cardiovascular:      Rate and Rhythm: Normal rate and regular rhythm.      Heart sounds: Normal heart sounds.   Pulmonary:      Effort: Pulmonary effort is normal.      Breath sounds: " Normal breath sounds.   Abdominal:      Tenderness: There is no abdominal tenderness.   Musculoskeletal:         General: Normal range of motion.      Cervical back: Normal range of motion.   Skin:     General: Skin is warm.   Neurological:      Mental Status: She is alert and oriented to person, place, and time.   Psychiatric:         Mood and Affect: Mood normal.         Behavior: Behavior normal.         Thought Content: Thought content normal.        Result Review :             Results for orders placed or performed in visit on 10/11/24   POC Urinalysis Dipstick, Automated    Specimen: Urine   Result Value Ref Range    Color Yellow Yellow, Straw, Dark Yellow, Arlette    Clarity, UA Clear Clear    Specific Gravity  1.015 1.005 - 1.030    pH, Urine 6.0 5.0 - 8.0    Leukocytes Negative Negative    Nitrite, UA Negative Negative    Protein, POC Negative Negative mg/dL    Glucose, UA Negative Negative mg/dL    Ketones, UA Negative Negative    Urobilinogen, UA Normal Normal, 0.2 E.U./dL    Bilirubin Negative Negative    Blood, UA Negative Negative    Lot Number [id number]     Expiration Date 2025-06-29           Assessment and Plan   Diagnoses and all orders for this visit:    1. Urinary frequency (Primary)  -     POC Urinalysis Dipstick, Automated    2. Urinary urgency    3. Pelvic floor dysfunction in female  -     Ambulatory Referral to Urology  -     oxybutynin XL (Ditropan XL) 5 MG 24 hr tablet; Take 1 tablet by mouth Daily.  Dispense: 30 tablet; Refill: 0           The following portions of the patient's history were reviewed and updated as appropriate: allergies, current medications, past family history, past medical history, past social history, past surgical history, and problem list.       Follow Up   No follow-ups on file.  Patient was given instructions and counseling regarding her condition or for health maintenance advice. Please see specific information pulled into the AVS if appropriate.

## 2024-11-04 ENCOUNTER — TELEPHONE (OUTPATIENT)
Age: 64
End: 2024-11-04
Payer: COMMERCIAL

## 2024-11-04 DIAGNOSIS — M62.89 PELVIC FLOOR DYSFUNCTION IN FEMALE: ICD-10-CM

## 2024-11-04 RX ORDER — OXYBUTYNIN CHLORIDE 5 MG/1
5 TABLET, EXTENDED RELEASE ORAL DAILY
Qty: 90 TABLET | Refills: 0 | Status: SHIPPED | OUTPATIENT
Start: 2024-11-04

## 2024-11-04 RX ORDER — OXYBUTYNIN CHLORIDE 5 MG/1
5 TABLET, EXTENDED RELEASE ORAL DAILY
Qty: 30 TABLET | Refills: 0 | OUTPATIENT
Start: 2024-11-04

## 2024-11-06 ENCOUNTER — PATIENT ROUNDING (BHMG ONLY) (OUTPATIENT)
Dept: URGENT CARE | Facility: CLINIC | Age: 64
End: 2024-11-06
Payer: COMMERCIAL

## 2024-11-13 ENCOUNTER — OFFICE VISIT (OUTPATIENT)
Age: 64
End: 2024-11-13
Payer: COMMERCIAL

## 2024-11-13 ENCOUNTER — TELEPHONE (OUTPATIENT)
Age: 64
End: 2024-11-13

## 2024-11-13 ENCOUNTER — HOSPITAL ENCOUNTER (OUTPATIENT)
Facility: HOSPITAL | Age: 64
Discharge: HOME | End: 2024-11-13
Admitting: FAMILY MEDICINE

## 2024-11-13 VITALS
HEART RATE: 83 BPM | BODY MASS INDEX: 21.72 KG/M2 | HEIGHT: 65 IN | OXYGEN SATURATION: 98 % | WEIGHT: 130.38 LBS | DIASTOLIC BLOOD PRESSURE: 80 MMHG | RESPIRATION RATE: 18 BRPM | SYSTOLIC BLOOD PRESSURE: 126 MMHG | TEMPERATURE: 98.6 F

## 2024-11-13 DIAGNOSIS — Z82.49 FH: HEART DISEASE: ICD-10-CM

## 2024-11-13 DIAGNOSIS — J22 ACUTE LOWER RESPIRATORY INFECTION: Primary | ICD-10-CM

## 2024-11-13 DIAGNOSIS — E78.00 PURE HYPERCHOLESTEROLEMIA: ICD-10-CM

## 2024-11-13 PROCEDURE — 75571 CT HRT W/O DYE W/CA TEST: CPT

## 2024-11-13 PROCEDURE — 99213 OFFICE O/P EST LOW 20 MIN: CPT | Performed by: FAMILY MEDICINE

## 2024-11-13 RX ORDER — AZITHROMYCIN 250 MG/1
TABLET, FILM COATED ORAL
Qty: 6 TABLET | Refills: 0 | Status: SHIPPED | OUTPATIENT
Start: 2024-11-13 | End: 2024-11-18

## 2024-11-13 NOTE — TELEPHONE ENCOUNTER
. Provider: JITENDRA MACIAS    Caller: Lisbeth Perdue    Relationship to Patient: Self      Phone Number: [phone]     Reason for Call: PATIENT THINKS SHE LEFT HER LIGHT WEIGH BLACK JACKET IN THE EXAM ROOM. IF FOUND PLEASE LET HER KNOW, SHE WILL COME

## 2024-11-15 ENCOUNTER — PATIENT MESSAGE (OUTPATIENT)
Age: 64
End: 2024-11-15
Payer: COMMERCIAL

## 2024-11-15 DIAGNOSIS — K76.9 LIVER LESION: Primary | ICD-10-CM

## 2024-11-18 ENCOUNTER — PATIENT MESSAGE (OUTPATIENT)
Age: 64
End: 2024-11-18
Payer: COMMERCIAL

## 2024-11-18 RX ORDER — FLUCONAZOLE 150 MG/1
150 TABLET ORAL ONCE
Qty: 2 TABLET | Refills: 0 | Status: SHIPPED | OUTPATIENT
Start: 2024-11-18 | End: 2024-11-18

## 2024-11-25 ENCOUNTER — HOSPITAL ENCOUNTER (OUTPATIENT)
Dept: MRI IMAGING | Facility: HOSPITAL | Age: 64
Discharge: HOME | End: 2024-11-25
Admitting: FAMILY MEDICINE
Payer: COMMERCIAL

## 2024-11-25 DIAGNOSIS — K76.9 LIVER LESION: ICD-10-CM

## 2024-11-25 PROCEDURE — 25510000002 GADOBENATE DIMEGLUMINE 529 MG/ML SOLUTION: Performed by: FAMILY MEDICINE

## 2024-11-25 PROCEDURE — A9577 INJ MULTIHANCE: HCPCS | Performed by: FAMILY MEDICINE

## 2024-11-25 PROCEDURE — 74183 MRI ABD W/O CNTR FLWD CNTR: CPT

## 2024-11-25 RX ADMIN — GADOBENATE DIMEGLUMINE 12 ML: 529 INJECTION, SOLUTION INTRAVENOUS at 20:05

## 2024-12-18 ENCOUNTER — OFFICE VISIT (OUTPATIENT)
Age: 64
End: 2024-12-18
Payer: COMMERCIAL

## 2024-12-18 ENCOUNTER — HOSPITAL ENCOUNTER (OUTPATIENT)
Facility: HOSPITAL | Age: 64
Discharge: HOME | End: 2024-12-18
Admitting: FAMILY MEDICINE
Payer: COMMERCIAL

## 2024-12-18 VITALS
HEIGHT: 65 IN | DIASTOLIC BLOOD PRESSURE: 76 MMHG | HEART RATE: 83 BPM | SYSTOLIC BLOOD PRESSURE: 122 MMHG | WEIGHT: 129.38 LBS | OXYGEN SATURATION: 97 % | BODY MASS INDEX: 21.56 KG/M2

## 2024-12-18 DIAGNOSIS — G90.50 RSD (REFLEX SYMPATHETIC DYSTROPHY): ICD-10-CM

## 2024-12-18 DIAGNOSIS — R10.13 EPIGASTRIC PAIN: ICD-10-CM

## 2024-12-18 DIAGNOSIS — K21.9 LARYNGOPHARYNGEAL REFLUX (LPR): ICD-10-CM

## 2024-12-18 DIAGNOSIS — R05.3 CHRONIC COUGH: ICD-10-CM

## 2024-12-18 DIAGNOSIS — K14.6 BURNING MOUTH SYNDROME: Primary | ICD-10-CM

## 2024-12-18 DIAGNOSIS — K21.9 GASTROESOPHAGEAL REFLUX DISEASE, UNSPECIFIED WHETHER ESOPHAGITIS PRESENT: ICD-10-CM

## 2024-12-18 PROBLEM — H04.129 DRY EYE: Status: ACTIVE | Noted: 2024-10-31

## 2024-12-18 PROCEDURE — 99214 OFFICE O/P EST MOD 30 MIN: CPT | Performed by: FAMILY MEDICINE

## 2024-12-18 PROCEDURE — 71046 X-RAY EXAM CHEST 2 VIEWS: CPT

## 2024-12-18 RX ORDER — PANTOPRAZOLE SODIUM 40 MG/1
40 TABLET, DELAYED RELEASE ORAL 2 TIMES DAILY
Qty: 180 TABLET | Refills: 3 | Status: SHIPPED | OUTPATIENT
Start: 2024-12-18

## 2024-12-18 RX ORDER — GABAPENTIN 100 MG/1
100 CAPSULE ORAL NIGHTLY
Qty: 30 CAPSULE | Refills: 2 | Status: CANCELLED | OUTPATIENT
Start: 2024-12-18

## 2024-12-18 RX ORDER — GABAPENTIN 100 MG/1
100 CAPSULE ORAL NIGHTLY PRN
COMMUNITY

## 2024-12-18 RX ORDER — CLONAZEPAM 0.5 MG/1
0.5 TABLET ORAL NIGHTLY
Qty: 30 TABLET | Refills: 2 | Status: SHIPPED | OUTPATIENT
Start: 2024-12-18

## 2024-12-18 NOTE — PROGRESS NOTES
Chief Complaint  Pain    Subjective        Lisbeth Perdue presents to [facility]  History of Present Illness    History of Present Illness  The patient is a 64-year-old female presenting for follow-up on RSD, chronic pain, and burning mouth syndrome.    She has been managing her chronic pain with gabapentin, using it as needed. However, due to an upcoming MRI for a liver issue, she resumed a nightly dose of one gabapentin to acclimate her body to the medication. Prior to the MRI, she took clonazepam and three gabapentin approximately two hours before the procedure. She is currently in the process of tapering off gabapentin, now taking it every other night. She reports that her supply of gabapentin is sufficient, with one refill remaining. She also requests a refill of clonazepam, which she uses regularly to manage her oral symptoms.    She experienced a respiratory illness since her last visit, which required two courses of antibiotics, Bactrim and Z-Rosendo. Despite this treatment, she continues to experience symptoms suggestive of chronic inflammation in her chest, including a cough and morning hoarseness. She also reports a change in her voice. Her cough is predominantly nonproductive, with occasional mucus production. She has been maintaining hydration with water and has abstained from coffee and alcohol. Her diet includes yogurt for breakfast, and she is currently on pantoprazole 40 mg, taken an hour after levothyroxine and 30 minutes before eating. She reports no issues with heartburn but continues to experience a chronic cough. She recalls an episode of throat burning after consuming a spicy dinner a few nights ago. She also reports an incident last week where she experienced shooting pain under her rib cage while bending over, which radiated to both sides of her ribs. This pain subsided after a few minutes and was not associated with nausea. She had a similar episode of  "sternum pain while doing laundry on Monday, which was accompanied by facial flushing and mild nausea. She notes that these symptoms have not been previously experienced. She reviewed her recent CT calcium score scan, which indicated clear lungs.    Supplemental Information  She completed a course of doxycycline for an eye infection a few weeks ago.    MEDICATIONS  Current: gabapentin, clonazepam, pantoprazole, levothyroxine  Past: Bactrim, Z-Rosendo, doxycycline    Objective   Vital Signs:  /76 (BP Location: Left arm, Patient Position: Sitting, Cuff Size: Adult)   Pulse 83   Ht 165.1 cm (65\")   Wt 58.7 kg (129 lb 6 oz)   SpO2 97%   BMI 21.53 kg/m²   Estimated body mass index is 21.53 kg/m² as calculated from the following:    Height as of this encounter: 165.1 cm (65\").    Weight as of this encounter: 58.7 kg (129 lb 6 oz).    BMI is within normal parameters. No other follow-up for BMI required.      The following portions of the patient's history were reviewed and updated as appropriate: allergies, current medications, past family history, past medical history, past social history, past surgical history, and problem list.       Physical Exam  Vitals reviewed.   Constitutional:       General: She is not in acute distress.     Appearance: She is not ill-appearing.   Cardiovascular:      Rate and Rhythm: Normal rate and regular rhythm.   Pulmonary:      Effort: Pulmonary effort is normal.      Breath sounds: Normal breath sounds.   Abdominal:      General: Bowel sounds are normal. There is no distension.      Palpations: Abdomen is soft.      Tenderness: There is abdominal tenderness in the epigastric area. There is no guarding or rebound.   Neurological:      Mental Status: She is alert.   Psychiatric:         Mood and Affect: Mood normal.        Result Review :  The following data was reviewed by: Payton Carey MD on 12/18/2024:            MRI Abdomen With & Without Contrast (11/25/2024 20:06)   CT " Cardiac Calcium Score Without Dye (11/13/2024 08:04)     Assessment and Plan   Diagnoses and all orders for this visit:    1. Burning mouth syndrome (Primary)  -     clonazePAM (KlonoPIN) 0.5 MG tablet; Take 1 tablet by mouth Every Night.  Dispense: 30 tablet; Refill: 2    2. RSD (reflex sympathetic dystrophy)    3. Chronic cough  -     XR Chest PA & Lateral; Future    4. Epigastric pain  -     pantoprazole (Protonix) 40 MG EC tablet; Take 1 tablet by mouth 2 (Two) Times a Day.  Dispense: 180 tablet; Refill: 3    5. Gastroesophageal reflux disease, unspecified whether esophagitis present  -     pantoprazole (Protonix) 40 MG EC tablet; Take 1 tablet by mouth 2 (Two) Times a Day.  Dispense: 180 tablet; Refill: 3    6. Laryngopharyngeal reflux (LPR)  -     pantoprazole (Protonix) 40 MG EC tablet; Take 1 tablet by mouth 2 (Two) Times a Day.  Dispense: 180 tablet; Refill: 3             Assessment & Plan  1. Chronic pain.  She is currently managing her chronic pain with gabapentin 100 mg, taken as needed.  The treatment plan includes a controlled substance.  Controlled Substance Medication Agreement signed and on file. Reviewed UDS.  Risks, benefits, and alternative treatments reviewed.  NICKY report 12/18/24 has been reviewed.      2. Burning mouth syndrome.  She continues to use clonazepam regularly to manage her oral symptoms. A refill for clonazepam will be provided.  [email or web address]      3. Chronic cough.  She reports a chronic cough and hoarseness, which worsened after a recent respiratory illness and two rounds of antibiotics. The cough is mostly nonproductive. A chest x-ray will be ordered to further evaluate the cough and rule out conditions such as walking pneumonia.     4. Epigastric pain.  She experienced epigastric pain radiating to both sides of  her ribs, which subsided after a few minutes. This pain could be related to acid indigestion. She is currently taking pantoprazole 40 mg in the morning. A new prescription for pantoprazole 40 mg twice daily will be provided to manage her symptoms.    Follow-up  The patient will follow up in 3 months for her next evaluation or sooner if any issues arise.      Follow Up   Return in about 3 months (around 3/18/2025) for Office visit chronic pain.  Patient was given instructions and counseling regarding her condition or for health maintenance advice. Please see specific information pulled into the AVS if appropriate.         Patient or patient representative verbalized consent for the use of Ambient Listening during the visit with  Payton Carey MD for chart documentation. 12/18/2024  12:37 EST    Electronically signed by Payton Carey MD, 12/18/24, 12:38 PM EST.

## 2025-04-08 ENCOUNTER — OFFICE VISIT (OUTPATIENT)
Age: 65
End: 2025-04-08
Payer: COMMERCIAL

## 2025-04-08 VITALS
BODY MASS INDEX: 22.16 KG/M2 | HEART RATE: 104 BPM | WEIGHT: 133 LBS | DIASTOLIC BLOOD PRESSURE: 78 MMHG | SYSTOLIC BLOOD PRESSURE: 128 MMHG | HEIGHT: 65 IN | OXYGEN SATURATION: 98 %

## 2025-04-08 DIAGNOSIS — G89.29 CHRONIC PAIN OF RIGHT KNEE: ICD-10-CM

## 2025-04-08 DIAGNOSIS — K14.6 BURNING MOUTH SYNDROME: Primary | ICD-10-CM

## 2025-04-08 DIAGNOSIS — G90.50 RSD (REFLEX SYMPATHETIC DYSTROPHY): ICD-10-CM

## 2025-04-08 DIAGNOSIS — M25.561 CHRONIC PAIN OF RIGHT KNEE: ICD-10-CM

## 2025-04-08 RX ORDER — DULOXETIN HYDROCHLORIDE 60 MG/1
60 CAPSULE, DELAYED RELEASE ORAL DAILY
Qty: 90 CAPSULE | Refills: 3 | Status: SHIPPED | OUTPATIENT
Start: 2025-04-08

## 2025-04-08 RX ORDER — ESTRADIOL 0.1 MG/G
2 CREAM VAGINAL
COMMUNITY
Start: 2024-12-19

## 2025-04-08 RX ORDER — OXYCODONE HYDROCHLORIDE 5 MG/1
5 CAPSULE ORAL DAILY PRN
Qty: 30 CAPSULE | Refills: 0 | Status: SHIPPED | OUTPATIENT
Start: 2025-04-08

## 2025-04-08 RX ORDER — GABAPENTIN 300 MG/1
300 CAPSULE ORAL 3 TIMES DAILY
Qty: 270 CAPSULE | Refills: 0 | Status: SHIPPED | OUTPATIENT
Start: 2025-04-08

## 2025-04-08 RX ORDER — GABAPENTIN 300 MG/1
1 CAPSULE ORAL 3 TIMES DAILY
COMMUNITY
Start: 2025-03-19 | End: 2025-04-08 | Stop reason: SDUPTHER

## 2025-04-08 RX ORDER — OXYCODONE HYDROCHLORIDE 5 MG/1
5 TABLET ORAL
COMMUNITY
Start: 2025-03-05 | End: 2025-04-08

## 2025-04-08 RX ORDER — GABAPENTIN 100 MG/1
100 CAPSULE ORAL NIGHTLY
Qty: 30 CAPSULE | Refills: 2 | Status: CANCELLED | OUTPATIENT
Start: 2025-04-08

## 2025-04-08 RX ORDER — CLONAZEPAM 0.5 MG/1
0.5 TABLET ORAL NIGHTLY
Qty: 90 TABLET | Refills: 0 | Status: SHIPPED | OUTPATIENT
Start: 2025-04-08

## 2025-04-08 RX ORDER — TROSPIUM CHLORIDE ER 60 MG/1
60 CAPSULE ORAL EVERY MORNING
COMMUNITY

## 2025-04-08 NOTE — PROGRESS NOTES
Chief Complaint  Pain    Subjective        Lisbeth Perdue presents to [facility]  History of Present Illness    History of Present Illness  The patient is a 64-year-old female presenting for follow-up of chronic pain, reflux sympathetic dystrophy, and burning mouth syndrome.    She experienced a fall in [place] on 01/25/2025, resulting in a fracture of her right patella. Initial treatment involved immobilization with a brace for two weeks, followed by surgical intervention on 02/12/2025 due to inadequate healing. Post-surgery, she was advised to engage in physical therapy twice weekly. Despite achieving a 90-degree flexion, her progress was deemed unsatisfactory, prompting a decision for further surgery if a 120-degree flexion is not achieved within six weeks. She has since transferred her care to [place] and will be under the supervision of THANG Martinez, with whom she has an established relationship from previous falls. She initiated physical therapy yesterday and reports a flare-up of her Complex Regional Pain Syndrome (CRPS) in the affected knee. She was provided with a sleeve for swelling management and advised to wear compression socks, which she finds intolerable. Her current medication regimen includes Advil and Tylenol every eight hours, and gabapentin 300 mg three times daily, which she requested post-surgery due to her CRPS. She is considering a consultation with a pain management specialist and is contemplating increasing her duloxetine dosage from 30 mg to 60 mg, a strategy that has previously proven effective. She was prescribed oxycodone during her hospital stay but did not fill the prescription as she did not require it. However, she has been taking one oxycodone tablet an hour before physical therapy sessions, which she finds beneficial. She is aware of the potential risks associated with concurrent use of gabapentin and oxycodone and ensures a minimum  "six-hour gap between doses. She also takes clonazepam at night. She recalls that during her previous episode of Reflex Sympathetic Dystrophy (RSD), she was not on any narcotics or Advil, but was managing her pain with high doses of gabapentin and duloxetine. She is currently out of refills for gabapentin and clonazepam. She has approximately 14 oxycodone tablets left and is scheduled for physical therapy three times a week.    MEDICATIONS  Current: Advil, Tylenol, gabapentin 300 mg, duloxetine 30 mg, oxycodone 5 mg, clonazepam    Objective   Vital Signs:  /78 (BP Location: Left arm, Patient Position: Sitting, Cuff Size: Adult)   Pulse 104   Ht 165.1 cm (65\")   Wt 60.3 kg (133 lb)   SpO2 98%   BMI 22.13 kg/m²   Estimated body mass index is 22.13 kg/m² as calculated from the following:    Height as of this encounter: 165.1 cm (65\").    Weight as of this encounter: 60.3 kg (133 lb).    BMI is within normal parameters. No other follow-up for BMI required.      The following portions of the patient's history were reviewed and updated as appropriate: allergies, current medications, past family history, past medical history, past social history, past surgical history, and problem list.       Physical Exam  Vitals reviewed.   Constitutional:       General: She is not in acute distress.     Appearance: She is not ill-appearing.   Cardiovascular:      Rate and Rhythm: Normal rate and regular rhythm.   Pulmonary:      Effort: Pulmonary effort is normal.      Breath sounds: Normal breath sounds.   Musculoskeletal:      Right knee: Swelling and effusion present. No erythema or ecchymosis.   Neurological:      Mental Status: She is alert.   Psychiatric:         Mood and Affect: Mood normal.        Result Review :                Assessment and Plan   Diagnoses and all orders for this visit:    1. Burning mouth syndrome (Primary)  -     clonazePAM (KlonoPIN) 0.5 MG tablet; Take 1 tablet by mouth Every Night.  Dispense: " 90 tablet; Refill: 0  -     Ambulatory Referral to Pain Management Clinic  -     DULoxetine (CYMBALTA) 60 MG capsule; Take 1 capsule by mouth Daily.  Dispense: 90 capsule; Refill: 3  -     naloxone (NARCAN) 4 MG/0.1ML nasal spray; Call 911. Don't prime. [place] in 1 nostril for overdose. Repeat in 2-3 minutes in other nostril if no or minimal breathing/responsiveness.  Dispense: 2 each; Refill: 2  -     gabapentin (NEURONTIN) 300 MG capsule; Take 1 capsule by mouth 3 times a day.  Dispense: 270 capsule; Refill: 0    2. RSD (reflex sympathetic dystrophy)  -     Ambulatory Referral to Pain Management Clinic  -     DULoxetine (CYMBALTA) 60 MG capsule; Take 1 capsule by mouth Daily.  Dispense: 90 capsule; Refill: 3  -     naloxone (NARCAN) 4 MG/0.1ML nasal spray; Call 911. Don't prime. [place] in 1 nostril for overdose. Repeat in 2-3 minutes in other nostril if no or minimal breathing/responsiveness.  Dispense: 2 each; Refill: 2  -     gabapentin (NEURONTIN) 300 MG capsule; Take 1 capsule by mouth 3 times a day.  Dispense: 270 capsule; Refill: 0    3. Chronic pain of right knee  -     oxyCODONE (OXY-IR) 5 MG capsule; Take 1 capsule by mouth Daily As Needed for Moderate Pain (physical therapy).  Dispense: 30 capsule; Refill: 0           The treatment plan includes a controlled substance.  Controlled Substance Medication Agreement signed and on file. Reviewed UDS.  Risks, benefits, and alternative treatments reviewed.  [facility] report 04/08/25 has been reviewed.    Assessment & Plan  1. Chronic Pain.  She reports chronic pain exacerbated by a recent fall and subsequent surgery on her right kneecap. She is currently taking Advil and Tylenol every 8 hours, gabapentin 300 mg three times a day, and oxycodone 5 mg before physical therapy sessions. A prescription for Narcan nasal spray will be provided for safety due to the high-risk medications she is on. A referral to a pain management specialist at the  clinic will be made to  help manage her medications and pain.    2. Complex Regional Pain Syndrome (CRPS).  She reports a flare-up of CRPS in her right knee following surgery. She is using a compression sleeve and compression socks to manage swelling. She will continue with physical therapy and follow up with her orthopedic specialist, THANG Martinez, for further management. She will increase duloxetine to 60 mg daily to help with pain    3.  Burning mouth  She will continue taking clonazepam at night.      Follow-up  The patient will follow up in 3 months.    PROCEDURE  The patient underwent surgical intervention for a fractured right patella on 02/12/2025.      Follow Up   Return in about 3 months (around 7/8/2025) for Office visit chronic pain.  Patient was given instructions and counseling regarding her condition or for health maintenance advice. Please see specific information pulled into the AVS if appropriate.         Patient or patient representative verbalized consent for the use of Ambient Listening during the visit with  Payton Carey MD for chart documentation. 4/8/2025  14:07 EDT    Electronically signed by Payton Carey MD, 04/08/25, 2:08 PM EDT.

## 2025-04-17 ENCOUNTER — TELEPHONE (OUTPATIENT)
Dept: NEUROLOGY | Facility: CLINIC | Age: 65
End: 2025-04-17
Payer: COMMERCIAL

## 2025-04-17 NOTE — TELEPHONE ENCOUNTER
Caller: Lisbeth Perdue    Relationship: Self    Best call back number: [phone]    What was the call regarding: PATIENT IS REQUESTING THAT THEIR UPCOMING VISIT ON 04/22 BE A TELEHEALTH VISIT FOR CONVINCE     Is it okay if the provider responds through MyChart: YES    PLEASE REVIEW AND ADVISE

## 2025-04-17 NOTE — TELEPHONE ENCOUNTER
"    Relay     \"Called and left vm that patient must be seen in office due to last visit being in November 2023.  \"              Called and left vm that patient must be seen in office due to last visit being in November 2023.    "

## 2025-07-08 ENCOUNTER — LAB (OUTPATIENT)
Age: 65
End: 2025-07-08
Payer: MEDICARE

## 2025-07-08 ENCOUNTER — OFFICE VISIT (OUTPATIENT)
Age: 65
End: 2025-07-08
Payer: MEDICARE

## 2025-07-08 VITALS
HEART RATE: 86 BPM | HEIGHT: 66 IN | SYSTOLIC BLOOD PRESSURE: 126 MMHG | BODY MASS INDEX: 21.13 KG/M2 | OXYGEN SATURATION: 96 % | DIASTOLIC BLOOD PRESSURE: 76 MMHG | WEIGHT: 131.5 LBS

## 2025-07-08 DIAGNOSIS — E03.9 ACQUIRED HYPOTHYROIDISM: ICD-10-CM

## 2025-07-08 DIAGNOSIS — Z79.899 CONTROLLED SUBSTANCE AGREEMENT SIGNED: ICD-10-CM

## 2025-07-08 DIAGNOSIS — K14.6 BURNING MOUTH SYNDROME: Primary | Chronic | ICD-10-CM

## 2025-07-08 DIAGNOSIS — G90.50 RSD (REFLEX SYMPATHETIC DYSTROPHY): Chronic | ICD-10-CM

## 2025-07-08 DIAGNOSIS — Z51.81 THERAPEUTIC DRUG MONITORING: ICD-10-CM

## 2025-07-08 PROCEDURE — 1159F MED LIST DOCD IN RCRD: CPT | Performed by: FAMILY MEDICINE

## 2025-07-08 PROCEDURE — 99214 OFFICE O/P EST MOD 30 MIN: CPT | Performed by: FAMILY MEDICINE

## 2025-07-08 PROCEDURE — 36415 COLL VENOUS BLD VENIPUNCTURE: CPT | Performed by: FAMILY MEDICINE

## 2025-07-08 PROCEDURE — 1126F AMNT PAIN NOTED NONE PRSNT: CPT | Performed by: FAMILY MEDICINE

## 2025-07-08 PROCEDURE — G2211 COMPLEX E/M VISIT ADD ON: HCPCS | Performed by: FAMILY MEDICINE

## 2025-07-08 PROCEDURE — 1160F RVW MEDS BY RX/DR IN RCRD: CPT | Performed by: FAMILY MEDICINE

## 2025-07-08 RX ORDER — GABAPENTIN 300 MG/1
300 CAPSULE ORAL 3 TIMES DAILY
Qty: 270 CAPSULE | Refills: 0 | Status: SHIPPED | OUTPATIENT
Start: 2025-07-08

## 2025-07-08 RX ORDER — CLONAZEPAM 0.5 MG/1
0.5 TABLET ORAL NIGHTLY
Qty: 90 TABLET | Refills: 0 | Status: SHIPPED | OUTPATIENT
Start: 2025-07-08

## 2025-07-08 NOTE — PROGRESS NOTES
Chief Complaint  Pain    Subjective        Lisbeth Perdue presents to [facility]  History of Present Illness    History of Present Illness  The patient is a 64-year-old female presenting for follow-up of chronic pain, reflux, sympathetic dystrophy, and burning mouth syndrome.    She reports persistent hypersensitivity in her right knee following surgery, to the extent that she cannot tolerate blankets or long pants. She has not yet consulted a pain management specialist but plans to seek a second opinion from an orthopedic surgeon regarding her knee on Thursday due to her inability to fully extend it and difficulty descending stairs. Her physical therapist suspects a soft tissue or cartilage issue. She is uncertain if her pain is related to RSD. She is currently taking gabapentin 300 mg three times daily and duloxetine 60 mg, an increase from her previous dose of 30 mg. She has declined to restart oxycodone for pain management.  She filled the prescription but has not taken a dose.  She also has Narcan available at her home.    She is also struggling with hypothyroidism, experiencing constant coldness, extreme fatigue, and hair thinning. She is having difficulty managing her medication schedule due to potential interactions between her medications. She is currently taking trospium chloride and pantoprazole twice daily, and levothyroxine on an empty stomach an hour before food. She believes her symptoms are due to improper medication timing.    Her burning mouth syndrome is well-managed with clonazepam 0.5 mg at night.    She is seeking refills for her estradiol patch and trospium chloride, which were prescribed by Dr. Fall in 12/2024. She reports no side effects from the trospium chloride and feels she is tolerating it better than oxybutynin. She has five refills remaining. She reports no menopausal symptoms and was advised to continue the estradiol patch indefinitely by  "her current OB/GYN.        Objective   Vital Signs:  /76 (BP Location: Left arm, Patient Position: Sitting, Cuff Size: Adult)   Pulse 86   Ht 166.5 cm (65.55\")   Wt 59.6 kg (131 lb 8 oz)   SpO2 96%   BMI 21.52 kg/m²   Estimated body mass index is 21.52 kg/m² as calculated from the following:    Height as of this encounter: 166.5 cm (65.55\").    Weight as of this encounter: 59.6 kg (131 lb 8 oz).    BMI is within normal parameters. No other follow-up for BMI required.      The following portions of the patient's history were reviewed and updated as appropriate: allergies, current medications, past family history, past medical history, past social history, past surgical history, and problem list.       Physical Exam  Vitals reviewed.   Constitutional:       General: She is not in acute distress.     Appearance: She is not ill-appearing.   Cardiovascular:      Rate and Rhythm: Normal rate and regular rhythm.   Pulmonary:      Effort: Pulmonary effort is normal.      Breath sounds: Normal breath sounds.   Musculoskeletal:      Right knee: Swelling present. No erythema or ecchymosis. Decreased range of motion.   Neurological:      Mental Status: She is alert.        Result Review :           Urogynecologist office note 12/19/25     Assessment and Plan   Diagnoses and all orders for this visit:    1. Burning mouth syndrome (Primary)  -     clonazePAM (KlonoPIN) 0.5 MG tablet; Take 1 tablet by mouth Every Night.  Dispense: 90 tablet; Refill: 0  -     gabapentin (NEURONTIN) 300 MG capsule; Take 1 capsule by mouth 3 times a day.  Dispense: 270 capsule; Refill: 0    2. RSD (reflex sympathetic dystrophy)  -     gabapentin (NEURONTIN) 300 MG capsule; Take 1 capsule by mouth 3 times a day.  Dispense: 270 capsule; Refill: 0    3. Controlled substance agreement signed    4. Therapeutic drug monitoring  -     Cancel: Drug Analysis,Comp,Oral Fluid - Saliva,; Future  -     ToxAssure Flex 23, Ur -; Future    5. Acquired " hypothyroidism  -     TSH; Future  -     T4, Free; Future             Assessment & Plan  1. Reflex Sympathetic Dystrophy (RSD).  - Reports extreme hypersensitivity in the right knee post-surgery, possibly related with RSD.   - Currently on gabapentin 300 mg three times a day and duloxetine 60 mg daily.  - She prefers to maintain the current gabapentin dosage and not increase it at this time.  - Consult with an orthopedic surgeon scheduled for Thursday for a second opinion on her knee.    2. Hypothyroidism.  - Reports symptoms of freezing, extreme fatigue, and hair thinning. Struggling with medication timing due to interactions with other medications.  - TSH and T4 labs will be checked today to assess thyroid function.      3. Burning Mouth Syndrome.  - Condition currently managed with clonazepam 0.5 mg at night.  - Will continue this regimen.  - Refills for clonazepam and gabapentin will be sent to the pharmacy.    4. Medication Management.  - Requested prescriptions for estradiol patch and trospium chloride. Records from Dr. Fall at  will be reviewed to continue these medications.  - Advised to continue with Dr. Cantu and the nurse practitioner for her estradiol patch as it increases the risk of heart attacks, strokes, and blood clots in women above age 60.  Discussed that I do not recommend systemic estrogen therapy after age 60.    Follow-up  - Follow up in 3 months for a Medicare wellness visit.      Follow Up   Return in about 3 months (around 10/8/2025) for MWV and chronic pain.  Patient was given instructions and counseling regarding her condition or for health maintenance advice. Please see specific information pulled into the AVS if appropriate.         Patient or patient representative verbalized consent for the use of Ambient Listening during the visit with  Payton Carey MD for chart documentation. 7/8/2025  14:41 EDT    Electronically signed by Payton Carey MD, 07/08/25, 2:41 PM EDT.

## 2025-07-11 ENCOUNTER — PATIENT MESSAGE (OUTPATIENT)
Age: 65
End: 2025-07-11
Payer: MEDICARE

## 2025-07-11 DIAGNOSIS — E03.9 ACQUIRED HYPOTHYROIDISM: Primary | ICD-10-CM

## 2025-07-13 LAB
1OH-MIDAZOLAM UR QL SCN: NOT DETECTED NG/MG CREAT
6MAM UR QL SCN: NEGATIVE NG/ML
7AMINOCLONAZEPAM/CREAT UR: 117 NG/MG CREAT
A-OH ALPRAZ/CREAT UR: NOT DETECTED NG/MG CREAT
A-OH-TRIAZOLAM/CREAT UR CFM: NOT DETECTED NG/MG CREAT
ACP UR QL CFM: NOT DETECTED
ALPRAZ/CREAT UR CFM: NOT DETECTED NG/MG CREAT
AMPHETAMINES UR QL SCN: NEGATIVE NG/ML
APAP UR QL SCN: NEGATIVE UG/ML
BARBITURATES UR QL SCN: NEGATIVE NG/ML
BENZODIAZ SCN METH UR: NORMAL
BUPRENORPHINE UR QL SCN: NEGATIVE
BUPRENORPHINE/CREAT UR: NOT DETECTED NG/MG CREAT
CANNABINOIDS UR QL SCN: NEGATIVE NG/ML
CARISOPRODOL UR QL: NEGATIVE NG/ML
CLONAZEPAM/CREAT UR CFM: NOT DETECTED NG/MG CREAT
COCAINE+BZE UR QL SCN: NEGATIVE NG/ML
CREAT UR-MCNC: 35 MG/DL
D-METHORPHAN UR-MCNC: NOT DETECTED NG/ML
D-METHORPHAN+LEVORPHANOL UR QL: NOT DETECTED
DESALKYLFLURAZ/CREAT UR: NOT DETECTED NG/MG CREAT
DIAZEPAM/CREAT UR: NOT DETECTED NG/MG CREAT
ETHANOL UR QL SCN: NEGATIVE G/DL
ETHANOL UR QL SCN: NEGATIVE NG/ML
FENTANYL CTO UR SCN-MCNC: NEGATIVE NG/ML
FENTANYL/CREAT UR: NOT DETECTED NG/MG CREAT
FLUNITRAZEPAM UR QL SCN: NOT DETECTED NG/MG CREAT
GABAPENTIN UR CFM-MCNC: PRESENT NG/ML
GABAPENTIN UR QL CFM: NORMAL
GABAPENTIN UR-MCNC: NORMAL UG/ML
HALLUCINOGENS UR: NEGATIVE
HYPNOTICS UR QL SCN: NEGATIVE
KETAMINE UR QL: NOT DETECTED
LORAZEPAM/CREAT UR: NOT DETECTED NG/MG CREAT
MEPERIDINE UR QL SCN: NEGATIVE NG/ML
METHADONE UR QL SCN: NEGATIVE NG/ML
METHADONE+METAB UR QL SCN: NEGATIVE NG/ML
MIDAZOLAM/CREAT UR CFM: NOT DETECTED NG/MG CREAT
MISCELLANEOUS, UR: NEGATIVE
NORBUPRENORPHINE/CREAT UR: NOT DETECTED NG/MG CREAT
NORDIAZEPAM/CREAT UR: NOT DETECTED NG/MG CREAT
NORFENTANYL/CREAT UR: NOT DETECTED NG/MG CREAT
NORFLUNITRAZEPAM UR-MCNC: NOT DETECTED NG/MG CREAT
NORKETAMINE UR-MCNC: NOT DETECTED UG/ML
OPIATES UR SCN-MCNC: NEGATIVE NG/ML
OXAZEPAM/CREAT UR: NOT DETECTED NG/MG CREAT
OXYCODONE CTO UR SCN-MCNC: NEGATIVE NG/ML
PCP UR QL SCN: NEGATIVE NG/ML
PRESCRIBED MEDICATIONS: NORMAL
PROPOXYPH UR QL SCN: NEGATIVE NG/ML
TAPENTADOL CTO UR SCN-MCNC: NEGATIVE NG/ML
TEMAZEPAM/CREAT UR: NOT DETECTED NG/MG CREAT
TRAMADOL UR QL SCN: NEGATIVE NG/ML
ZALEPLON UR-MCNC: NOT DETECTED NG/ML
ZOLPIDEM PHENYL-4-CARB UR QL SCN: NOT DETECTED
ZOLPIDEM UR QL SCN: NOT DETECTED
ZOPICLONE-N-OXIDE UR-MCNC: NOT DETECTED NG/ML

## 2025-07-15 ENCOUNTER — LAB (OUTPATIENT)
Dept: LAB | Facility: HOSPITAL | Age: 65
End: 2025-07-15
Payer: MEDICARE

## 2025-07-15 DIAGNOSIS — E03.9 ACQUIRED HYPOTHYROIDISM: ICD-10-CM

## 2025-07-15 LAB
T4 FREE SERPL-MCNC: 1.35 NG/DL (ref 0.92–1.68)
TSH SERPL DL<=0.05 MIU/L-ACNC: 1.91 UIU/ML (ref 0.27–4.2)

## 2025-07-15 PROCEDURE — 36415 COLL VENOUS BLD VENIPUNCTURE: CPT

## 2025-07-15 PROCEDURE — 84443 ASSAY THYROID STIM HORMONE: CPT

## 2025-07-15 PROCEDURE — 84439 ASSAY OF FREE THYROXINE: CPT

## 2025-07-31 ENCOUNTER — PATIENT MESSAGE (OUTPATIENT)
Age: 65
End: 2025-07-31
Payer: MEDICARE

## 2025-07-31 RX ORDER — MIRABEGRON 25 MG/1
25 TABLET, FILM COATED, EXTENDED RELEASE ORAL DAILY
Qty: 30 TABLET | Refills: 11 | Status: SHIPPED | OUTPATIENT
Start: 2025-07-31

## 2025-08-11 ENCOUNTER — TELEPHONE (OUTPATIENT)
Age: 65
End: 2025-08-11
Payer: MEDICARE

## 2025-08-15 DIAGNOSIS — E03.9 ACQUIRED HYPOTHYROIDISM: ICD-10-CM

## 2025-08-15 RX ORDER — LEVOTHYROXINE SODIUM 25 UG/1
TABLET ORAL
Qty: 30 TABLET | Refills: 11 | Status: SHIPPED | OUTPATIENT
Start: 2025-08-15

## 2025-08-22 DIAGNOSIS — M62.89 PELVIC FLOOR DYSFUNCTION IN FEMALE: Primary | ICD-10-CM

## 2025-08-22 RX ORDER — ESTRADIOL 0.04 MG/D
1 PATCH, EXTENDED RELEASE TRANSDERMAL 2 TIMES WEEKLY
Qty: 8 PATCH | Refills: 11 | Status: SHIPPED | OUTPATIENT
Start: 2025-08-25 | End: 2026-08-25